# Patient Record
Sex: MALE | Race: WHITE | NOT HISPANIC OR LATINO | Employment: OTHER | URBAN - METROPOLITAN AREA
[De-identification: names, ages, dates, MRNs, and addresses within clinical notes are randomized per-mention and may not be internally consistent; named-entity substitution may affect disease eponyms.]

---

## 2020-03-15 ENCOUNTER — HOSPITAL ENCOUNTER (EMERGENCY)
Facility: HOSPITAL | Age: 70
Discharge: HOME | End: 2020-03-15
Attending: EMERGENCY MEDICINE
Payer: MEDICARE

## 2020-03-15 VITALS
TEMPERATURE: 98 F | RESPIRATION RATE: 14 BRPM | SYSTOLIC BLOOD PRESSURE: 161 MMHG | WEIGHT: 112 LBS | HEIGHT: 66 IN | HEART RATE: 88 BPM | OXYGEN SATURATION: 100 % | BODY MASS INDEX: 18 KG/M2 | DIASTOLIC BLOOD PRESSURE: 70 MMHG
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
## 2020-03-15 DIAGNOSIS — S00.83XA FACIAL CONTUSION, INITIAL ENCOUNTER: ICD-10-CM

## 2020-03-15 DIAGNOSIS — M79.642 LEFT HAND PAIN: Primary | ICD-10-CM

## 2020-03-15 DIAGNOSIS — M79.642 HAND PAIN, LEFT: ICD-10-CM

## 2020-03-15 PROCEDURE — 99285 PR EMERGENCY DEPT VISIT,LEVEL V: ICD-10-PCS | Mod: ,,, | Performed by: EMERGENCY MEDICINE

## 2020-03-15 PROCEDURE — 99285 EMERGENCY DEPT VISIT HI MDM: CPT | Mod: ,,, | Performed by: EMERGENCY MEDICINE

## 2020-03-15 PROCEDURE — 99285 EMERGENCY DEPT VISIT HI MDM: CPT | Mod: 25

## 2020-03-15 RX ORDER — PHENOBARBITAL 16.2 MG/1
32 TABLET ORAL DAILY
COMMUNITY

## 2020-03-15 RX ORDER — TIZANIDINE 4 MG/1
4 TABLET ORAL 2 TIMES DAILY
Status: ON HOLD | COMMUNITY
End: 2021-10-25 | Stop reason: HOSPADM

## 2020-03-15 RX ORDER — ZONISAMIDE 100 MG/1
CAPSULE ORAL
COMMUNITY

## 2020-03-15 RX ORDER — GABAPENTIN 100 MG/1
100 CAPSULE ORAL 2 TIMES DAILY
COMMUNITY

## 2020-03-15 RX ORDER — ACETAMINOPHEN 500 MG
500 TABLET ORAL EVERY 6 HOURS PRN
Qty: 30 TABLET | Refills: 0 | Status: SHIPPED | OUTPATIENT
Start: 2020-03-15

## 2020-03-15 NOTE — ED PROVIDER NOTES
Encounter Date: 3/15/2020    SCRIBE #1 NOTE: I, Ran Hernandez, am scribing for, and in the presence of,  Dr. Parson. I have scribed the entire note.       History     Chief Complaint   Patient presents with    Hand Injury     Pt states that he had a trip and fall on Friday, landing on his left hand.  Pt with pain and decreased ROM to hand.     Time patient was seen by the provider: 2:55 PM      The patient is a 69 y.o. male with co-morbidities including: Epilepsy, stroke, and Arthritis, who presents to the ED with a complaint of Hand injury. The patient states that he fell and caught himself with his left hand at about 3:30 am on Friday. He reports hitting a desk while trying to break his fall before landing on the wooden floor. He reports the pain is centralized in his whole left hand. Patient is right hand dominant. He states the pain has been severe and sharp in nature, prompting him to take his aspirin which did not alleviate his symptoms. He confirms that he caught the left side of his jaw on the desk on the way down but endorses minor pain. NKDA. He has no other acute symptoms to report at this time.      The history is provided by the patient.     Review of patient's allergies indicates:  No Known Allergies  Past Medical History:   Diagnosis Date    Arthritis     Epilepsy     Hyperglycemia     Stroke      Past Surgical History:   Procedure Laterality Date    CHOLECYSTECTOMY      JOINT REPLACEMENT      SKIN CANCER EXCISION       Family History   Problem Relation Age of Onset    Diabetes Mother      Social History     Tobacco Use    Smoking status: Current Every Day Smoker     Packs/day: 1.00     Years: 45.00     Pack years: 45.00     Types: Cigarettes    Smokeless tobacco: Never Used   Substance Use Topics    Alcohol use: No    Drug use: No     Review of Systems   CONST: No fever, chills, weight change, or fatigue.  HEENT: No headache, blurry vision/change in vision, sore throat, ear pain, eye pain,  otorrhea, rhinorrhea, tooth pain, swelling, or voice changes.  NECK: No pain, masses, trauma, or redness.  HEART: No pain, palpitations, or diaphoresis.  LUNG: No SOB, cough, orthopnea, GOMEZ or other complaints.  ABDOMEN: No pain, nausea, vomiting, diarrhea, constipation, or flank pain.  : No discharge, dysuria, lesions, rashes, masses, sores.  EXTREMITIES: FROM with No swelling, redness, injuries/trauma, lesions, sores, weakness, numbness, or tingling. Left hand pain secondary to fall.  NEURO: No dizziness, weakness, fatigue, tremors, headache, change in vision or disturbances of balance or coordination.  SKIN: No lesions, rashes, trauma or other complaints.      Physical Exam     Initial Vitals [03/15/20 1415]   BP Pulse Resp Temp SpO2   (!) 189/74 (!) 112 16 98.8 °F (37.1 °C) 98 %      MAP       --         Physical Exam    Nursing note and vitals reviewed.  Constitutional: He appears well-developed and well-nourished. He is not diaphoretic. No distress.   HENT:   Head: Normocephalic and atraumatic.   Eyes: EOM are normal. Pupils are equal, round, and reactive to light. No scleral icterus.   Neck: Normal range of motion. Neck supple.   Cardiovascular: Normal rate, regular rhythm, normal heart sounds and intact distal pulses. Exam reveals no gallop and no friction rub.    No murmur heard.  Pulmonary/Chest: Breath sounds normal.   Musculoskeletal:   Decreased ROM. TTP of the MCP region of the ring and little finger. Area of ecchymosis over base metacarpal and MCP of the 4th finger.    Neurological: He is alert and oriented to person, place, and time.   Skin: Skin is warm and dry. Capillary refill takes less than 2 seconds. No erythema. No pallor.   Psychiatric: He has a normal mood and affect.         ED Course   Procedures  Labs Reviewed - No data to display       Imaging Results          X-Ray Wrist Navicular Views Left (Final result)  Result time 03/15/20 17:19:37    Final result by Prabhu Garza MD (03/15/20  17:19:37)                 Impression:      Mild degenerative changes with no acute radiographic abnormality.      Electronically signed by: Prabhu Boyer  Date:    03/15/2020  Time:    17:19             Narrative:    EXAMINATION:  XR WRIST NAVICULAR VIEWS LEFT    CLINICAL HISTORY:  wrist pain;    TECHNIQUE:  Four views of the left wrist/navicular.  AP and oblique views.    COMPARISON:  03/15/2020 approximately 1-1/2 hours prior    FINDINGS:  No significant change in the short interval.    Mild degenerative changes of the radiocarpal joint and 1st carpometacarpal joint.    No acute fracture, subluxation or dislocation.  No lateral views.    No osseous destruction.  Scaphoid appears intact.                               CT Head Without Contrast (Final result)  Result time 03/15/20 16:35:36    Final result by Alva Pina MD (03/15/20 16:35:36)                 Impression:      Significant white matter loss involving the right parietal lobe and occipital lobe consistent with a prior area of infarction with compensatory enlargement of the right lateral ventricle.    No definite acute process seen.      Electronically signed by: Alva Pina MD  Date:    03/15/2020  Time:    16:35             Narrative:    EXAMINATION:  CT HEAD WITHOUT CONTRAST    CLINICAL HISTORY:  Maxface trauma blunt;    TECHNIQUE:  Low dose axial images were obtained through the head.  Coronal and sagittal reformations were also performed. Contrast was not administered.    COMPARISON:  08/25/2006    FINDINGS:  Significant white matter loss of the right occipital and parietal lobe with compensatory enlargement of the posterior horn and temporal horn of the right lateral ventricle likely the results of a prior infarction.  No definite area of hypoattenuation seen to strongly suggest an acute infarction.  There is no intracranial mass or hemorrhage seen.  No subdural fluid collection.  The skull appears intact.  The visualized paranasal  sinuses are clear.                               CT Maxillofacial Without Contrast (Final result)  Result time 03/15/20 17:01:42    Final result by Alva Pina MD (03/15/20 17:01:42)                 Impression:      No evidence of an acute facial fracture.    Mild displacement of the infraorbital wall that appears similar in configuration when compared to CT 08/25/2006 and consistent with a remote inferior orbital wall fracture.    Calcification of the bilateral carotid bulbs and ICAs.    Additional stable incidental findings, as above.    Electronically signed by resident: Pete Villanueva  Date:    03/15/2020  Time:    16:34    Electronically signed by: Alva Pina MD  Date:    03/15/2020  Time:    17:01             Narrative:    EXAMINATION:  CT MAXILLOFACIAL WITHOUT CONTRAST    CLINICAL HISTORY:  Maxface trauma blunt;TMJ pain or limited movement;    TECHNIQUE:  Low dose axial images, sagittal and coronal reformations were obtained through the face.  Contrast was not administered.    COMPARISON:  CT head 08/25/2006.    FINDINGS:  Facial bones: There is mild superior displacement of a portion of the right inferior orbital wall that appears relatively similar in configuration when compared to CT 08/25/2006, likely representing a remote orbital wall fracture.  Otherwise, there is no fracture involving the bones of the face.  Specifically the mandible is without fracture.  The bilateral temporomandibular joints are in appropriate position.    Subcutaneous soft tissues: No significant soft tissue thickening or edema.    Orbits: Within normal limits.    Paranasal sinuses: Minimal mucosal thickening in the posterior ethmoidal air cells.  Remaining paranasal sinuses are essentially clear.  Mild leftward septal deviation.  Mastoid air cells are clear.    Aerodigestive tract: Patient is edentulous.    Lymph nodes: No pathologic adenopathy.    Salivary glands: Normal.    Vascular structures: Calcification  involving the bilateral carotid bulbs, more prominent on the left.  Additional calcification involving the supraclinoid portions of the ICAs.    Skull base: Within normal limits.    Cervical spine (imaged portions): Moderate multilevel degenerative changes of the cervical spine.  No acute fracture or osseous destructive process.    Other findings: N/A                               CT Cervical Spine Without Contrast (Final result)  Result time 03/15/20 16:59:22    Final result by Alva Pina MD (03/15/20 16:59:22)                 Impression:      No acute fracture identified in the cervical spine.    Degenerative changes as above, most severe at the left C3-C4 region with severe left-sided neural foraminal narrowing.    Biapical centrilobular and paraseptal emphysema.    For further characterization of the head and maxillofacial region, please see dedicated studies of same date.    Electronically signed by resident: Dirk Pradhan  Date:    03/15/2020  Time:    16:31    Electronically signed by: Alva Pina MD  Date:    03/15/2020  Time:    16:59             Narrative:    EXAMINATION:  CT CERVICAL SPINE WITHOUT CONTRAST    CLINICAL HISTORY:  Neck pain, first study;    TECHNIQUE:  Low dose axial images, sagittal and coronal reformations were performed through the cervical spine.  Contrast was not administered.    COMPARISON:  CT head and maxillofacial of same date 03/15/2020    CT head 08/25/2006, 08/23/2006    MRI brain 08/22/2006    FINDINGS:  Skull base and craniocervical junction (partially imaged): No significant abnormality.    Spinal alignment: Normal cervical lordosis.  No spondylolisthesis.    Vertebrae: Anterior and posterior arches of C1 are normal. Ondontoid process is intact. Vertebral body heights are well maintained.  No evidence of fracture or dislocation.    Discs: Disc space height is well maintained.    Degenerative changes:    C2-C3:Left-sided facet arthropathy with mild neural  foraminal narrowing.  No significant spinal canal stenosis.    C3-C4:Severe left-sided facet arthropathy and uncovertebral spurring with resultant severe left-sided neural foraminal narrowing.  No significant spinal canal stenosis.    C4-C5:Mild right-sided facet arthropathy.  No significant neural foraminal narrowing or spinal canal stenosis.    C5-C6:Mild sided uncovertebral spurring with mild left-sided neural foraminal narrowing.  No significant spinal canal stenosis.    C6-C7:No significant neural foraminal narrowing or spinal canal stenosis.    C7-T1:No significant spinal stenosis or neural foraminal narrowing.    The soft tissue structures visualized in the neck are unremarkable.    The airway is patent and the lung apices demonstrate centrilobular and paraseptal emphysema, no focal opacities identified..  The visualized portions of the brain demonstrate no significant abnormality.                               X-Ray Hand 2 View Left (Final result)  Result time 03/15/20 15:37:17    Final result by Alva Pina MD (03/15/20 15:37:17)                 Impression:      As above      Electronically signed by: Alva Pina MD  Date:    03/15/2020  Time:    15:37             Narrative:    EXAMINATION:  XR HAND 2 VIEW LEFT    CLINICAL HISTORY:  Pain in left hand    TECHNIQUE:  AP, and lateral    COMPARISON:  None    FINDINGS:  The alignment is normal.  No definite fracture line identified.  Very subtle irregularity radial margin of the scaphoid not uncommonly seen without a definite fracture line identified, if symptoms referred to the scaphoid consider a scaphoid view.  No advanced degenerative change.  The soft tissues appear normal.                               X-Ray Wrist Complete Left (Final result)  Result time 03/15/20 15:37:08    Final result by Umu Oconnell MD (03/15/20 15:37:08)                 Impression:      As above.      Electronically signed by: Umu Oconnell  MD  Date:    03/15/2020  Time:    15:37             Narrative:    EXAMINATION:  XR WRIST COMPLETE 3 VIEWS LEFT    CLINICAL HISTORY:  Pain in left hand    TECHNIQUE:  PA, lateral, and oblique views of the left wrist were performed.    COMPARISON:  None    FINDINGS:  No acute fracture or bony destructive process is seen.  There are mild degenerative changes at the radiocarpal and 1st carpometacarpal articulations.  There is diffuse osseous demineralization.  Alignment is preserved.                               X-Ray Forearm Left (Final result)  Result time 03/15/20 15:31:30    Final result by Umu Oconnell MD (03/15/20 15:31:30)                 Impression:      As above.      Electronically signed by: Umu Oconnell MD  Date:    03/15/2020  Time:    15:31             Narrative:    EXAMINATION:  XR FOREARM LEFT    CLINICAL HISTORY:  Unspecified fall, initial encounter    TECHNIQUE:  AP and lateral views of the left forearm were performed.    COMPARISON:  None    FINDINGS:  No acute fracture or bony destructive process is seen.  Alignment of the forearm is preserved.  There are mild degenerative changes at the radiocarpal articulation and 1st carpometacarpal joint with joint space narrowing and some sclerosis of the opposing articular surfaces.  There is diffuse osseous demineralization.                               X-Ray Chest PA And Lateral (Final result)  Result time 03/15/20 15:28:39    Final result by Susan Root MD (03/15/20 15:28:39)                 Impression:      No acute process.      Electronically signed by: Susan Root MD  Date:    03/15/2020  Time:    15:28             Narrative:    EXAMINATION:  XR CHEST PA AND LATERAL    CLINICAL HISTORY:  Unspecified fall, initial encounter    TECHNIQUE:  PA and lateral views of the chest were performed.    COMPARISON:  11/03/2010    FINDINGS:  The lungs are hyperexpanded, but clear.  No consolidation or pleural effusions.  Heart size is normal.  The  bones are osteopenic and show age-appropriate degenerative change.  Calcified atheromatous disease affects the aorta.                                 Medical Decision Making:   History:   Old Medical Records: I decided to obtain old medical records.  Initial Assessment:   Afebrile, afebrile and neurovascularly intact right hand dominant male presents status post mechanical trip and fall with left hand pain and left jaw pain. No palpable deformities to left jaw or appreciated airway or respiratory instability.  Tenderness to palpation of left hand over 4th and 5th MCP joint.  No snuffbox tenderness to palpation initial evaluation.  ____________________  Konrad Parson MD, [facility]  Emergency Medicine Staff  3:02 PM 3/15/2020    ED STAFF ATTENDING PHYSICIAN HAND-OFF NOTE:  Zelalem Gamez is a 69 y.o. male who presented during my shift C/O left hand pain with no numbness.  My ED shift ends 4:43 PM 3/15/2020.  During my care, Zelalem Gamez has received:  Dr. Whiting will continue the management of Zelalem Gamez, reassess symptoms, follow up pending LEFT hand navicular views, CTs of heaD, mAX AND c-SPINE, and final disposition.  I anticipate patient will be discharged home +/- thumb spica to left upper extremity.  ______________________  Konrad Parson MD, [facility]  Emergency Medicine Staff  4:43 PM 3/15/2020    Independently Interpreted Test(s):   I have ordered and independently interpreted X-rays - see prior notes.  Clinical Tests:   Radiological Study: Ordered and Reviewed  ED Management:  Right hand dominant male s/p mechanical fall with signs and symptoms of contusion vs fracture to the left hand metacarpal vs 4th/5th phalanx. No neurovascular deficits. No snuffbox TTP. He additionally has left jaw pain and reports contusion to maxillofacial region 2 days ago with fall. Will obtain x-rays. Minimal suspicion for mandibular fracture. Minimal suspicion of intracranial hemmorhage given patient's lack of neurological  deficits. No loss of conciousness.            Scribe Attestation:   Scribe #1: I performed the above scribed service and the documentation accurately describes the services I performed. I attest to the accuracy of the note.                          Clinical Impression:       ICD-10-CM ICD-9-CM   1. Left hand pain M79.642 729.5   2. Fall W19.XXXA E888.9   3. Hand pain, left M79.642 729.5   4. Facial contusion, initial encounter S00.83XA 920         Disposition:   Disposition: Discharged  Condition: Stable     ED Disposition Condition    Discharge Stable        ED Prescriptions     Medication Sig Dispense Start Date End Date Auth. Provider    acetaminophen (TYLENOL) 500 MG tablet Take 1 tablet (500 mg total) by mouth every 6 (six) hours as needed for Pain. 30 tablet 3/15/2020  Austin Whiting Jr., MD        Follow-up Information     Follow up With Specialties Details Why Contact Info    Deysi Pink MD Family Medicine In 1 week  [address]  [address]  [place] [address]  [phone]      [facility] Emergency Medicine  If symptoms worsen [address] [facility] [address] [facility] [address] [phone]                           John Parson MD  03/16/20 7825

## 2020-03-15 NOTE — PROVIDER PROGRESS NOTES - EMERGENCY DEPT.
Encounter Date: 3/15/2020    ED Physician Progress Notes        Physician Note:   69 year old gentleman here after a recent fall and hand injury. Patient was pending imaging after I obtained a sign out from Dr. Parson. Imaging negative for acute findings. Will discharge home with follow up instructions and return precautions.

## 2020-03-15 NOTE — ED TRIAGE NOTES
Pt presents s/p fall Friday. He landed on his left hand. States that it has increasingly became more painful and he is unable to move it now. Pt states that he had twisted his ankle but it is feeling better now. Pt not taking thinners

## 2020-03-15 NOTE — ED NOTES
Patient identifiers checked and correct.  LOC: The patient is awake, alert and aware of environment with an appropriate affect, the patient is oriented x 4 and speaking appropriately.  APPEARANCE: Patient resting comfortably and in no acute distress, patient is clean and well groomed, patient's clothing is properly fastened.  SKIN: The skin is warm and dry, color consistent with ethnicity, patient has normal skin turgor and moist mucus membranes, skin intact, no breakdown or bruising noted.  MUSCULOSKELETAL: Swelling noted to left hand and wrist. Bruising noted on hand. Pt has limited movement of the left hand and wrist  RESPIRATORY: Airway is open and patent, respirations are spontaneous and even, patient has a normal effort and rate.  CARDIAC: Patient has a normal rate and rhythm, no periphreal edema noted, capillary refill < 3 seconds. Normal +2 pedal pulses present.  ABDOMEN: Soft and non tender to palpation, no distention noted.  NEUROLOGIC: Eyes open spontaneously, PERRL, behavior appropriate to situation, follows commands, facial expression symmetrical, bilateral hand grasp equal and even, purposeful motor response noted, normal sensation in all extremities.

## 2020-03-15 NOTE — ED NOTES
Pt is pacing around with an unsteady gait around CCR after multiple attempts educating on fall risks but pt is not compliant.MD notified

## 2020-03-16 ENCOUNTER — PES CALL (OUTPATIENT)
Dept: ADMINISTRATIVE | Facility: CLINIC | Age: 70
End: 2020-03-16

## 2021-03-22 ENCOUNTER — TELEPHONE (OUTPATIENT)
Dept: PODIATRY | Facility: CLINIC | Age: 71
End: 2021-03-22

## 2021-10-08 ENCOUNTER — HOSPITAL ENCOUNTER (INPATIENT)
Facility: HOSPITAL | Age: 71
LOS: 4 days | Discharge: NURSING, REHAB OR LONG-TERM CARE | DRG: 536 | End: 2021-10-12
Attending: EMERGENCY MEDICINE | Admitting: INTERNAL MEDICINE
Payer: MEDICARE

## 2021-10-08 DIAGNOSIS — M97.02XD PERIPROSTHETIC FRACTURE AROUND INTERNAL PROSTHETIC LEFT HIP JOINT, SUBSEQUENT ENCOUNTER: ICD-10-CM

## 2021-10-08 DIAGNOSIS — Z96.649 PERIPROSTHETIC FRACTURE AROUND INTERNAL PROSTHETIC HIP JOINT: ICD-10-CM

## 2021-10-08 DIAGNOSIS — G40.909 SEIZURE DISORDER: ICD-10-CM

## 2021-10-08 DIAGNOSIS — M97.8XXA PERIPROSTHETIC FRACTURE AROUND INTERNAL PROSTHETIC HIP JOINT: ICD-10-CM

## 2021-10-08 DIAGNOSIS — R63.6 UNDERWEIGHT: ICD-10-CM

## 2021-10-08 DIAGNOSIS — M25.559 HIP PAIN: ICD-10-CM

## 2021-10-08 DIAGNOSIS — M97.02XA PERIPROSTHETIC FRACTURE AROUND INTERNAL PROSTHETIC LEFT HIP JOINT, INITIAL ENCOUNTER: Primary | ICD-10-CM

## 2021-10-08 DIAGNOSIS — N18.32 STAGE 3B CHRONIC KIDNEY DISEASE: ICD-10-CM

## 2021-10-08 LAB
ALBUMIN SERPL BCP-MCNC: 3.7 G/DL (ref 3.5–5.2)
ALP SERPL-CCNC: 41 U/L (ref 55–135)
ALT SERPL W/O P-5'-P-CCNC: 7 U/L (ref 10–44)
ANION GAP SERPL CALC-SCNC: 13 MMOL/L (ref 8–16)
AST SERPL-CCNC: 20 U/L (ref 10–40)
BASOPHILS # BLD AUTO: 0.05 K/UL (ref 0–0.2)
BASOPHILS NFR BLD: 0.6 % (ref 0–1.9)
BILIRUB SERPL-MCNC: 0.4 MG/DL (ref 0.1–1)
BUN SERPL-MCNC: 12 MG/DL (ref 8–23)
CALCIUM SERPL-MCNC: 9.1 MG/DL (ref 8.7–10.5)
CHLORIDE SERPL-SCNC: 103 MMOL/L (ref 95–110)
CO2 SERPL-SCNC: 20 MMOL/L (ref 23–29)
CREAT SERPL-MCNC: 1.9 MG/DL (ref 0.5–1.4)
CTP QC/QA: YES
DIFFERENTIAL METHOD: ABNORMAL
EOSINOPHIL # BLD AUTO: 0.1 K/UL (ref 0–0.5)
EOSINOPHIL NFR BLD: 1.5 % (ref 0–8)
ERYTHROCYTE [DISTWIDTH] IN BLOOD BY AUTOMATED COUNT: 13.4 % (ref 11.5–14.5)
EST. GFR  (AFRICAN AMERICAN): 39.8 ML/MIN/1.73 M^2
EST. GFR  (NON AFRICAN AMERICAN): 34.5 ML/MIN/1.73 M^2
GLUCOSE SERPL-MCNC: 91 MG/DL (ref 70–110)
HCT VFR BLD AUTO: 29.2 % (ref 40–54)
HGB BLD-MCNC: 9.9 G/DL (ref 14–18)
IMM GRANULOCYTES # BLD AUTO: 0.03 K/UL (ref 0–0.04)
IMM GRANULOCYTES NFR BLD AUTO: 0.3 % (ref 0–0.5)
LYMPHOCYTES # BLD AUTO: 1.2 K/UL (ref 1–4.8)
LYMPHOCYTES NFR BLD: 13.6 % (ref 18–48)
MCH RBC QN AUTO: 33.7 PG (ref 27–31)
MCHC RBC AUTO-ENTMCNC: 33.9 G/DL (ref 32–36)
MCV RBC AUTO: 99 FL (ref 82–98)
MONOCYTES # BLD AUTO: 0.7 K/UL (ref 0.3–1)
MONOCYTES NFR BLD: 7.6 % (ref 4–15)
NEUTROPHILS # BLD AUTO: 6.9 K/UL (ref 1.8–7.7)
NEUTROPHILS NFR BLD: 76.4 % (ref 38–73)
NRBC BLD-RTO: 0 /100 WBC
PLATELET # BLD AUTO: 165 K/UL (ref 150–450)
PMV BLD AUTO: 12.7 FL (ref 9.2–12.9)
POTASSIUM SERPL-SCNC: 4.4 MMOL/L (ref 3.5–5.1)
PROT SERPL-MCNC: 7.4 G/DL (ref 6–8.4)
RBC # BLD AUTO: 2.94 M/UL (ref 4.6–6.2)
SARS-COV-2 RDRP RESP QL NAA+PROBE: NEGATIVE
SODIUM SERPL-SCNC: 136 MMOL/L (ref 136–145)
WBC # BLD AUTO: 8.96 K/UL (ref 3.9–12.7)

## 2021-10-08 PROCEDURE — 99285 PR EMERGENCY DEPT VISIT,LEVEL V: ICD-10-PCS | Mod: CS,,, | Performed by: EMERGENCY MEDICINE

## 2021-10-08 PROCEDURE — 12000002 HC ACUTE/MED SURGE SEMI-PRIVATE ROOM

## 2021-10-08 PROCEDURE — 80053 COMPREHEN METABOLIC PANEL: CPT | Performed by: EMERGENCY MEDICINE

## 2021-10-08 PROCEDURE — 96374 THER/PROPH/DIAG INJ IV PUSH: CPT

## 2021-10-08 PROCEDURE — U0002 COVID-19 LAB TEST NON-CDC: HCPCS | Performed by: EMERGENCY MEDICINE

## 2021-10-08 PROCEDURE — 99285 EMERGENCY DEPT VISIT HI MDM: CPT | Mod: 25

## 2021-10-08 PROCEDURE — 93005 ELECTROCARDIOGRAM TRACING: CPT

## 2021-10-08 PROCEDURE — 85025 COMPLETE CBC W/AUTO DIFF WBC: CPT | Performed by: EMERGENCY MEDICINE

## 2021-10-08 PROCEDURE — 51702 INSERT TEMP BLADDER CATH: CPT

## 2021-10-08 PROCEDURE — 93010 ELECTROCARDIOGRAM REPORT: CPT | Mod: ,,, | Performed by: INTERNAL MEDICINE

## 2021-10-08 PROCEDURE — 99285 EMERGENCY DEPT VISIT HI MDM: CPT | Mod: CS,,, | Performed by: EMERGENCY MEDICINE

## 2021-10-08 PROCEDURE — 63600175 PHARM REV CODE 636 W HCPCS: Performed by: EMERGENCY MEDICINE

## 2021-10-08 PROCEDURE — 93010 EKG 12-LEAD: ICD-10-PCS | Mod: ,,, | Performed by: INTERNAL MEDICINE

## 2021-10-08 PROCEDURE — 86803 HEPATITIS C AB TEST: CPT | Performed by: EMERGENCY MEDICINE

## 2021-10-08 RX ORDER — MORPHINE SULFATE 2 MG/ML
0.1 INJECTION, SOLUTION INTRAMUSCULAR; INTRAVENOUS
Status: COMPLETED | OUTPATIENT
Start: 2021-10-08 | End: 2021-10-08

## 2021-10-08 RX ADMIN — MORPHINE SULFATE 5.44 MG: 2 INJECTION, SOLUTION INTRAMUSCULAR; INTRAVENOUS at 07:10

## 2021-10-09 PROBLEM — G40.909 SEIZURE DISORDER: Status: ACTIVE | Noted: 2021-10-09

## 2021-10-09 PROBLEM — N18.32 STAGE 3B CHRONIC KIDNEY DISEASE: Status: ACTIVE | Noted: 2021-10-09

## 2021-10-09 PROBLEM — Z01.810 PREOPERATIVE CARDIOVASCULAR EXAMINATION: Status: ACTIVE | Noted: 2021-10-09

## 2021-10-09 PROBLEM — Z01.810 PREOPERATIVE CARDIOVASCULAR EXAMINATION: Status: RESOLVED | Noted: 2021-10-09 | Resolved: 2021-10-09

## 2021-10-09 LAB
INR PPP: 1.1 (ref 0.8–1.2)
PROTHROMBIN TIME: 11.6 SEC (ref 9–12.5)

## 2021-10-09 PROCEDURE — 63600175 PHARM REV CODE 636 W HCPCS: Performed by: HOSPITALIST

## 2021-10-09 PROCEDURE — 99223 PR INITIAL HOSPITAL CARE,LEVL III: ICD-10-PCS | Mod: ,,, | Performed by: HOSPITALIST

## 2021-10-09 PROCEDURE — 25000003 PHARM REV CODE 250: Performed by: STUDENT IN AN ORGANIZED HEALTH CARE EDUCATION/TRAINING PROGRAM

## 2021-10-09 PROCEDURE — 25000003 PHARM REV CODE 250: Performed by: HOSPITALIST

## 2021-10-09 PROCEDURE — 25000003 PHARM REV CODE 250: Performed by: INTERNAL MEDICINE

## 2021-10-09 PROCEDURE — 63600175 PHARM REV CODE 636 W HCPCS: Performed by: STUDENT IN AN ORGANIZED HEALTH CARE EDUCATION/TRAINING PROGRAM

## 2021-10-09 PROCEDURE — 85610 PROTHROMBIN TIME: CPT | Performed by: STUDENT IN AN ORGANIZED HEALTH CARE EDUCATION/TRAINING PROGRAM

## 2021-10-09 PROCEDURE — 63600175 PHARM REV CODE 636 W HCPCS: Performed by: PHYSICIAN ASSISTANT

## 2021-10-09 PROCEDURE — 11000001 HC ACUTE MED/SURG PRIVATE ROOM

## 2021-10-09 PROCEDURE — 99223 1ST HOSP IP/OBS HIGH 75: CPT | Mod: ,,, | Performed by: HOSPITALIST

## 2021-10-09 PROCEDURE — 36415 COLL VENOUS BLD VENIPUNCTURE: CPT | Performed by: STUDENT IN AN ORGANIZED HEALTH CARE EDUCATION/TRAINING PROGRAM

## 2021-10-09 RX ORDER — PHENOBARBITAL 32.4 MG/1
32 TABLET ORAL DAILY
Status: DISCONTINUED | OUTPATIENT
Start: 2021-10-09 | End: 2021-10-12 | Stop reason: HOSPADM

## 2021-10-09 RX ORDER — FENTANYL CITRATE 50 UG/ML
25 INJECTION, SOLUTION INTRAMUSCULAR; INTRAVENOUS EVERY 5 MIN PRN
Status: CANCELLED | OUTPATIENT
Start: 2021-10-09

## 2021-10-09 RX ORDER — HYDRALAZINE HYDROCHLORIDE 50 MG/1
50 TABLET, FILM COATED ORAL EVERY 6 HOURS PRN
Status: DISCONTINUED | OUTPATIENT
Start: 2021-10-09 | End: 2021-10-12 | Stop reason: HOSPADM

## 2021-10-09 RX ORDER — CHOLECALCIFEROL (VITAMIN D3) 25 MCG
2000 TABLET ORAL DAILY
Status: DISCONTINUED | OUTPATIENT
Start: 2021-10-09 | End: 2021-10-12 | Stop reason: HOSPADM

## 2021-10-09 RX ORDER — MUPIROCIN 20 MG/G
1 OINTMENT TOPICAL
Status: CANCELLED | OUTPATIENT
Start: 2021-10-09

## 2021-10-09 RX ORDER — CEFAZOLIN SODIUM 1 G/3ML
2 INJECTION, POWDER, FOR SOLUTION INTRAMUSCULAR; INTRAVENOUS
Status: CANCELLED | OUTPATIENT
Start: 2021-10-09 | End: 2021-10-09

## 2021-10-09 RX ORDER — ACETAMINOPHEN 500 MG
1000 TABLET ORAL EVERY 8 HOURS
Status: DISPENSED | OUTPATIENT
Start: 2021-10-09 | End: 2021-10-10

## 2021-10-09 RX ORDER — OXYCODONE HYDROCHLORIDE 10 MG/1
10 TABLET ORAL
Status: DISCONTINUED | OUTPATIENT
Start: 2021-10-09 | End: 2021-10-12 | Stop reason: HOSPADM

## 2021-10-09 RX ORDER — MUPIROCIN 20 MG/G
OINTMENT TOPICAL 2 TIMES DAILY
Status: DISCONTINUED | OUTPATIENT
Start: 2021-10-09 | End: 2021-10-12 | Stop reason: HOSPADM

## 2021-10-09 RX ORDER — LIDOCAINE HYDROCHLORIDE 10 MG/ML
1 INJECTION, SOLUTION EPIDURAL; INFILTRATION; INTRACAUDAL; PERINEURAL
Status: CANCELLED | OUTPATIENT
Start: 2021-10-09

## 2021-10-09 RX ORDER — OXYCODONE HYDROCHLORIDE 5 MG/1
5 TABLET ORAL
Status: DISCONTINUED | OUTPATIENT
Start: 2021-10-09 | End: 2021-10-12 | Stop reason: HOSPADM

## 2021-10-09 RX ORDER — MIDAZOLAM HYDROCHLORIDE 1 MG/ML
0.5 INJECTION INTRAMUSCULAR; INTRAVENOUS
Status: CANCELLED | OUTPATIENT
Start: 2021-10-09

## 2021-10-09 RX ORDER — SODIUM CHLORIDE 0.9 % (FLUSH) 0.9 %
10 SYRINGE (ML) INJECTION
Status: DISCONTINUED | OUTPATIENT
Start: 2021-10-09 | End: 2021-10-12 | Stop reason: HOSPADM

## 2021-10-09 RX ORDER — ONDANSETRON 2 MG/ML
4 INJECTION INTRAMUSCULAR; INTRAVENOUS EVERY 12 HOURS PRN
Status: DISCONTINUED | OUTPATIENT
Start: 2021-10-09 | End: 2021-10-12 | Stop reason: HOSPADM

## 2021-10-09 RX ORDER — MUPIROCIN 20 MG/G
1 OINTMENT TOPICAL 2 TIMES DAILY
Status: CANCELLED | OUTPATIENT
Start: 2021-10-09 | End: 2021-10-14

## 2021-10-09 RX ORDER — CEFAZOLIN SODIUM 1 G/3ML
2 INJECTION, POWDER, FOR SOLUTION INTRAMUSCULAR; INTRAVENOUS
Status: CANCELLED | OUTPATIENT
Start: 2021-10-09

## 2021-10-09 RX ORDER — HYDRALAZINE HYDROCHLORIDE 20 MG/ML
10 INJECTION INTRAMUSCULAR; INTRAVENOUS ONCE
Status: COMPLETED | OUTPATIENT
Start: 2021-10-09 | End: 2021-10-09

## 2021-10-09 RX ORDER — POLYETHYLENE GLYCOL 3350 17 G/17G
17 POWDER, FOR SOLUTION ORAL DAILY
Status: DISCONTINUED | OUTPATIENT
Start: 2021-10-09 | End: 2021-10-12 | Stop reason: HOSPADM

## 2021-10-09 RX ORDER — GABAPENTIN 100 MG/1
100 CAPSULE ORAL 2 TIMES DAILY
Status: DISCONTINUED | OUTPATIENT
Start: 2021-10-09 | End: 2021-10-12 | Stop reason: HOSPADM

## 2021-10-09 RX ORDER — AMOXICILLIN 250 MG
1 CAPSULE ORAL 2 TIMES DAILY
Status: CANCELLED | OUTPATIENT
Start: 2021-10-09

## 2021-10-09 RX ORDER — ENOXAPARIN SODIUM 100 MG/ML
40 INJECTION SUBCUTANEOUS EVERY 24 HOURS
Status: DISCONTINUED | OUTPATIENT
Start: 2021-10-09 | End: 2021-10-12 | Stop reason: HOSPADM

## 2021-10-09 RX ORDER — MORPHINE SULFATE 2 MG/ML
2 INJECTION, SOLUTION INTRAMUSCULAR; INTRAVENOUS
Status: DISCONTINUED | OUTPATIENT
Start: 2021-10-09 | End: 2021-10-10

## 2021-10-09 RX ORDER — SODIUM CHLORIDE 9 MG/ML
INJECTION, SOLUTION INTRAVENOUS CONTINUOUS
Status: DISCONTINUED | OUTPATIENT
Start: 2021-10-09 | End: 2021-10-09

## 2021-10-09 RX ORDER — ROPIVACAINE HYDROCHLORIDE 2 MG/ML
10 INJECTION, SOLUTION EPIDURAL; INFILTRATION; PERINEURAL CONTINUOUS
Status: CANCELLED | OUTPATIENT
Start: 2021-10-09

## 2021-10-09 RX ORDER — BISACODYL 10 MG
10 SUPPOSITORY, RECTAL RECTAL DAILY PRN
Status: DISCONTINUED | OUTPATIENT
Start: 2021-10-09 | End: 2021-10-12 | Stop reason: HOSPADM

## 2021-10-09 RX ORDER — ZONISAMIDE 100 MG/1
100 CAPSULE ORAL 2 TIMES DAILY
Status: DISCONTINUED | OUTPATIENT
Start: 2021-10-09 | End: 2021-10-12 | Stop reason: HOSPADM

## 2021-10-09 RX ORDER — MORPHINE SULFATE 2 MG/ML
2 INJECTION, SOLUTION INTRAMUSCULAR; INTRAVENOUS
Status: DISCONTINUED | OUTPATIENT
Start: 2021-10-09 | End: 2021-10-12 | Stop reason: HOSPADM

## 2021-10-09 RX ORDER — TALC
6 POWDER (GRAM) TOPICAL NIGHTLY PRN
Status: DISCONTINUED | OUTPATIENT
Start: 2021-10-09 | End: 2021-10-12 | Stop reason: HOSPADM

## 2021-10-09 RX ORDER — METHOCARBAMOL 500 MG/1
500 TABLET, FILM COATED ORAL EVERY 6 HOURS PRN
Status: DISCONTINUED | OUTPATIENT
Start: 2021-10-09 | End: 2021-10-10

## 2021-10-09 RX ADMIN — MUPIROCIN: 20 OINTMENT TOPICAL at 09:10

## 2021-10-09 RX ADMIN — MORPHINE SULFATE 2 MG: 2 INJECTION, SOLUTION INTRAMUSCULAR; INTRAVENOUS at 01:10

## 2021-10-09 RX ADMIN — HYDRALAZINE HYDROCHLORIDE 10 MG: 20 INJECTION, SOLUTION INTRAMUSCULAR; INTRAVENOUS at 05:10

## 2021-10-09 RX ADMIN — GABAPENTIN 100 MG: 100 CAPSULE ORAL at 09:10

## 2021-10-09 RX ADMIN — ENOXAPARIN SODIUM 40 MG: 40 INJECTION, SOLUTION INTRAVENOUS; SUBCUTANEOUS at 05:10

## 2021-10-09 RX ADMIN — METHOCARBAMOL 500 MG: 500 TABLET ORAL at 09:10

## 2021-10-09 RX ADMIN — SODIUM CHLORIDE 1000 ML: 0.9 INJECTION, SOLUTION INTRAVENOUS at 12:10

## 2021-10-09 RX ADMIN — POLYETHYLENE GLYCOL 3350 17 G: 17 POWDER, FOR SOLUTION ORAL at 09:10

## 2021-10-09 RX ADMIN — MORPHINE SULFATE 2 MG: 2 INJECTION, SOLUTION INTRAMUSCULAR; INTRAVENOUS at 08:10

## 2021-10-09 RX ADMIN — ACETAMINOPHEN 1000 MG: 500 TABLET ORAL at 03:10

## 2021-10-09 RX ADMIN — GABAPENTIN 100 MG: 100 CAPSULE ORAL at 08:10

## 2021-10-09 RX ADMIN — PHENOBARBITAL 32.4 MG: 32.4 TABLET ORAL at 11:10

## 2021-10-09 RX ADMIN — SODIUM CHLORIDE: 0.9 INJECTION, SOLUTION INTRAVENOUS at 01:10

## 2021-10-09 RX ADMIN — Medication 2000 UNITS: at 09:10

## 2021-10-09 RX ADMIN — METHOCARBAMOL 500 MG: 500 TABLET ORAL at 08:10

## 2021-10-09 RX ADMIN — MUPIROCIN: 20 OINTMENT TOPICAL at 08:10

## 2021-10-09 RX ADMIN — ZONISAMIDE 100 MG: 100 CAPSULE ORAL at 08:10

## 2021-10-09 RX ADMIN — MORPHINE SULFATE 2 MG: 2 INJECTION, SOLUTION INTRAMUSCULAR; INTRAVENOUS at 06:10

## 2021-10-09 RX ADMIN — Medication 6 MG: at 08:10

## 2021-10-09 RX ADMIN — ZONISAMIDE 100 MG: 100 CAPSULE ORAL at 03:10

## 2021-10-10 PROBLEM — R63.6 UNDERWEIGHT: Status: ACTIVE | Noted: 2021-10-10

## 2021-10-10 LAB
ANION GAP SERPL CALC-SCNC: 8 MMOL/L (ref 8–16)
BASOPHILS # BLD AUTO: 0.03 K/UL (ref 0–0.2)
BASOPHILS NFR BLD: 0.5 % (ref 0–1.9)
BUN SERPL-MCNC: 16 MG/DL (ref 8–23)
CALCIUM SERPL-MCNC: 8.4 MG/DL (ref 8.7–10.5)
CHLORIDE SERPL-SCNC: 106 MMOL/L (ref 95–110)
CO2 SERPL-SCNC: 21 MMOL/L (ref 23–29)
CREAT SERPL-MCNC: 1.6 MG/DL (ref 0.5–1.4)
DIFFERENTIAL METHOD: ABNORMAL
EOSINOPHIL # BLD AUTO: 0.3 K/UL (ref 0–0.5)
EOSINOPHIL NFR BLD: 4.5 % (ref 0–8)
ERYTHROCYTE [DISTWIDTH] IN BLOOD BY AUTOMATED COUNT: 13.5 % (ref 11.5–14.5)
EST. GFR  (AFRICAN AMERICAN): 49 ML/MIN/1.73 M^2
EST. GFR  (NON AFRICAN AMERICAN): 42.4 ML/MIN/1.73 M^2
GLUCOSE SERPL-MCNC: 87 MG/DL (ref 70–110)
HCT VFR BLD AUTO: 27 % (ref 40–54)
HGB BLD-MCNC: 8.8 G/DL (ref 14–18)
IMM GRANULOCYTES # BLD AUTO: 0.01 K/UL (ref 0–0.04)
IMM GRANULOCYTES NFR BLD AUTO: 0.2 % (ref 0–0.5)
LYMPHOCYTES # BLD AUTO: 1 K/UL (ref 1–4.8)
LYMPHOCYTES NFR BLD: 16.2 % (ref 18–48)
MAGNESIUM SERPL-MCNC: 1.8 MG/DL (ref 1.6–2.6)
MCH RBC QN AUTO: 33.1 PG (ref 27–31)
MCHC RBC AUTO-ENTMCNC: 32.6 G/DL (ref 32–36)
MCV RBC AUTO: 102 FL (ref 82–98)
MONOCYTES # BLD AUTO: 0.6 K/UL (ref 0.3–1)
MONOCYTES NFR BLD: 8.7 % (ref 4–15)
NEUTROPHILS # BLD AUTO: 4.5 K/UL (ref 1.8–7.7)
NEUTROPHILS NFR BLD: 69.9 % (ref 38–73)
NRBC BLD-RTO: 0 /100 WBC
PHOSPHATE SERPL-MCNC: 3.5 MG/DL (ref 2.7–4.5)
PLATELET # BLD AUTO: 134 K/UL (ref 150–450)
PMV BLD AUTO: 11.9 FL (ref 9.2–12.9)
POTASSIUM SERPL-SCNC: 4.4 MMOL/L (ref 3.5–5.1)
RBC # BLD AUTO: 2.66 M/UL (ref 4.6–6.2)
SODIUM SERPL-SCNC: 135 MMOL/L (ref 136–145)
WBC # BLD AUTO: 6.41 K/UL (ref 3.9–12.7)

## 2021-10-10 PROCEDURE — 25000003 PHARM REV CODE 250: Performed by: HOSPITALIST

## 2021-10-10 PROCEDURE — 97530 THERAPEUTIC ACTIVITIES: CPT

## 2021-10-10 PROCEDURE — 84100 ASSAY OF PHOSPHORUS: CPT | Performed by: HOSPITALIST

## 2021-10-10 PROCEDURE — 36415 COLL VENOUS BLD VENIPUNCTURE: CPT | Performed by: HOSPITALIST

## 2021-10-10 PROCEDURE — 11000001 HC ACUTE MED/SURG PRIVATE ROOM

## 2021-10-10 PROCEDURE — 97161 PT EVAL LOW COMPLEX 20 MIN: CPT

## 2021-10-10 PROCEDURE — 97116 GAIT TRAINING THERAPY: CPT

## 2021-10-10 PROCEDURE — 25000003 PHARM REV CODE 250: Performed by: INTERNAL MEDICINE

## 2021-10-10 PROCEDURE — 80048 BASIC METABOLIC PNL TOTAL CA: CPT | Performed by: HOSPITALIST

## 2021-10-10 PROCEDURE — 85025 COMPLETE CBC W/AUTO DIFF WBC: CPT | Performed by: HOSPITALIST

## 2021-10-10 PROCEDURE — 99232 SBSQ HOSP IP/OBS MODERATE 35: CPT | Mod: ,,, | Performed by: INTERNAL MEDICINE

## 2021-10-10 PROCEDURE — 63600175 PHARM REV CODE 636 W HCPCS: Performed by: HOSPITALIST

## 2021-10-10 PROCEDURE — 97535 SELF CARE MNGMENT TRAINING: CPT

## 2021-10-10 PROCEDURE — 83735 ASSAY OF MAGNESIUM: CPT | Performed by: HOSPITALIST

## 2021-10-10 PROCEDURE — 97165 OT EVAL LOW COMPLEX 30 MIN: CPT

## 2021-10-10 PROCEDURE — 99232 PR SUBSEQUENT HOSPITAL CARE,LEVL II: ICD-10-PCS | Mod: ,,, | Performed by: INTERNAL MEDICINE

## 2021-10-10 RX ORDER — METHOCARBAMOL 500 MG/1
500 TABLET, FILM COATED ORAL 4 TIMES DAILY
Status: DISCONTINUED | OUTPATIENT
Start: 2021-10-10 | End: 2021-10-11

## 2021-10-10 RX ORDER — ACETAMINOPHEN 500 MG
1000 TABLET ORAL EVERY 8 HOURS
Status: DISCONTINUED | OUTPATIENT
Start: 2021-10-10 | End: 2021-10-12 | Stop reason: HOSPADM

## 2021-10-10 RX ADMIN — Medication 2000 UNITS: at 09:10

## 2021-10-10 RX ADMIN — PHENOBARBITAL 32.4 MG: 32.4 TABLET ORAL at 09:10

## 2021-10-10 RX ADMIN — MORPHINE SULFATE 2 MG: 2 INJECTION, SOLUTION INTRAMUSCULAR; INTRAVENOUS at 07:10

## 2021-10-10 RX ADMIN — METHOCARBAMOL 500 MG: 500 TABLET ORAL at 08:10

## 2021-10-10 RX ADMIN — OXYCODONE 10 MG: 10 TABLET ORAL at 04:10

## 2021-10-10 RX ADMIN — ACETAMINOPHEN 1000 MG: 500 TABLET ORAL at 11:10

## 2021-10-10 RX ADMIN — OXYCODONE 10 MG: 10 TABLET ORAL at 11:10

## 2021-10-10 RX ADMIN — METHOCARBAMOL 500 MG: 500 TABLET ORAL at 05:10

## 2021-10-10 RX ADMIN — MUPIROCIN: 20 OINTMENT TOPICAL at 08:10

## 2021-10-10 RX ADMIN — OXYCODONE 10 MG: 10 TABLET ORAL at 05:10

## 2021-10-10 RX ADMIN — GABAPENTIN 100 MG: 100 CAPSULE ORAL at 09:10

## 2021-10-10 RX ADMIN — MUPIROCIN: 20 OINTMENT TOPICAL at 09:10

## 2021-10-10 RX ADMIN — METHOCARBAMOL 500 MG: 500 TABLET ORAL at 02:10

## 2021-10-10 RX ADMIN — GABAPENTIN 100 MG: 100 CAPSULE ORAL at 08:10

## 2021-10-10 RX ADMIN — ZONISAMIDE 100 MG: 100 CAPSULE ORAL at 08:10

## 2021-10-10 RX ADMIN — ZONISAMIDE 100 MG: 100 CAPSULE ORAL at 09:10

## 2021-10-11 LAB
ANION GAP SERPL CALC-SCNC: 11 MMOL/L (ref 8–16)
BASOPHILS # BLD AUTO: 0.03 K/UL (ref 0–0.2)
BASOPHILS NFR BLD: 0.5 % (ref 0–1.9)
BUN SERPL-MCNC: 18 MG/DL (ref 8–23)
CALCIUM SERPL-MCNC: 8.5 MG/DL (ref 8.7–10.5)
CHLORIDE SERPL-SCNC: 104 MMOL/L (ref 95–110)
CO2 SERPL-SCNC: 19 MMOL/L (ref 23–29)
CREAT SERPL-MCNC: 1.6 MG/DL (ref 0.5–1.4)
DIFFERENTIAL METHOD: ABNORMAL
EOSINOPHIL # BLD AUTO: 0.3 K/UL (ref 0–0.5)
EOSINOPHIL NFR BLD: 4.1 % (ref 0–8)
ERYTHROCYTE [DISTWIDTH] IN BLOOD BY AUTOMATED COUNT: 13.2 % (ref 11.5–14.5)
EST. GFR  (AFRICAN AMERICAN): 49 ML/MIN/1.73 M^2
EST. GFR  (NON AFRICAN AMERICAN): 42.4 ML/MIN/1.73 M^2
GLUCOSE SERPL-MCNC: 101 MG/DL (ref 70–110)
HCT VFR BLD AUTO: 25.8 % (ref 40–54)
HCV AB SERPL QL IA: NEGATIVE
HGB BLD-MCNC: 8.7 G/DL (ref 14–18)
IMM GRANULOCYTES # BLD AUTO: 0.01 K/UL (ref 0–0.04)
IMM GRANULOCYTES NFR BLD AUTO: 0.2 % (ref 0–0.5)
LYMPHOCYTES # BLD AUTO: 1 K/UL (ref 1–4.8)
LYMPHOCYTES NFR BLD: 15.1 % (ref 18–48)
MAGNESIUM SERPL-MCNC: 1.7 MG/DL (ref 1.6–2.6)
MCH RBC QN AUTO: 33.6 PG (ref 27–31)
MCHC RBC AUTO-ENTMCNC: 33.7 G/DL (ref 32–36)
MCV RBC AUTO: 100 FL (ref 82–98)
MONOCYTES # BLD AUTO: 0.6 K/UL (ref 0.3–1)
MONOCYTES NFR BLD: 8.8 % (ref 4–15)
NEUTROPHILS # BLD AUTO: 4.5 K/UL (ref 1.8–7.7)
NEUTROPHILS NFR BLD: 71.3 % (ref 38–73)
NRBC BLD-RTO: 0 /100 WBC
PHOSPHATE SERPL-MCNC: 3.3 MG/DL (ref 2.7–4.5)
PLATELET # BLD AUTO: 142 K/UL (ref 150–450)
PMV BLD AUTO: 12.2 FL (ref 9.2–12.9)
POTASSIUM SERPL-SCNC: 4 MMOL/L (ref 3.5–5.1)
RBC # BLD AUTO: 2.59 M/UL (ref 4.6–6.2)
SODIUM SERPL-SCNC: 134 MMOL/L (ref 136–145)
WBC # BLD AUTO: 6.28 K/UL (ref 3.9–12.7)

## 2021-10-11 PROCEDURE — 83735 ASSAY OF MAGNESIUM: CPT | Performed by: HOSPITALIST

## 2021-10-11 PROCEDURE — 97530 THERAPEUTIC ACTIVITIES: CPT

## 2021-10-11 PROCEDURE — 63600175 PHARM REV CODE 636 W HCPCS: Performed by: STUDENT IN AN ORGANIZED HEALTH CARE EDUCATION/TRAINING PROGRAM

## 2021-10-11 PROCEDURE — 25000003 PHARM REV CODE 250: Performed by: HOSPITALIST

## 2021-10-11 PROCEDURE — 84100 ASSAY OF PHOSPHORUS: CPT | Performed by: HOSPITALIST

## 2021-10-11 PROCEDURE — 85025 COMPLETE CBC W/AUTO DIFF WBC: CPT | Performed by: HOSPITALIST

## 2021-10-11 PROCEDURE — 36415 COLL VENOUS BLD VENIPUNCTURE: CPT | Performed by: HOSPITALIST

## 2021-10-11 PROCEDURE — 11000001 HC ACUTE MED/SURG PRIVATE ROOM

## 2021-10-11 PROCEDURE — 25000003 PHARM REV CODE 250: Performed by: INTERNAL MEDICINE

## 2021-10-11 PROCEDURE — 97535 SELF CARE MNGMENT TRAINING: CPT

## 2021-10-11 PROCEDURE — 97116 GAIT TRAINING THERAPY: CPT

## 2021-10-11 PROCEDURE — 99232 PR SUBSEQUENT HOSPITAL CARE,LEVL II: ICD-10-PCS | Mod: ,,, | Performed by: INTERNAL MEDICINE

## 2021-10-11 PROCEDURE — 99232 SBSQ HOSP IP/OBS MODERATE 35: CPT | Mod: ,,, | Performed by: INTERNAL MEDICINE

## 2021-10-11 PROCEDURE — 80048 BASIC METABOLIC PNL TOTAL CA: CPT | Performed by: HOSPITALIST

## 2021-10-11 RX ORDER — METHOCARBAMOL 750 MG/1
750 TABLET, FILM COATED ORAL 4 TIMES DAILY
Status: DISCONTINUED | OUTPATIENT
Start: 2021-10-11 | End: 2021-10-12 | Stop reason: HOSPADM

## 2021-10-11 RX ADMIN — GABAPENTIN 100 MG: 100 CAPSULE ORAL at 08:10

## 2021-10-11 RX ADMIN — MUPIROCIN: 20 OINTMENT TOPICAL at 08:10

## 2021-10-11 RX ADMIN — ZONISAMIDE 100 MG: 100 CAPSULE ORAL at 08:10

## 2021-10-11 RX ADMIN — ENOXAPARIN SODIUM 40 MG: 40 INJECTION, SOLUTION INTRAVENOUS; SUBCUTANEOUS at 05:10

## 2021-10-11 RX ADMIN — PHENOBARBITAL 32.4 MG: 32.4 TABLET ORAL at 09:10

## 2021-10-11 RX ADMIN — METHOCARBAMOL 500 MG: 500 TABLET ORAL at 08:10

## 2021-10-11 RX ADMIN — METHOCARBAMOL 500 MG: 500 TABLET ORAL at 01:10

## 2021-10-11 RX ADMIN — METHOCARBAMOL 750 MG: 750 TABLET ORAL at 05:10

## 2021-10-11 RX ADMIN — POLYETHYLENE GLYCOL 3350 17 G: 17 POWDER, FOR SOLUTION ORAL at 08:10

## 2021-10-11 RX ADMIN — METHOCARBAMOL 750 MG: 750 TABLET ORAL at 08:10

## 2021-10-11 RX ADMIN — ACETAMINOPHEN 1000 MG: 500 TABLET ORAL at 01:10

## 2021-10-11 RX ADMIN — OXYCODONE 10 MG: 10 TABLET ORAL at 07:10

## 2021-10-11 RX ADMIN — Medication 2000 UNITS: at 08:10

## 2021-10-11 RX ADMIN — ACETAMINOPHEN 1000 MG: 500 TABLET ORAL at 05:10

## 2021-10-11 RX ADMIN — OXYCODONE 10 MG: 10 TABLET ORAL at 01:10

## 2021-10-12 ENCOUNTER — HOSPITAL ENCOUNTER (INPATIENT)
Facility: HOSPITAL | Age: 71
LOS: 14 days | Discharge: HOME HEALTH | DRG: 559 | End: 2021-10-26
Attending: HOSPITALIST | Admitting: HOSPITALIST
Payer: MEDICARE

## 2021-10-12 VITALS
OXYGEN SATURATION: 96 % | BODY MASS INDEX: 19.29 KG/M2 | HEART RATE: 68 BPM | DIASTOLIC BLOOD PRESSURE: 57 MMHG | TEMPERATURE: 98 F | WEIGHT: 120 LBS | HEIGHT: 66 IN | RESPIRATION RATE: 16 BRPM | SYSTOLIC BLOOD PRESSURE: 123 MMHG

## 2021-10-12 DIAGNOSIS — L60.2 ONYCHAUXIS: Primary | ICD-10-CM

## 2021-10-12 DIAGNOSIS — Z96.649 PERIPROSTHETIC FRACTURE AROUND INTERNAL PROSTHETIC HIP JOINT: ICD-10-CM

## 2021-10-12 DIAGNOSIS — M97.8XXA PERIPROSTHETIC FRACTURE AROUND INTERNAL PROSTHETIC HIP JOINT: ICD-10-CM

## 2021-10-12 LAB
ANION GAP SERPL CALC-SCNC: 10 MMOL/L (ref 8–16)
BASOPHILS # BLD AUTO: 0.03 K/UL (ref 0–0.2)
BASOPHILS NFR BLD: 0.5 % (ref 0–1.9)
BUN SERPL-MCNC: 20 MG/DL (ref 8–23)
CALCIUM SERPL-MCNC: 9.1 MG/DL (ref 8.7–10.5)
CHLORIDE SERPL-SCNC: 107 MMOL/L (ref 95–110)
CO2 SERPL-SCNC: 19 MMOL/L (ref 23–29)
CREAT SERPL-MCNC: 1.6 MG/DL (ref 0.5–1.4)
DIFFERENTIAL METHOD: ABNORMAL
EOSINOPHIL # BLD AUTO: 0.3 K/UL (ref 0–0.5)
EOSINOPHIL NFR BLD: 5.9 % (ref 0–8)
ERYTHROCYTE [DISTWIDTH] IN BLOOD BY AUTOMATED COUNT: 13.2 % (ref 11.5–14.5)
EST. GFR  (AFRICAN AMERICAN): 49 ML/MIN/1.73 M^2
EST. GFR  (NON AFRICAN AMERICAN): 42.4 ML/MIN/1.73 M^2
GLUCOSE SERPL-MCNC: 83 MG/DL (ref 70–110)
HCT VFR BLD AUTO: 25.7 % (ref 40–54)
HGB BLD-MCNC: 8.1 G/DL (ref 14–18)
IMM GRANULOCYTES # BLD AUTO: 0.02 K/UL (ref 0–0.04)
IMM GRANULOCYTES NFR BLD AUTO: 0.3 % (ref 0–0.5)
LYMPHOCYTES # BLD AUTO: 1.1 K/UL (ref 1–4.8)
LYMPHOCYTES NFR BLD: 18.8 % (ref 18–48)
MAGNESIUM SERPL-MCNC: 1.8 MG/DL (ref 1.6–2.6)
MCH RBC QN AUTO: 32.7 PG (ref 27–31)
MCHC RBC AUTO-ENTMCNC: 31.5 G/DL (ref 32–36)
MCV RBC AUTO: 104 FL (ref 82–98)
MONOCYTES # BLD AUTO: 0.6 K/UL (ref 0.3–1)
MONOCYTES NFR BLD: 10.5 % (ref 4–15)
NEUTROPHILS # BLD AUTO: 3.7 K/UL (ref 1.8–7.7)
NEUTROPHILS NFR BLD: 64 % (ref 38–73)
NRBC BLD-RTO: 0 /100 WBC
PHOSPHATE SERPL-MCNC: 4.7 MG/DL (ref 2.7–4.5)
PLATELET # BLD AUTO: 132 K/UL (ref 150–450)
PMV BLD AUTO: 12.6 FL (ref 9.2–12.9)
POTASSIUM SERPL-SCNC: 4.1 MMOL/L (ref 3.5–5.1)
RBC # BLD AUTO: 2.48 M/UL (ref 4.6–6.2)
SARS-COV-2 RNA RESP QL NAA+PROBE: NOT DETECTED
SODIUM SERPL-SCNC: 136 MMOL/L (ref 136–145)
WBC # BLD AUTO: 5.8 K/UL (ref 3.9–12.7)

## 2021-10-12 PROCEDURE — 25000003 PHARM REV CODE 250: Performed by: PHYSICIAN ASSISTANT

## 2021-10-12 PROCEDURE — 25000003 PHARM REV CODE 250: Performed by: INTERNAL MEDICINE

## 2021-10-12 PROCEDURE — 80048 BASIC METABOLIC PNL TOTAL CA: CPT | Performed by: HOSPITALIST

## 2021-10-12 PROCEDURE — U0005 INFEC AGEN DETEC AMPLI PROBE: HCPCS | Performed by: INTERNAL MEDICINE

## 2021-10-12 PROCEDURE — 83735 ASSAY OF MAGNESIUM: CPT | Performed by: HOSPITALIST

## 2021-10-12 PROCEDURE — 11000004 HC SNF PRIVATE

## 2021-10-12 PROCEDURE — 63600175 PHARM REV CODE 636 W HCPCS: Performed by: STUDENT IN AN ORGANIZED HEALTH CARE EDUCATION/TRAINING PROGRAM

## 2021-10-12 PROCEDURE — 84100 ASSAY OF PHOSPHORUS: CPT | Performed by: HOSPITALIST

## 2021-10-12 PROCEDURE — 97535 SELF CARE MNGMENT TRAINING: CPT

## 2021-10-12 PROCEDURE — 99238 PR HOSPITAL DISCHARGE DAY,<30 MIN: ICD-10-PCS | Mod: ,,, | Performed by: INTERNAL MEDICINE

## 2021-10-12 PROCEDURE — 36415 COLL VENOUS BLD VENIPUNCTURE: CPT | Performed by: HOSPITALIST

## 2021-10-12 PROCEDURE — 97112 NEUROMUSCULAR REEDUCATION: CPT

## 2021-10-12 PROCEDURE — 25000003 PHARM REV CODE 250: Performed by: HOSPITALIST

## 2021-10-12 PROCEDURE — 99238 HOSP IP/OBS DSCHRG MGMT 30/<: CPT | Mod: ,,, | Performed by: INTERNAL MEDICINE

## 2021-10-12 PROCEDURE — U0003 INFECTIOUS AGENT DETECTION BY NUCLEIC ACID (DNA OR RNA); SEVERE ACUTE RESPIRATORY SYNDROME CORONAVIRUS 2 (SARS-COV-2) (CORONAVIRUS DISEASE [COVID-19]), AMPLIFIED PROBE TECHNIQUE, MAKING USE OF HIGH THROUGHPUT TECHNOLOGIES AS DESCRIBED BY CMS-2020-01-R: HCPCS | Performed by: INTERNAL MEDICINE

## 2021-10-12 PROCEDURE — 85025 COMPLETE CBC W/AUTO DIFF WBC: CPT | Performed by: HOSPITALIST

## 2021-10-12 PROCEDURE — 97530 THERAPEUTIC ACTIVITIES: CPT

## 2021-10-12 RX ORDER — ACETAMINOPHEN 500 MG
1000 TABLET ORAL EVERY 8 HOURS
Status: DISCONTINUED | OUTPATIENT
Start: 2021-10-12 | End: 2021-10-25

## 2021-10-12 RX ORDER — BISACODYL 10 MG
10 SUPPOSITORY, RECTAL RECTAL DAILY PRN
Status: CANCELLED | OUTPATIENT
Start: 2021-10-12

## 2021-10-12 RX ORDER — ZONISAMIDE 100 MG/1
100 CAPSULE ORAL 2 TIMES DAILY
Status: CANCELLED | OUTPATIENT
Start: 2021-10-12

## 2021-10-12 RX ORDER — ACETAMINOPHEN 500 MG
1000 TABLET ORAL EVERY 8 HOURS
Status: CANCELLED | OUTPATIENT
Start: 2021-10-12

## 2021-10-12 RX ORDER — PHENOBARBITAL 32.4 MG/1
32 TABLET ORAL DAILY
Status: CANCELLED | OUTPATIENT
Start: 2021-10-13

## 2021-10-12 RX ORDER — ENOXAPARIN SODIUM 100 MG/ML
40 INJECTION SUBCUTANEOUS EVERY 24 HOURS
Status: DISCONTINUED | OUTPATIENT
Start: 2021-10-13 | End: 2021-10-16

## 2021-10-12 RX ORDER — CHOLECALCIFEROL (VITAMIN D3) 25 MCG
2000 TABLET ORAL DAILY
Status: CANCELLED | OUTPATIENT
Start: 2021-10-13

## 2021-10-12 RX ORDER — OXYCODONE HYDROCHLORIDE 10 MG/1
10 TABLET ORAL
Status: CANCELLED | OUTPATIENT
Start: 2021-10-12

## 2021-10-12 RX ORDER — OXYCODONE HYDROCHLORIDE 5 MG/1
5 TABLET ORAL
Status: DISCONTINUED | OUTPATIENT
Start: 2021-10-12 | End: 2021-10-14

## 2021-10-12 RX ORDER — ONDANSETRON 4 MG/1
4 TABLET, ORALLY DISINTEGRATING ORAL EVERY 8 HOURS PRN
Status: CANCELLED | OUTPATIENT
Start: 2021-10-12

## 2021-10-12 RX ORDER — TALC
6 POWDER (GRAM) TOPICAL NIGHTLY PRN
Status: CANCELLED | OUTPATIENT
Start: 2021-10-12

## 2021-10-12 RX ORDER — PHENOBARBITAL 32.4 MG/1
32 TABLET ORAL DAILY
Status: DISCONTINUED | OUTPATIENT
Start: 2021-10-13 | End: 2021-10-26 | Stop reason: HOSPADM

## 2021-10-12 RX ORDER — CHOLECALCIFEROL (VITAMIN D3) 25 MCG
2000 TABLET ORAL DAILY
Status: DISCONTINUED | OUTPATIENT
Start: 2021-10-13 | End: 2021-10-26 | Stop reason: HOSPADM

## 2021-10-12 RX ORDER — TALC
6 POWDER (GRAM) TOPICAL NIGHTLY PRN
Status: DISCONTINUED | OUTPATIENT
Start: 2021-10-12 | End: 2021-10-26 | Stop reason: HOSPADM

## 2021-10-12 RX ORDER — ONDANSETRON 4 MG/1
4 TABLET, ORALLY DISINTEGRATING ORAL EVERY 8 HOURS PRN
Status: DISCONTINUED | OUTPATIENT
Start: 2021-10-12 | End: 2021-10-26 | Stop reason: HOSPADM

## 2021-10-12 RX ORDER — BISACODYL 10 MG
10 SUPPOSITORY, RECTAL RECTAL DAILY PRN
Status: DISCONTINUED | OUTPATIENT
Start: 2021-10-12 | End: 2021-10-26 | Stop reason: HOSPADM

## 2021-10-12 RX ORDER — METHOCARBAMOL 750 MG/1
750 TABLET, FILM COATED ORAL 4 TIMES DAILY
Status: DISCONTINUED | OUTPATIENT
Start: 2021-10-12 | End: 2021-10-25

## 2021-10-12 RX ORDER — METHOCARBAMOL 750 MG/1
750 TABLET, FILM COATED ORAL 4 TIMES DAILY
Status: CANCELLED | OUTPATIENT
Start: 2021-10-12

## 2021-10-12 RX ORDER — OXYCODONE HYDROCHLORIDE 5 MG/1
5 TABLET ORAL
Status: CANCELLED | OUTPATIENT
Start: 2021-10-12

## 2021-10-12 RX ORDER — ENOXAPARIN SODIUM 100 MG/ML
40 INJECTION SUBCUTANEOUS EVERY 24 HOURS
Status: CANCELLED | OUTPATIENT
Start: 2021-10-12

## 2021-10-12 RX ORDER — GABAPENTIN 100 MG/1
100 CAPSULE ORAL 2 TIMES DAILY
Status: CANCELLED | OUTPATIENT
Start: 2021-10-12

## 2021-10-12 RX ORDER — POLYETHYLENE GLYCOL 3350 17 G/17G
17 POWDER, FOR SOLUTION ORAL DAILY
Status: DISCONTINUED | OUTPATIENT
Start: 2021-10-13 | End: 2021-10-25

## 2021-10-12 RX ORDER — GABAPENTIN 100 MG/1
100 CAPSULE ORAL 2 TIMES DAILY
Status: DISCONTINUED | OUTPATIENT
Start: 2021-10-12 | End: 2021-10-26 | Stop reason: HOSPADM

## 2021-10-12 RX ORDER — POLYETHYLENE GLYCOL 3350 17 G/17G
17 POWDER, FOR SOLUTION ORAL DAILY
Status: CANCELLED | OUTPATIENT
Start: 2021-10-13

## 2021-10-12 RX ORDER — ZONISAMIDE 100 MG/1
100 CAPSULE ORAL 2 TIMES DAILY
Status: DISCONTINUED | OUTPATIENT
Start: 2021-10-12 | End: 2021-10-26 | Stop reason: HOSPADM

## 2021-10-12 RX ORDER — OXYCODONE HYDROCHLORIDE 10 MG/1
10 TABLET ORAL
Status: DISCONTINUED | OUTPATIENT
Start: 2021-10-12 | End: 2021-10-14

## 2021-10-12 RX ADMIN — ZONISAMIDE 100 MG: 100 CAPSULE ORAL at 10:10

## 2021-10-12 RX ADMIN — METHOCARBAMOL 750 MG: 750 TABLET ORAL at 10:10

## 2021-10-12 RX ADMIN — METHOCARBAMOL 750 MG: 750 TABLET ORAL at 12:10

## 2021-10-12 RX ADMIN — Medication 2000 UNITS: at 09:10

## 2021-10-12 RX ADMIN — ACETAMINOPHEN 1000 MG: 500 TABLET ORAL at 05:10

## 2021-10-12 RX ADMIN — OXYCODONE 10 MG: 10 TABLET ORAL at 04:10

## 2021-10-12 RX ADMIN — GABAPENTIN 100 MG: 100 CAPSULE ORAL at 10:10

## 2021-10-12 RX ADMIN — ENOXAPARIN SODIUM 40 MG: 40 INJECTION, SOLUTION INTRAVENOUS; SUBCUTANEOUS at 04:10

## 2021-10-12 RX ADMIN — ACETAMINOPHEN 1000 MG: 500 TABLET ORAL at 03:10

## 2021-10-12 RX ADMIN — POLYETHYLENE GLYCOL 3350 17 G: 17 POWDER, FOR SOLUTION ORAL at 09:10

## 2021-10-12 RX ADMIN — ZONISAMIDE 100 MG: 100 CAPSULE ORAL at 09:10

## 2021-10-12 RX ADMIN — GABAPENTIN 100 MG: 100 CAPSULE ORAL at 09:10

## 2021-10-12 RX ADMIN — HYDRALAZINE HYDROCHLORIDE 50 MG: 50 TABLET ORAL at 04:10

## 2021-10-12 RX ADMIN — MUPIROCIN: 20 OINTMENT TOPICAL at 09:10

## 2021-10-12 RX ADMIN — METHOCARBAMOL 750 MG: 750 TABLET ORAL at 04:10

## 2021-10-12 RX ADMIN — METHOCARBAMOL 750 MG: 750 TABLET ORAL at 09:10

## 2021-10-12 RX ADMIN — ACETAMINOPHEN 1000 MG: 500 TABLET ORAL at 10:10

## 2021-10-12 RX ADMIN — HYDRALAZINE HYDROCHLORIDE 50 MG: 50 TABLET ORAL at 03:10

## 2021-10-12 RX ADMIN — PHENOBARBITAL 32.4 MG: 32.4 TABLET ORAL at 09:10

## 2021-10-13 PROCEDURE — 25000003 PHARM REV CODE 250: Performed by: INTERNAL MEDICINE

## 2021-10-13 PROCEDURE — 97530 THERAPEUTIC ACTIVITIES: CPT

## 2021-10-13 PROCEDURE — 97165 OT EVAL LOW COMPLEX 30 MIN: CPT

## 2021-10-13 PROCEDURE — 97535 SELF CARE MNGMENT TRAINING: CPT

## 2021-10-13 PROCEDURE — 97162 PT EVAL MOD COMPLEX 30 MIN: CPT

## 2021-10-13 PROCEDURE — 11000004 HC SNF PRIVATE

## 2021-10-13 PROCEDURE — 63600175 PHARM REV CODE 636 W HCPCS: Performed by: INTERNAL MEDICINE

## 2021-10-13 RX ADMIN — METHOCARBAMOL 750 MG: 750 TABLET ORAL at 04:10

## 2021-10-13 RX ADMIN — POLYETHYLENE GLYCOL 3350 17 G: 17 POWDER, FOR SOLUTION ORAL at 09:10

## 2021-10-13 RX ADMIN — Medication 2000 UNITS: at 09:10

## 2021-10-13 RX ADMIN — METHOCARBAMOL 750 MG: 750 TABLET ORAL at 09:10

## 2021-10-13 RX ADMIN — PHENOBARBITAL 32.4 MG: 32.4 TABLET ORAL at 02:10

## 2021-10-13 RX ADMIN — ACETAMINOPHEN 1000 MG: 500 TABLET ORAL at 06:10

## 2021-10-13 RX ADMIN — GABAPENTIN 100 MG: 100 CAPSULE ORAL at 09:10

## 2021-10-13 RX ADMIN — METHOCARBAMOL 750 MG: 750 TABLET ORAL at 12:10

## 2021-10-13 RX ADMIN — ACETAMINOPHEN 1000 MG: 500 TABLET ORAL at 02:10

## 2021-10-13 RX ADMIN — ZONISAMIDE 100 MG: 100 CAPSULE ORAL at 09:10

## 2021-10-13 RX ADMIN — OXYCODONE 5 MG: 5 TABLET ORAL at 09:10

## 2021-10-13 RX ADMIN — ENOXAPARIN SODIUM 40 MG: 100 INJECTION SUBCUTANEOUS at 04:10

## 2021-10-13 RX ADMIN — ACETAMINOPHEN 1000 MG: 500 TABLET ORAL at 09:10

## 2021-10-14 PROBLEM — E43 SEVERE MALNUTRITION: Status: ACTIVE | Noted: 2021-10-14

## 2021-10-14 LAB
ANION GAP SERPL CALC-SCNC: 14 MMOL/L (ref 8–16)
BASOPHILS # BLD AUTO: 0.03 K/UL (ref 0–0.2)
BASOPHILS NFR BLD: 0.6 % (ref 0–1.9)
BUN SERPL-MCNC: 35 MG/DL (ref 8–23)
CALCIUM SERPL-MCNC: 9.6 MG/DL (ref 8.7–10.5)
CHLORIDE SERPL-SCNC: 103 MMOL/L (ref 95–110)
CO2 SERPL-SCNC: 15 MMOL/L (ref 23–29)
CREAT SERPL-MCNC: 1.9 MG/DL (ref 0.5–1.4)
DIFFERENTIAL METHOD: ABNORMAL
EOSINOPHIL # BLD AUTO: 0.3 K/UL (ref 0–0.5)
EOSINOPHIL NFR BLD: 6.7 % (ref 0–8)
ERYTHROCYTE [DISTWIDTH] IN BLOOD BY AUTOMATED COUNT: 13.4 % (ref 11.5–14.5)
EST. GFR  (AFRICAN AMERICAN): 39.8 ML/MIN/1.73 M^2
EST. GFR  (NON AFRICAN AMERICAN): 34.5 ML/MIN/1.73 M^2
GLUCOSE SERPL-MCNC: 78 MG/DL (ref 70–110)
HCT VFR BLD AUTO: 28.7 % (ref 40–54)
HGB BLD-MCNC: 8.7 G/DL (ref 14–18)
IMM GRANULOCYTES # BLD AUTO: 0.01 K/UL (ref 0–0.04)
IMM GRANULOCYTES NFR BLD AUTO: 0.2 % (ref 0–0.5)
LYMPHOCYTES # BLD AUTO: 1 K/UL (ref 1–4.8)
LYMPHOCYTES NFR BLD: 20.6 % (ref 18–48)
MAGNESIUM SERPL-MCNC: 2.1 MG/DL (ref 1.6–2.6)
MCH RBC QN AUTO: 33.1 PG (ref 27–31)
MCHC RBC AUTO-ENTMCNC: 30.3 G/DL (ref 32–36)
MCV RBC AUTO: 109 FL (ref 82–98)
MONOCYTES # BLD AUTO: 0.5 K/UL (ref 0.3–1)
MONOCYTES NFR BLD: 9.5 % (ref 4–15)
NEUTROPHILS # BLD AUTO: 3 K/UL (ref 1.8–7.7)
NEUTROPHILS NFR BLD: 62.4 % (ref 38–73)
NRBC BLD-RTO: 0 /100 WBC
PHOSPHATE SERPL-MCNC: 5.5 MG/DL (ref 2.7–4.5)
PLATELET # BLD AUTO: 181 K/UL (ref 150–450)
PMV BLD AUTO: 12.6 FL (ref 9.2–12.9)
POTASSIUM SERPL-SCNC: 4.7 MMOL/L (ref 3.5–5.1)
RBC # BLD AUTO: 2.63 M/UL (ref 4.6–6.2)
SODIUM SERPL-SCNC: 132 MMOL/L (ref 136–145)
WBC # BLD AUTO: 4.75 K/UL (ref 3.9–12.7)

## 2021-10-14 PROCEDURE — 25000003 PHARM REV CODE 250: Performed by: NURSE PRACTITIONER

## 2021-10-14 PROCEDURE — 63600175 PHARM REV CODE 636 W HCPCS: Performed by: INTERNAL MEDICINE

## 2021-10-14 PROCEDURE — 36415 COLL VENOUS BLD VENIPUNCTURE: CPT | Performed by: INTERNAL MEDICINE

## 2021-10-14 PROCEDURE — 97535 SELF CARE MNGMENT TRAINING: CPT | Mod: CO

## 2021-10-14 PROCEDURE — 25000003 PHARM REV CODE 250: Performed by: INTERNAL MEDICINE

## 2021-10-14 PROCEDURE — 97110 THERAPEUTIC EXERCISES: CPT | Mod: CO

## 2021-10-14 PROCEDURE — 97530 THERAPEUTIC ACTIVITIES: CPT | Mod: CQ

## 2021-10-14 PROCEDURE — 84100 ASSAY OF PHOSPHORUS: CPT | Performed by: NURSE PRACTITIONER

## 2021-10-14 PROCEDURE — 11000004 HC SNF PRIVATE

## 2021-10-14 PROCEDURE — 83735 ASSAY OF MAGNESIUM: CPT | Performed by: NURSE PRACTITIONER

## 2021-10-14 PROCEDURE — 85025 COMPLETE CBC W/AUTO DIFF WBC: CPT | Performed by: NURSE PRACTITIONER

## 2021-10-14 PROCEDURE — 80048 BASIC METABOLIC PNL TOTAL CA: CPT | Performed by: INTERNAL MEDICINE

## 2021-10-14 RX ORDER — SODIUM BICARBONATE 650 MG/1
650 TABLET ORAL 2 TIMES DAILY
Status: COMPLETED | OUTPATIENT
Start: 2021-10-14 | End: 2021-10-16

## 2021-10-14 RX ADMIN — SODIUM BICARBONATE 650 MG TABLET 650 MG: at 08:10

## 2021-10-14 RX ADMIN — METHOCARBAMOL 750 MG: 750 TABLET ORAL at 09:10

## 2021-10-14 RX ADMIN — PHENOBARBITAL 32.4 MG: 32.4 TABLET ORAL at 09:10

## 2021-10-14 RX ADMIN — METHOCARBAMOL 750 MG: 750 TABLET ORAL at 01:10

## 2021-10-14 RX ADMIN — GABAPENTIN 100 MG: 100 CAPSULE ORAL at 09:10

## 2021-10-14 RX ADMIN — ZONISAMIDE 100 MG: 100 CAPSULE ORAL at 09:10

## 2021-10-14 RX ADMIN — OXYCODONE HYDROCHLORIDE 10 MG: 10 TABLET ORAL at 06:10

## 2021-10-14 RX ADMIN — ACETAMINOPHEN 1000 MG: 500 TABLET ORAL at 08:10

## 2021-10-14 RX ADMIN — METHOCARBAMOL 750 MG: 750 TABLET ORAL at 05:10

## 2021-10-14 RX ADMIN — ACETAMINOPHEN 1000 MG: 500 TABLET ORAL at 06:10

## 2021-10-14 RX ADMIN — ACETAMINOPHEN 1000 MG: 500 TABLET ORAL at 01:10

## 2021-10-14 RX ADMIN — POLYETHYLENE GLYCOL 3350 17 G: 17 POWDER, FOR SOLUTION ORAL at 09:10

## 2021-10-14 RX ADMIN — OXYCODONE HYDROCHLORIDE 10 MG: 10 TABLET ORAL at 09:10

## 2021-10-14 RX ADMIN — Medication 2000 UNITS: at 09:10

## 2021-10-14 RX ADMIN — ENOXAPARIN SODIUM 40 MG: 100 INJECTION SUBCUTANEOUS at 05:10

## 2021-10-15 PROCEDURE — 99305 PR NURSING FACILITY CARE, INIT, MOD SEVERITY: ICD-10-PCS | Mod: AI,,, | Performed by: HOSPITALIST

## 2021-10-15 PROCEDURE — 25000003 PHARM REV CODE 250: Performed by: NURSE PRACTITIONER

## 2021-10-15 PROCEDURE — 99305 1ST NF CARE MODERATE MDM 35: CPT | Mod: AI,,, | Performed by: HOSPITALIST

## 2021-10-15 PROCEDURE — 97535 SELF CARE MNGMENT TRAINING: CPT | Mod: CO

## 2021-10-15 PROCEDURE — 97116 GAIT TRAINING THERAPY: CPT | Mod: CQ

## 2021-10-15 PROCEDURE — 11000004 HC SNF PRIVATE

## 2021-10-15 PROCEDURE — 63600175 PHARM REV CODE 636 W HCPCS: Performed by: INTERNAL MEDICINE

## 2021-10-15 PROCEDURE — 25000003 PHARM REV CODE 250: Performed by: INTERNAL MEDICINE

## 2021-10-15 PROCEDURE — 97530 THERAPEUTIC ACTIVITIES: CPT | Mod: CQ

## 2021-10-15 RX ADMIN — METHOCARBAMOL 750 MG: 750 TABLET ORAL at 09:10

## 2021-10-15 RX ADMIN — Medication 2000 UNITS: at 11:10

## 2021-10-15 RX ADMIN — ACETAMINOPHEN 1000 MG: 500 TABLET ORAL at 09:10

## 2021-10-15 RX ADMIN — METHOCARBAMOL 750 MG: 750 TABLET ORAL at 05:10

## 2021-10-15 RX ADMIN — ACETAMINOPHEN 1000 MG: 500 TABLET ORAL at 01:10

## 2021-10-15 RX ADMIN — METHOCARBAMOL 750 MG: 750 TABLET ORAL at 11:10

## 2021-10-15 RX ADMIN — GABAPENTIN 100 MG: 100 CAPSULE ORAL at 11:10

## 2021-10-15 RX ADMIN — SODIUM BICARBONATE 650 MG TABLET 650 MG: at 09:10

## 2021-10-15 RX ADMIN — SODIUM BICARBONATE 650 MG TABLET 650 MG: at 11:10

## 2021-10-15 RX ADMIN — ZONISAMIDE 100 MG: 100 CAPSULE ORAL at 09:10

## 2021-10-15 RX ADMIN — PHENOBARBITAL 32.4 MG: 32.4 TABLET ORAL at 11:10

## 2021-10-15 RX ADMIN — GABAPENTIN 100 MG: 100 CAPSULE ORAL at 09:10

## 2021-10-15 RX ADMIN — ZONISAMIDE 100 MG: 100 CAPSULE ORAL at 11:10

## 2021-10-15 RX ADMIN — METHOCARBAMOL 750 MG: 750 TABLET ORAL at 01:10

## 2021-10-15 RX ADMIN — ACETAMINOPHEN 1000 MG: 500 TABLET ORAL at 06:10

## 2021-10-15 RX ADMIN — ENOXAPARIN SODIUM 40 MG: 100 INJECTION SUBCUTANEOUS at 05:10

## 2021-10-16 PROCEDURE — 63600175 PHARM REV CODE 636 W HCPCS: Performed by: HOSPITALIST

## 2021-10-16 PROCEDURE — 97116 GAIT TRAINING THERAPY: CPT

## 2021-10-16 PROCEDURE — 97530 THERAPEUTIC ACTIVITIES: CPT

## 2021-10-16 PROCEDURE — 25000003 PHARM REV CODE 250: Performed by: INTERNAL MEDICINE

## 2021-10-16 PROCEDURE — 11000004 HC SNF PRIVATE

## 2021-10-16 PROCEDURE — 25000003 PHARM REV CODE 250: Performed by: NURSE PRACTITIONER

## 2021-10-16 PROCEDURE — 97542 WHEELCHAIR MNGMENT TRAINING: CPT

## 2021-10-16 RX ORDER — ENOXAPARIN SODIUM 100 MG/ML
30 INJECTION SUBCUTANEOUS EVERY 24 HOURS
Status: DISCONTINUED | OUTPATIENT
Start: 2021-10-16 | End: 2021-10-26 | Stop reason: HOSPADM

## 2021-10-16 RX ADMIN — METHOCARBAMOL 750 MG: 750 TABLET ORAL at 09:10

## 2021-10-16 RX ADMIN — Medication 2000 UNITS: at 10:10

## 2021-10-16 RX ADMIN — ACETAMINOPHEN 1000 MG: 500 TABLET ORAL at 01:10

## 2021-10-16 RX ADMIN — METHOCARBAMOL 750 MG: 750 TABLET ORAL at 10:10

## 2021-10-16 RX ADMIN — METHOCARBAMOL 750 MG: 750 TABLET ORAL at 01:10

## 2021-10-16 RX ADMIN — SODIUM BICARBONATE 650 MG TABLET 650 MG: at 10:10

## 2021-10-16 RX ADMIN — ACETAMINOPHEN 1000 MG: 500 TABLET ORAL at 09:10

## 2021-10-16 RX ADMIN — GABAPENTIN 100 MG: 100 CAPSULE ORAL at 09:10

## 2021-10-16 RX ADMIN — METHOCARBAMOL 750 MG: 750 TABLET ORAL at 04:10

## 2021-10-16 RX ADMIN — TRAMADOL HYDROCHLORIDE 25 MG: 50 TABLET, FILM COATED ORAL at 09:10

## 2021-10-16 RX ADMIN — ZONISAMIDE 100 MG: 100 CAPSULE ORAL at 09:10

## 2021-10-16 RX ADMIN — ZONISAMIDE 100 MG: 100 CAPSULE ORAL at 10:10

## 2021-10-16 RX ADMIN — PHENOBARBITAL 32.4 MG: 32.4 TABLET ORAL at 10:10

## 2021-10-16 RX ADMIN — GABAPENTIN 100 MG: 100 CAPSULE ORAL at 10:10

## 2021-10-16 RX ADMIN — ENOXAPARIN SODIUM 30 MG: 30 INJECTION SUBCUTANEOUS at 04:10

## 2021-10-16 RX ADMIN — ACETAMINOPHEN 1000 MG: 500 TABLET ORAL at 05:10

## 2021-10-17 PROCEDURE — 97110 THERAPEUTIC EXERCISES: CPT | Mod: CO

## 2021-10-17 PROCEDURE — 11000004 HC SNF PRIVATE

## 2021-10-17 PROCEDURE — 25000003 PHARM REV CODE 250: Performed by: INTERNAL MEDICINE

## 2021-10-17 PROCEDURE — 63600175 PHARM REV CODE 636 W HCPCS: Performed by: HOSPITALIST

## 2021-10-17 RX ADMIN — ZONISAMIDE 100 MG: 100 CAPSULE ORAL at 10:10

## 2021-10-17 RX ADMIN — ENOXAPARIN SODIUM 30 MG: 30 INJECTION SUBCUTANEOUS at 05:10

## 2021-10-17 RX ADMIN — METHOCARBAMOL 750 MG: 750 TABLET ORAL at 12:10

## 2021-10-17 RX ADMIN — ZONISAMIDE 100 MG: 100 CAPSULE ORAL at 09:10

## 2021-10-17 RX ADMIN — ACETAMINOPHEN 1000 MG: 500 TABLET ORAL at 02:10

## 2021-10-17 RX ADMIN — GABAPENTIN 100 MG: 100 CAPSULE ORAL at 09:10

## 2021-10-17 RX ADMIN — ACETAMINOPHEN 1000 MG: 500 TABLET ORAL at 09:10

## 2021-10-17 RX ADMIN — Medication 2000 UNITS: at 09:10

## 2021-10-17 RX ADMIN — METHOCARBAMOL 750 MG: 750 TABLET ORAL at 09:10

## 2021-10-17 RX ADMIN — ACETAMINOPHEN 1000 MG: 500 TABLET ORAL at 05:10

## 2021-10-17 RX ADMIN — PHENOBARBITAL 32.4 MG: 32.4 TABLET ORAL at 10:10

## 2021-10-17 RX ADMIN — METHOCARBAMOL 750 MG: 750 TABLET ORAL at 05:10

## 2021-10-18 LAB
ANION GAP SERPL CALC-SCNC: 11 MMOL/L (ref 8–16)
BASOPHILS # BLD AUTO: 0.03 K/UL (ref 0–0.2)
BASOPHILS # BLD AUTO: 0.03 K/UL (ref 0–0.2)
BASOPHILS NFR BLD: 0.6 % (ref 0–1.9)
BASOPHILS NFR BLD: 0.6 % (ref 0–1.9)
BUN SERPL-MCNC: 49 MG/DL (ref 8–23)
CALCIUM SERPL-MCNC: 9.5 MG/DL (ref 8.7–10.5)
CHLORIDE SERPL-SCNC: 102 MMOL/L (ref 95–110)
CO2 SERPL-SCNC: 22 MMOL/L (ref 23–29)
CREAT SERPL-MCNC: 1.7 MG/DL (ref 0.5–1.4)
DIFFERENTIAL METHOD: ABNORMAL
DIFFERENTIAL METHOD: ABNORMAL
EOSINOPHIL # BLD AUTO: 0.5 K/UL (ref 0–0.5)
EOSINOPHIL # BLD AUTO: 0.5 K/UL (ref 0–0.5)
EOSINOPHIL NFR BLD: 9.1 % (ref 0–8)
EOSINOPHIL NFR BLD: 9.1 % (ref 0–8)
ERYTHROCYTE [DISTWIDTH] IN BLOOD BY AUTOMATED COUNT: 13.2 % (ref 11.5–14.5)
ERYTHROCYTE [DISTWIDTH] IN BLOOD BY AUTOMATED COUNT: 13.2 % (ref 11.5–14.5)
EST. GFR  (AFRICAN AMERICAN): 45.6 ML/MIN/1.73 M^2
EST. GFR  (NON AFRICAN AMERICAN): 39.4 ML/MIN/1.73 M^2
GLUCOSE SERPL-MCNC: 86 MG/DL (ref 70–110)
HCT VFR BLD AUTO: 23.8 % (ref 40–54)
HCT VFR BLD AUTO: 23.8 % (ref 40–54)
HGB BLD-MCNC: 7.7 G/DL (ref 14–18)
HGB BLD-MCNC: 7.7 G/DL (ref 14–18)
IMM GRANULOCYTES # BLD AUTO: 0.02 K/UL (ref 0–0.04)
IMM GRANULOCYTES # BLD AUTO: 0.02 K/UL (ref 0–0.04)
IMM GRANULOCYTES NFR BLD AUTO: 0.4 % (ref 0–0.5)
IMM GRANULOCYTES NFR BLD AUTO: 0.4 % (ref 0–0.5)
LYMPHOCYTES # BLD AUTO: 1.1 K/UL (ref 1–4.8)
LYMPHOCYTES # BLD AUTO: 1.1 K/UL (ref 1–4.8)
LYMPHOCYTES NFR BLD: 21.7 % (ref 18–48)
LYMPHOCYTES NFR BLD: 21.7 % (ref 18–48)
MAGNESIUM SERPL-MCNC: 2.4 MG/DL (ref 1.6–2.6)
MAGNESIUM SERPL-MCNC: 2.4 MG/DL (ref 1.6–2.6)
MCH RBC QN AUTO: 33.2 PG (ref 27–31)
MCH RBC QN AUTO: 33.2 PG (ref 27–31)
MCHC RBC AUTO-ENTMCNC: 32.4 G/DL (ref 32–36)
MCHC RBC AUTO-ENTMCNC: 32.4 G/DL (ref 32–36)
MCV RBC AUTO: 103 FL (ref 82–98)
MCV RBC AUTO: 103 FL (ref 82–98)
MONOCYTES # BLD AUTO: 0.5 K/UL (ref 0.3–1)
MONOCYTES # BLD AUTO: 0.5 K/UL (ref 0.3–1)
MONOCYTES NFR BLD: 10.5 % (ref 4–15)
MONOCYTES NFR BLD: 10.5 % (ref 4–15)
NEUTROPHILS # BLD AUTO: 2.9 K/UL (ref 1.8–7.7)
NEUTROPHILS # BLD AUTO: 2.9 K/UL (ref 1.8–7.7)
NEUTROPHILS NFR BLD: 57.7 % (ref 38–73)
NEUTROPHILS NFR BLD: 57.7 % (ref 38–73)
NRBC BLD-RTO: 0 /100 WBC
NRBC BLD-RTO: 0 /100 WBC
PHOSPHATE SERPL-MCNC: 5.7 MG/DL (ref 2.7–4.5)
PHOSPHATE SERPL-MCNC: 5.7 MG/DL (ref 2.7–4.5)
PLATELET # BLD AUTO: 271 K/UL (ref 150–450)
PLATELET # BLD AUTO: 271 K/UL (ref 150–450)
PMV BLD AUTO: 12.7 FL (ref 9.2–12.9)
PMV BLD AUTO: 12.7 FL (ref 9.2–12.9)
POTASSIUM SERPL-SCNC: 4.1 MMOL/L (ref 3.5–5.1)
RBC # BLD AUTO: 2.32 M/UL (ref 4.6–6.2)
RBC # BLD AUTO: 2.32 M/UL (ref 4.6–6.2)
SODIUM SERPL-SCNC: 135 MMOL/L (ref 136–145)
WBC # BLD AUTO: 5.03 K/UL (ref 3.9–12.7)
WBC # BLD AUTO: 5.03 K/UL (ref 3.9–12.7)

## 2021-10-18 PROCEDURE — 11730 PR REMOVAL OF NAIL PLATE: ICD-10-PCS | Mod: T7,,, | Performed by: PODIATRIST

## 2021-10-18 PROCEDURE — 97530 THERAPEUTIC ACTIVITIES: CPT | Mod: CO

## 2021-10-18 PROCEDURE — 36415 COLL VENOUS BLD VENIPUNCTURE: CPT | Performed by: INTERNAL MEDICINE

## 2021-10-18 PROCEDURE — 11721 PR DEBRIDEMENT OF NAILS, 6 OR MORE: ICD-10-PCS | Mod: 59,Q8,, | Performed by: PODIATRIST

## 2021-10-18 PROCEDURE — 11056 PR TRIM BENIGN HYPERKERATOTIC SKIN LESION,2-4: ICD-10-PCS | Mod: Q8,,, | Performed by: PODIATRIST

## 2021-10-18 PROCEDURE — 11721 DEBRIDE NAIL 6 OR MORE: CPT | Mod: 59,Q8,, | Performed by: PODIATRIST

## 2021-10-18 PROCEDURE — 84100 ASSAY OF PHOSPHORUS: CPT | Performed by: NURSE PRACTITIONER

## 2021-10-18 PROCEDURE — 25000003 PHARM REV CODE 250: Performed by: NURSE PRACTITIONER

## 2021-10-18 PROCEDURE — 11056 PARNG/CUTG B9 HYPRKR LES 2-4: CPT | Mod: Q8,,, | Performed by: PODIATRIST

## 2021-10-18 PROCEDURE — 85025 COMPLETE CBC W/AUTO DIFF WBC: CPT | Performed by: NURSE PRACTITIONER

## 2021-10-18 PROCEDURE — 99309 SBSQ NF CARE MODERATE MDM 30: CPT | Mod: 25,,, | Performed by: PODIATRIST

## 2021-10-18 PROCEDURE — 25000003 PHARM REV CODE 250: Performed by: INTERNAL MEDICINE

## 2021-10-18 PROCEDURE — 83735 ASSAY OF MAGNESIUM: CPT | Performed by: NURSE PRACTITIONER

## 2021-10-18 PROCEDURE — 99309 PR NURSING FAC CARE, SUBSEQ, SIGNIF COMPLIC: ICD-10-PCS | Mod: 25,,, | Performed by: PODIATRIST

## 2021-10-18 PROCEDURE — 11730 AVULSION NAIL PLATE SIMPLE 1: CPT | Mod: T7,,, | Performed by: PODIATRIST

## 2021-10-18 PROCEDURE — 80048 BASIC METABOLIC PNL TOTAL CA: CPT | Performed by: INTERNAL MEDICINE

## 2021-10-18 PROCEDURE — 11000004 HC SNF PRIVATE

## 2021-10-18 RX ORDER — SODIUM BICARBONATE 650 MG/1
650 TABLET ORAL ONCE
Status: COMPLETED | OUTPATIENT
Start: 2021-10-18 | End: 2021-10-18

## 2021-10-18 RX ORDER — SEVELAMER CARBONATE 800 MG/1
800 TABLET, FILM COATED ORAL
Status: DISCONTINUED | OUTPATIENT
Start: 2021-10-18 | End: 2021-10-26 | Stop reason: HOSPADM

## 2021-10-18 RX ORDER — AMOXICILLIN 250 MG
1 CAPSULE ORAL 2 TIMES DAILY
Status: DISCONTINUED | OUTPATIENT
Start: 2021-10-18 | End: 2021-10-25

## 2021-10-18 RX ADMIN — METHOCARBAMOL 750 MG: 750 TABLET ORAL at 08:10

## 2021-10-18 RX ADMIN — SODIUM BICARBONATE 650 MG TABLET 650 MG: at 09:10

## 2021-10-18 RX ADMIN — ACETAMINOPHEN 1000 MG: 500 TABLET ORAL at 09:10

## 2021-10-18 RX ADMIN — METHOCARBAMOL 750 MG: 750 TABLET ORAL at 12:10

## 2021-10-18 RX ADMIN — ACETAMINOPHEN 1000 MG: 500 TABLET ORAL at 05:10

## 2021-10-18 RX ADMIN — PHENOBARBITAL 32.4 MG: 32.4 TABLET ORAL at 09:10

## 2021-10-18 RX ADMIN — METHOCARBAMOL 750 MG: 750 TABLET ORAL at 05:10

## 2021-10-18 RX ADMIN — ZONISAMIDE 100 MG: 100 CAPSULE ORAL at 08:10

## 2021-10-18 RX ADMIN — SEVELAMER CARBONATE 800 MG: 800 TABLET, FILM COATED ORAL at 12:10

## 2021-10-18 RX ADMIN — GABAPENTIN 100 MG: 100 CAPSULE ORAL at 09:10

## 2021-10-18 RX ADMIN — TRAMADOL HYDROCHLORIDE 25 MG: 50 TABLET, FILM COATED ORAL at 08:10

## 2021-10-18 RX ADMIN — METHOCARBAMOL 750 MG: 750 TABLET ORAL at 09:10

## 2021-10-18 RX ADMIN — Medication 2000 UNITS: at 09:10

## 2021-10-18 RX ADMIN — POLYETHYLENE GLYCOL 3350 17 G: 17 POWDER, FOR SOLUTION ORAL at 09:10

## 2021-10-18 RX ADMIN — ZONISAMIDE 100 MG: 100 CAPSULE ORAL at 09:10

## 2021-10-18 RX ADMIN — TRAMADOL HYDROCHLORIDE 25 MG: 50 TABLET, FILM COATED ORAL at 09:10

## 2021-10-18 RX ADMIN — GABAPENTIN 100 MG: 100 CAPSULE ORAL at 08:10

## 2021-10-19 PROCEDURE — 11000004 HC SNF PRIVATE

## 2021-10-19 PROCEDURE — 97116 GAIT TRAINING THERAPY: CPT

## 2021-10-19 PROCEDURE — 63600175 PHARM REV CODE 636 W HCPCS: Performed by: HOSPITALIST

## 2021-10-19 PROCEDURE — 25000003 PHARM REV CODE 250: Performed by: INTERNAL MEDICINE

## 2021-10-19 PROCEDURE — 97110 THERAPEUTIC EXERCISES: CPT

## 2021-10-19 PROCEDURE — 97530 THERAPEUTIC ACTIVITIES: CPT | Mod: CO

## 2021-10-19 PROCEDURE — 25000003 PHARM REV CODE 250: Performed by: NURSE PRACTITIONER

## 2021-10-19 RX ADMIN — METHOCARBAMOL 750 MG: 750 TABLET ORAL at 09:10

## 2021-10-19 RX ADMIN — GABAPENTIN 100 MG: 100 CAPSULE ORAL at 09:10

## 2021-10-19 RX ADMIN — ENOXAPARIN SODIUM 30 MG: 30 INJECTION SUBCUTANEOUS at 05:10

## 2021-10-19 RX ADMIN — SEVELAMER CARBONATE 800 MG: 800 TABLET, FILM COATED ORAL at 09:10

## 2021-10-19 RX ADMIN — SEVELAMER CARBONATE 800 MG: 800 TABLET, FILM COATED ORAL at 12:10

## 2021-10-19 RX ADMIN — METHOCARBAMOL 750 MG: 750 TABLET ORAL at 12:10

## 2021-10-19 RX ADMIN — ZONISAMIDE 100 MG: 100 CAPSULE ORAL at 09:10

## 2021-10-19 RX ADMIN — ACETAMINOPHEN 1000 MG: 500 TABLET ORAL at 09:10

## 2021-10-19 RX ADMIN — METHOCARBAMOL 750 MG: 750 TABLET ORAL at 05:10

## 2021-10-19 RX ADMIN — PHENOBARBITAL 32.4 MG: 32.4 TABLET ORAL at 09:10

## 2021-10-19 RX ADMIN — Medication 2000 UNITS: at 09:10

## 2021-10-20 PROCEDURE — 97535 SELF CARE MNGMENT TRAINING: CPT | Mod: CO

## 2021-10-20 PROCEDURE — 63600175 PHARM REV CODE 636 W HCPCS: Performed by: HOSPITALIST

## 2021-10-20 PROCEDURE — 25000003 PHARM REV CODE 250: Performed by: NURSE PRACTITIONER

## 2021-10-20 PROCEDURE — 97116 GAIT TRAINING THERAPY: CPT | Mod: CQ

## 2021-10-20 PROCEDURE — 25000003 PHARM REV CODE 250: Performed by: INTERNAL MEDICINE

## 2021-10-20 PROCEDURE — 11000004 HC SNF PRIVATE

## 2021-10-20 PROCEDURE — 97110 THERAPEUTIC EXERCISES: CPT | Mod: CQ

## 2021-10-20 PROCEDURE — 97530 THERAPEUTIC ACTIVITIES: CPT | Mod: CQ

## 2021-10-20 RX ADMIN — SEVELAMER CARBONATE 800 MG: 800 TABLET, FILM COATED ORAL at 05:10

## 2021-10-20 RX ADMIN — METHOCARBAMOL 750 MG: 750 TABLET ORAL at 05:10

## 2021-10-20 RX ADMIN — SEVELAMER CARBONATE 800 MG: 800 TABLET, FILM COATED ORAL at 09:10

## 2021-10-20 RX ADMIN — SENNOSIDES AND DOCUSATE SODIUM 1 TABLET: 8.6; 5 TABLET ORAL at 09:10

## 2021-10-20 RX ADMIN — ENOXAPARIN SODIUM 30 MG: 30 INJECTION SUBCUTANEOUS at 05:10

## 2021-10-20 RX ADMIN — METHOCARBAMOL 750 MG: 750 TABLET ORAL at 09:10

## 2021-10-20 RX ADMIN — ACETAMINOPHEN 1000 MG: 500 TABLET ORAL at 06:10

## 2021-10-20 RX ADMIN — GABAPENTIN 100 MG: 100 CAPSULE ORAL at 09:10

## 2021-10-20 RX ADMIN — PHENOBARBITAL 32.4 MG: 32.4 TABLET ORAL at 09:10

## 2021-10-20 RX ADMIN — ZONISAMIDE 100 MG: 100 CAPSULE ORAL at 09:10

## 2021-10-20 RX ADMIN — METHOCARBAMOL 750 MG: 750 TABLET ORAL at 01:10

## 2021-10-20 RX ADMIN — ACETAMINOPHEN 1000 MG: 500 TABLET ORAL at 09:10

## 2021-10-20 RX ADMIN — Medication 2000 UNITS: at 09:10

## 2021-10-20 RX ADMIN — SEVELAMER CARBONATE 800 MG: 800 TABLET, FILM COATED ORAL at 01:10

## 2021-10-21 ENCOUNTER — OFFICE VISIT (OUTPATIENT)
Dept: PODIATRY | Facility: CLINIC | Age: 71
End: 2021-10-21
Payer: MEDICARE

## 2021-10-21 VITALS
DIASTOLIC BLOOD PRESSURE: 51 MMHG | WEIGHT: 96 LBS | HEART RATE: 69 BPM | HEIGHT: 66 IN | BODY MASS INDEX: 15.43 KG/M2 | SYSTOLIC BLOOD PRESSURE: 164 MMHG | RESPIRATION RATE: 18 BRPM

## 2021-10-21 DIAGNOSIS — R53.81 DEBILITY: ICD-10-CM

## 2021-10-21 DIAGNOSIS — L84 CORN OR CALLUS: ICD-10-CM

## 2021-10-21 DIAGNOSIS — B35.1 ONYCHOMYCOSIS DUE TO DERMATOPHYTE: ICD-10-CM

## 2021-10-21 DIAGNOSIS — N18.30 STAGE 3 CHRONIC KIDNEY DISEASE, UNSPECIFIED WHETHER STAGE 3A OR 3B CKD: Primary | ICD-10-CM

## 2021-10-21 PROCEDURE — 97535 SELF CARE MNGMENT TRAINING: CPT

## 2021-10-21 PROCEDURE — 99213 OFFICE O/P EST LOW 20 MIN: CPT | Mod: 25,S$PBB,, | Performed by: PODIATRIST

## 2021-10-21 PROCEDURE — 25000003 PHARM REV CODE 250: Performed by: NURSE PRACTITIONER

## 2021-10-21 PROCEDURE — 11056 PARNG/CUTG B9 HYPRKR LES 2-4: CPT | Mod: Q8,PBBFAC | Performed by: PODIATRIST

## 2021-10-21 PROCEDURE — 99213 PR OFFICE/OUTPT VISIT, EST, LEVL III, 20-29 MIN: ICD-10-PCS | Mod: 25,S$PBB,, | Performed by: PODIATRIST

## 2021-10-21 PROCEDURE — 11000004 HC SNF PRIVATE

## 2021-10-21 PROCEDURE — 11721 PR DEBRIDEMENT OF NAILS, 6 OR MORE: ICD-10-PCS | Mod: 59,Q8,S$PBB, | Performed by: PODIATRIST

## 2021-10-21 PROCEDURE — 11056 PARNG/CUTG B9 HYPRKR LES 2-4: CPT | Mod: Q8,S$PBB,, | Performed by: PODIATRIST

## 2021-10-21 PROCEDURE — 97116 GAIT TRAINING THERAPY: CPT | Mod: CQ

## 2021-10-21 PROCEDURE — 11721 DEBRIDE NAIL 6 OR MORE: CPT | Mod: Q8,PBBFAC | Performed by: PODIATRIST

## 2021-10-21 PROCEDURE — 63600175 PHARM REV CODE 636 W HCPCS: Performed by: HOSPITALIST

## 2021-10-21 PROCEDURE — 11721 DEBRIDE NAIL 6 OR MORE: CPT | Mod: 59,Q8,S$PBB, | Performed by: PODIATRIST

## 2021-10-21 PROCEDURE — 25000003 PHARM REV CODE 250: Performed by: INTERNAL MEDICINE

## 2021-10-21 PROCEDURE — 99999 PR PBB SHADOW E&M-EST. PATIENT-LVL III: CPT | Mod: PBBFAC,,, | Performed by: PODIATRIST

## 2021-10-21 PROCEDURE — 99213 OFFICE O/P EST LOW 20 MIN: CPT | Mod: PBBFAC | Performed by: PODIATRIST

## 2021-10-21 PROCEDURE — 97542 WHEELCHAIR MNGMENT TRAINING: CPT | Mod: CQ

## 2021-10-21 PROCEDURE — 97530 THERAPEUTIC ACTIVITIES: CPT

## 2021-10-21 PROCEDURE — 11056 PR TRIM BENIGN HYPERKERATOTIC SKIN LESION,2-4: ICD-10-PCS | Mod: Q8,S$PBB,, | Performed by: PODIATRIST

## 2021-10-21 PROCEDURE — 99999 PR PBB SHADOW E&M-EST. PATIENT-LVL III: ICD-10-PCS | Mod: PBBFAC,,, | Performed by: PODIATRIST

## 2021-10-21 RX ORDER — PHENOBARBITAL 32.4 MG/1
81 TABLET ORAL DAILY
Status: ON HOLD | COMMUNITY
Start: 2021-06-29 | End: 2021-10-25 | Stop reason: HOSPADM

## 2021-10-21 RX ADMIN — SENNOSIDES AND DOCUSATE SODIUM 1 TABLET: 8.6; 5 TABLET ORAL at 08:10

## 2021-10-21 RX ADMIN — METHOCARBAMOL 750 MG: 750 TABLET ORAL at 09:10

## 2021-10-21 RX ADMIN — POLYETHYLENE GLYCOL 3350 17 G: 17 POWDER, FOR SOLUTION ORAL at 09:10

## 2021-10-21 RX ADMIN — ZONISAMIDE 100 MG: 100 CAPSULE ORAL at 08:10

## 2021-10-21 RX ADMIN — PHENOBARBITAL 32.4 MG: 32.4 TABLET ORAL at 09:10

## 2021-10-21 RX ADMIN — SEVELAMER CARBONATE 800 MG: 800 TABLET, FILM COATED ORAL at 05:10

## 2021-10-21 RX ADMIN — GABAPENTIN 100 MG: 100 CAPSULE ORAL at 09:10

## 2021-10-21 RX ADMIN — ACETAMINOPHEN 1000 MG: 500 TABLET ORAL at 09:10

## 2021-10-21 RX ADMIN — SEVELAMER CARBONATE 800 MG: 800 TABLET, FILM COATED ORAL at 08:10

## 2021-10-21 RX ADMIN — ACETAMINOPHEN 1000 MG: 500 TABLET ORAL at 06:10

## 2021-10-21 RX ADMIN — METHOCARBAMOL 750 MG: 750 TABLET ORAL at 05:10

## 2021-10-21 RX ADMIN — ACETAMINOPHEN 1000 MG: 500 TABLET ORAL at 02:10

## 2021-10-21 RX ADMIN — ZONISAMIDE 100 MG: 100 CAPSULE ORAL at 09:10

## 2021-10-21 RX ADMIN — Medication 2000 UNITS: at 09:10

## 2021-10-21 RX ADMIN — ENOXAPARIN SODIUM 30 MG: 30 INJECTION SUBCUTANEOUS at 05:10

## 2021-10-22 LAB
ANION GAP SERPL CALC-SCNC: 9 MMOL/L (ref 8–16)
BASOPHILS # BLD AUTO: 0.04 K/UL (ref 0–0.2)
BASOPHILS NFR BLD: 0.7 % (ref 0–1.9)
BUN SERPL-MCNC: 39 MG/DL (ref 8–23)
CALCIUM SERPL-MCNC: 9.3 MG/DL (ref 8.7–10.5)
CHLORIDE SERPL-SCNC: 109 MMOL/L (ref 95–110)
CO2 SERPL-SCNC: 22 MMOL/L (ref 23–29)
CREAT SERPL-MCNC: 1.8 MG/DL (ref 0.5–1.4)
DIFFERENTIAL METHOD: ABNORMAL
EOSINOPHIL # BLD AUTO: 0.7 K/UL (ref 0–0.5)
EOSINOPHIL NFR BLD: 11.4 % (ref 0–8)
ERYTHROCYTE [DISTWIDTH] IN BLOOD BY AUTOMATED COUNT: 13.2 % (ref 11.5–14.5)
EST. GFR  (AFRICAN AMERICAN): 42.5 ML/MIN/1.73 M^2
EST. GFR  (NON AFRICAN AMERICAN): 36.8 ML/MIN/1.73 M^2
GLUCOSE SERPL-MCNC: 88 MG/DL (ref 70–110)
HCT VFR BLD AUTO: 22.8 % (ref 40–54)
HGB BLD-MCNC: 7.5 G/DL (ref 14–18)
IMM GRANULOCYTES # BLD AUTO: 0.02 K/UL (ref 0–0.04)
IMM GRANULOCYTES NFR BLD AUTO: 0.3 % (ref 0–0.5)
LYMPHOCYTES # BLD AUTO: 1.1 K/UL (ref 1–4.8)
LYMPHOCYTES NFR BLD: 17.9 % (ref 18–48)
MAGNESIUM SERPL-MCNC: 2.2 MG/DL (ref 1.6–2.6)
MCH RBC QN AUTO: 32.9 PG (ref 27–31)
MCHC RBC AUTO-ENTMCNC: 32.9 G/DL (ref 32–36)
MCV RBC AUTO: 100 FL (ref 82–98)
MONOCYTES # BLD AUTO: 0.6 K/UL (ref 0.3–1)
MONOCYTES NFR BLD: 10.6 % (ref 4–15)
NEUTROPHILS # BLD AUTO: 3.6 K/UL (ref 1.8–7.7)
NEUTROPHILS NFR BLD: 59.1 % (ref 38–73)
NRBC BLD-RTO: 0 /100 WBC
PHOSPHATE SERPL-MCNC: 4.7 MG/DL (ref 2.7–4.5)
PLATELET # BLD AUTO: 320 K/UL (ref 150–450)
PMV BLD AUTO: 12.1 FL (ref 9.2–12.9)
POTASSIUM SERPL-SCNC: 4.3 MMOL/L (ref 3.5–5.1)
RBC # BLD AUTO: 2.28 M/UL (ref 4.6–6.2)
SODIUM SERPL-SCNC: 140 MMOL/L (ref 136–145)
WBC # BLD AUTO: 6.03 K/UL (ref 3.9–12.7)

## 2021-10-22 PROCEDURE — 11000004 HC SNF PRIVATE

## 2021-10-22 PROCEDURE — 25000003 PHARM REV CODE 250: Performed by: NURSE PRACTITIONER

## 2021-10-22 PROCEDURE — 80048 BASIC METABOLIC PNL TOTAL CA: CPT | Performed by: NURSE PRACTITIONER

## 2021-10-22 PROCEDURE — 36415 COLL VENOUS BLD VENIPUNCTURE: CPT | Performed by: NURSE PRACTITIONER

## 2021-10-22 PROCEDURE — 85025 COMPLETE CBC W/AUTO DIFF WBC: CPT | Performed by: NURSE PRACTITIONER

## 2021-10-22 PROCEDURE — 83735 ASSAY OF MAGNESIUM: CPT | Performed by: NURSE PRACTITIONER

## 2021-10-22 PROCEDURE — 84100 ASSAY OF PHOSPHORUS: CPT | Performed by: NURSE PRACTITIONER

## 2021-10-22 PROCEDURE — 97116 GAIT TRAINING THERAPY: CPT

## 2021-10-22 PROCEDURE — 25000003 PHARM REV CODE 250: Performed by: INTERNAL MEDICINE

## 2021-10-22 PROCEDURE — 97110 THERAPEUTIC EXERCISES: CPT

## 2021-10-22 PROCEDURE — 63600175 PHARM REV CODE 636 W HCPCS: Performed by: HOSPITALIST

## 2021-10-22 RX ORDER — ASCORBIC ACID 250 MG
250 TABLET ORAL DAILY
Status: DISCONTINUED | OUTPATIENT
Start: 2021-10-22 | End: 2021-10-26 | Stop reason: HOSPADM

## 2021-10-22 RX ORDER — AMMONIUM LACTATE 12 G/100G
LOTION TOPICAL 2 TIMES DAILY
Status: DISCONTINUED | OUTPATIENT
Start: 2021-10-22 | End: 2021-10-26 | Stop reason: HOSPADM

## 2021-10-22 RX ORDER — LANOLIN ALCOHOL/MO/W.PET/CERES
1 CREAM (GRAM) TOPICAL DAILY
Status: DISCONTINUED | OUTPATIENT
Start: 2021-10-22 | End: 2021-10-26 | Stop reason: HOSPADM

## 2021-10-22 RX ORDER — SODIUM BICARBONATE 650 MG/1
650 TABLET ORAL ONCE
Status: COMPLETED | OUTPATIENT
Start: 2021-10-22 | End: 2021-10-22

## 2021-10-22 RX ADMIN — Medication: at 10:10

## 2021-10-22 RX ADMIN — ZONISAMIDE 100 MG: 100 CAPSULE ORAL at 08:10

## 2021-10-22 RX ADMIN — FERROUS SULFATE TAB EC 325 MG (65 MG FE EQUIVALENT) 1 EACH: 325 (65 FE) TABLET DELAYED RESPONSE at 01:10

## 2021-10-22 RX ADMIN — ENOXAPARIN SODIUM 30 MG: 30 INJECTION SUBCUTANEOUS at 05:10

## 2021-10-22 RX ADMIN — GABAPENTIN 100 MG: 100 CAPSULE ORAL at 09:10

## 2021-10-22 RX ADMIN — SEVELAMER CARBONATE 800 MG: 800 TABLET, FILM COATED ORAL at 09:10

## 2021-10-22 RX ADMIN — SODIUM BICARBONATE 650 MG TABLET 650 MG: at 01:10

## 2021-10-22 RX ADMIN — METHOCARBAMOL 750 MG: 750 TABLET ORAL at 05:10

## 2021-10-22 RX ADMIN — PHENOBARBITAL 32.4 MG: 32.4 TABLET ORAL at 09:10

## 2021-10-22 RX ADMIN — ZONISAMIDE 100 MG: 100 CAPSULE ORAL at 09:10

## 2021-10-22 RX ADMIN — Medication 2000 UNITS: at 09:10

## 2021-10-22 RX ADMIN — SEVELAMER CARBONATE 800 MG: 800 TABLET, FILM COATED ORAL at 05:10

## 2021-10-22 RX ADMIN — ACETAMINOPHEN 1000 MG: 500 TABLET ORAL at 01:10

## 2021-10-22 RX ADMIN — METHOCARBAMOL 750 MG: 750 TABLET ORAL at 08:10

## 2021-10-22 RX ADMIN — Medication 250 MG: at 01:10

## 2021-10-22 RX ADMIN — SEVELAMER CARBONATE 800 MG: 800 TABLET, FILM COATED ORAL at 01:10

## 2021-10-22 RX ADMIN — GABAPENTIN 100 MG: 100 CAPSULE ORAL at 08:10

## 2021-10-22 RX ADMIN — METHOCARBAMOL 750 MG: 750 TABLET ORAL at 01:10

## 2021-10-22 RX ADMIN — METHOCARBAMOL 750 MG: 750 TABLET ORAL at 09:10

## 2021-10-23 PROCEDURE — 25000003 PHARM REV CODE 250: Performed by: INTERNAL MEDICINE

## 2021-10-23 PROCEDURE — 63600175 PHARM REV CODE 636 W HCPCS: Performed by: HOSPITALIST

## 2021-10-23 PROCEDURE — 11000004 HC SNF PRIVATE

## 2021-10-23 PROCEDURE — 25000003 PHARM REV CODE 250: Performed by: NURSE PRACTITIONER

## 2021-10-23 RX ADMIN — GABAPENTIN 100 MG: 100 CAPSULE ORAL at 09:10

## 2021-10-23 RX ADMIN — GABAPENTIN 100 MG: 100 CAPSULE ORAL at 10:10

## 2021-10-23 RX ADMIN — Medication: at 10:10

## 2021-10-23 RX ADMIN — ZONISAMIDE 100 MG: 100 CAPSULE ORAL at 10:10

## 2021-10-23 RX ADMIN — METHOCARBAMOL 750 MG: 750 TABLET ORAL at 05:10

## 2021-10-23 RX ADMIN — ENOXAPARIN SODIUM 30 MG: 30 INJECTION SUBCUTANEOUS at 05:10

## 2021-10-23 RX ADMIN — ACETAMINOPHEN 1000 MG: 500 TABLET ORAL at 05:10

## 2021-10-23 RX ADMIN — METHOCARBAMOL 750 MG: 750 TABLET ORAL at 02:10

## 2021-10-23 RX ADMIN — FERROUS SULFATE TAB EC 325 MG (65 MG FE EQUIVALENT) 1 EACH: 325 (65 FE) TABLET DELAYED RESPONSE at 10:10

## 2021-10-23 RX ADMIN — SEVELAMER CARBONATE 800 MG: 800 TABLET, FILM COATED ORAL at 05:10

## 2021-10-23 RX ADMIN — PHENOBARBITAL 32.4 MG: 32.4 TABLET ORAL at 10:10

## 2021-10-23 RX ADMIN — METHOCARBAMOL 750 MG: 750 TABLET ORAL at 09:10

## 2021-10-23 RX ADMIN — Medication 250 MG: at 10:10

## 2021-10-23 RX ADMIN — ZONISAMIDE 100 MG: 100 CAPSULE ORAL at 09:10

## 2021-10-23 RX ADMIN — SEVELAMER CARBONATE 800 MG: 800 TABLET, FILM COATED ORAL at 06:10

## 2021-10-23 RX ADMIN — METHOCARBAMOL 750 MG: 750 TABLET ORAL at 10:10

## 2021-10-23 RX ADMIN — Medication: at 09:10

## 2021-10-23 RX ADMIN — ACETAMINOPHEN 1000 MG: 500 TABLET ORAL at 02:10

## 2021-10-23 RX ADMIN — Medication 2000 UNITS: at 10:10

## 2021-10-24 PROCEDURE — 11000004 HC SNF PRIVATE

## 2021-10-24 PROCEDURE — 97116 GAIT TRAINING THERAPY: CPT | Mod: CQ

## 2021-10-24 PROCEDURE — 97530 THERAPEUTIC ACTIVITIES: CPT | Mod: CQ

## 2021-10-24 PROCEDURE — 97110 THERAPEUTIC EXERCISES: CPT | Mod: CQ

## 2021-10-24 PROCEDURE — 25000003 PHARM REV CODE 250: Performed by: INTERNAL MEDICINE

## 2021-10-24 PROCEDURE — 63600175 PHARM REV CODE 636 W HCPCS: Performed by: HOSPITALIST

## 2021-10-24 PROCEDURE — 25000003 PHARM REV CODE 250: Performed by: NURSE PRACTITIONER

## 2021-10-24 PROCEDURE — 97535 SELF CARE MNGMENT TRAINING: CPT | Mod: CO

## 2021-10-24 RX ADMIN — ENOXAPARIN SODIUM 30 MG: 30 INJECTION SUBCUTANEOUS at 05:10

## 2021-10-24 RX ADMIN — ACETAMINOPHEN 1000 MG: 500 TABLET ORAL at 05:10

## 2021-10-24 RX ADMIN — SEVELAMER CARBONATE 800 MG: 800 TABLET, FILM COATED ORAL at 05:10

## 2021-10-24 RX ADMIN — Medication: at 08:10

## 2021-10-24 RX ADMIN — PHENOBARBITAL 32.4 MG: 32.4 TABLET ORAL at 08:10

## 2021-10-24 RX ADMIN — SEVELAMER CARBONATE 800 MG: 800 TABLET, FILM COATED ORAL at 08:10

## 2021-10-24 RX ADMIN — METHOCARBAMOL 750 MG: 750 TABLET ORAL at 08:10

## 2021-10-24 RX ADMIN — Medication 2000 UNITS: at 08:10

## 2021-10-24 RX ADMIN — Medication: at 09:10

## 2021-10-24 RX ADMIN — METHOCARBAMOL 750 MG: 750 TABLET ORAL at 05:10

## 2021-10-24 RX ADMIN — FERROUS SULFATE TAB EC 325 MG (65 MG FE EQUIVALENT) 1 EACH: 325 (65 FE) TABLET DELAYED RESPONSE at 08:10

## 2021-10-24 RX ADMIN — ACETAMINOPHEN 500 MG: 500 TABLET ORAL at 02:10

## 2021-10-24 RX ADMIN — ZONISAMIDE 100 MG: 100 CAPSULE ORAL at 09:10

## 2021-10-24 RX ADMIN — Medication 250 MG: at 08:10

## 2021-10-24 RX ADMIN — GABAPENTIN 100 MG: 100 CAPSULE ORAL at 08:10

## 2021-10-24 RX ADMIN — ZONISAMIDE 100 MG: 100 CAPSULE ORAL at 08:10

## 2021-10-24 RX ADMIN — SEVELAMER CARBONATE 800 MG: 800 TABLET, FILM COATED ORAL at 12:10

## 2021-10-25 PROCEDURE — 11000004 HC SNF PRIVATE

## 2021-10-25 PROCEDURE — 25000003 PHARM REV CODE 250: Performed by: NURSE PRACTITIONER

## 2021-10-25 PROCEDURE — 63600175 PHARM REV CODE 636 W HCPCS: Performed by: HOSPITALIST

## 2021-10-25 PROCEDURE — 97530 THERAPEUTIC ACTIVITIES: CPT | Mod: CO

## 2021-10-25 PROCEDURE — 97116 GAIT TRAINING THERAPY: CPT

## 2021-10-25 PROCEDURE — 25000003 PHARM REV CODE 250: Performed by: INTERNAL MEDICINE

## 2021-10-25 PROCEDURE — 97110 THERAPEUTIC EXERCISES: CPT

## 2021-10-25 RX ORDER — ASCORBIC ACID 250 MG
250 TABLET ORAL DAILY
COMMUNITY
Start: 2021-10-26

## 2021-10-25 RX ORDER — AMOXICILLIN 250 MG
1 CAPSULE ORAL DAILY PRN
Status: DISCONTINUED | OUTPATIENT
Start: 2021-10-25 | End: 2021-10-26 | Stop reason: HOSPADM

## 2021-10-25 RX ORDER — ACETAMINOPHEN 325 MG/1
650 TABLET ORAL EVERY 6 HOURS PRN
Status: DISCONTINUED | OUTPATIENT
Start: 2021-10-25 | End: 2021-10-26 | Stop reason: HOSPADM

## 2021-10-25 RX ORDER — POLYETHYLENE GLYCOL 3350 17 G/17G
17 POWDER, FOR SOLUTION ORAL 2 TIMES DAILY PRN
Status: DISCONTINUED | OUTPATIENT
Start: 2021-10-25 | End: 2021-10-26 | Stop reason: HOSPADM

## 2021-10-25 RX ORDER — CHOLECALCIFEROL (VITAMIN D3) 25 MCG
2000 TABLET ORAL DAILY
COMMUNITY
Start: 2021-10-26

## 2021-10-25 RX ORDER — LANOLIN ALCOHOL/MO/W.PET/CERES
1 CREAM (GRAM) TOPICAL DAILY
Qty: 30 TABLET | Refills: 3 | Status: SHIPPED | OUTPATIENT
Start: 2021-10-26

## 2021-10-25 RX ORDER — SEVELAMER CARBONATE 800 MG/1
800 TABLET, FILM COATED ORAL
Qty: 90 TABLET | Refills: 3 | Status: SHIPPED | OUTPATIENT
Start: 2021-10-26 | End: 2022-10-26

## 2021-10-25 RX ADMIN — SEVELAMER CARBONATE 800 MG: 800 TABLET, FILM COATED ORAL at 08:10

## 2021-10-25 RX ADMIN — FERROUS SULFATE TAB EC 325 MG (65 MG FE EQUIVALENT) 1 EACH: 325 (65 FE) TABLET DELAYED RESPONSE at 08:10

## 2021-10-25 RX ADMIN — Medication 2000 UNITS: at 08:10

## 2021-10-25 RX ADMIN — SEVELAMER CARBONATE 800 MG: 800 TABLET, FILM COATED ORAL at 04:10

## 2021-10-25 RX ADMIN — Medication 250 MG: at 08:10

## 2021-10-25 RX ADMIN — ZONISAMIDE 100 MG: 100 CAPSULE ORAL at 08:10

## 2021-10-25 RX ADMIN — SEVELAMER CARBONATE 800 MG: 800 TABLET, FILM COATED ORAL at 12:10

## 2021-10-25 RX ADMIN — GABAPENTIN 100 MG: 100 CAPSULE ORAL at 08:10

## 2021-10-25 RX ADMIN — ENOXAPARIN SODIUM 30 MG: 30 INJECTION SUBCUTANEOUS at 04:10

## 2021-10-25 RX ADMIN — Medication: at 09:10

## 2021-10-25 RX ADMIN — ZONISAMIDE 100 MG: 100 CAPSULE ORAL at 09:10

## 2021-10-25 RX ADMIN — PHENOBARBITAL 32.4 MG: 32.4 TABLET ORAL at 08:10

## 2021-10-25 RX ADMIN — GABAPENTIN 100 MG: 100 CAPSULE ORAL at 09:10

## 2021-10-26 VITALS
DIASTOLIC BLOOD PRESSURE: 84 MMHG | WEIGHT: 96.31 LBS | SYSTOLIC BLOOD PRESSURE: 126 MMHG | HEIGHT: 66 IN | BODY MASS INDEX: 15.48 KG/M2 | HEART RATE: 81 BPM | RESPIRATION RATE: 17 BRPM | TEMPERATURE: 98 F | OXYGEN SATURATION: 97 %

## 2021-10-26 LAB
ANION GAP SERPL CALC-SCNC: 8 MMOL/L (ref 8–16)
BASOPHILS # BLD AUTO: 0.05 K/UL (ref 0–0.2)
BASOPHILS NFR BLD: 0.8 % (ref 0–1.9)
BUN SERPL-MCNC: 41 MG/DL (ref 8–23)
CALCIUM SERPL-MCNC: 9.4 MG/DL (ref 8.7–10.5)
CHLORIDE SERPL-SCNC: 110 MMOL/L (ref 95–110)
CO2 SERPL-SCNC: 23 MMOL/L (ref 23–29)
CREAT SERPL-MCNC: 1.9 MG/DL (ref 0.5–1.4)
DIFFERENTIAL METHOD: ABNORMAL
EOSINOPHIL # BLD AUTO: 0.5 K/UL (ref 0–0.5)
EOSINOPHIL NFR BLD: 7.9 % (ref 0–8)
ERYTHROCYTE [DISTWIDTH] IN BLOOD BY AUTOMATED COUNT: 13.3 % (ref 11.5–14.5)
EST. GFR  (AFRICAN AMERICAN): 39.8 ML/MIN/1.73 M^2
EST. GFR  (NON AFRICAN AMERICAN): 34.5 ML/MIN/1.73 M^2
GLUCOSE SERPL-MCNC: 89 MG/DL (ref 70–110)
HCT VFR BLD AUTO: 24.8 % (ref 40–54)
HGB BLD-MCNC: 8 G/DL (ref 14–18)
IMM GRANULOCYTES # BLD AUTO: 0.02 K/UL (ref 0–0.04)
IMM GRANULOCYTES NFR BLD AUTO: 0.3 % (ref 0–0.5)
LYMPHOCYTES # BLD AUTO: 1.4 K/UL (ref 1–4.8)
LYMPHOCYTES NFR BLD: 22.2 % (ref 18–48)
MAGNESIUM SERPL-MCNC: 2.1 MG/DL (ref 1.6–2.6)
MCH RBC QN AUTO: 33.2 PG (ref 27–31)
MCHC RBC AUTO-ENTMCNC: 32.3 G/DL (ref 32–36)
MCV RBC AUTO: 103 FL (ref 82–98)
MONOCYTES # BLD AUTO: 0.6 K/UL (ref 0.3–1)
MONOCYTES NFR BLD: 9.2 % (ref 4–15)
NEUTROPHILS # BLD AUTO: 3.7 K/UL (ref 1.8–7.7)
NEUTROPHILS NFR BLD: 59.6 % (ref 38–73)
NRBC BLD-RTO: 0 /100 WBC
PHOSPHATE SERPL-MCNC: 4.2 MG/DL (ref 2.7–4.5)
PLATELET # BLD AUTO: 325 K/UL (ref 150–450)
PMV BLD AUTO: 12.5 FL (ref 9.2–12.9)
POTASSIUM SERPL-SCNC: 4.1 MMOL/L (ref 3.5–5.1)
RBC # BLD AUTO: 2.41 M/UL (ref 4.6–6.2)
SODIUM SERPL-SCNC: 141 MMOL/L (ref 136–145)
WBC # BLD AUTO: 6.17 K/UL (ref 3.9–12.7)

## 2021-10-26 PROCEDURE — 97530 THERAPEUTIC ACTIVITIES: CPT | Mod: CO

## 2021-10-26 PROCEDURE — 97116 GAIT TRAINING THERAPY: CPT

## 2021-10-26 PROCEDURE — 63600175 PHARM REV CODE 636 W HCPCS: Performed by: HOSPITALIST

## 2021-10-26 PROCEDURE — 25000003 PHARM REV CODE 250: Performed by: NURSE PRACTITIONER

## 2021-10-26 PROCEDURE — 97535 SELF CARE MNGMENT TRAINING: CPT | Mod: CO

## 2021-10-26 PROCEDURE — 25000003 PHARM REV CODE 250: Performed by: INTERNAL MEDICINE

## 2021-10-26 PROCEDURE — 80048 BASIC METABOLIC PNL TOTAL CA: CPT | Performed by: NURSE PRACTITIONER

## 2021-10-26 PROCEDURE — 85025 COMPLETE CBC W/AUTO DIFF WBC: CPT | Performed by: NURSE PRACTITIONER

## 2021-10-26 PROCEDURE — 36415 COLL VENOUS BLD VENIPUNCTURE: CPT | Performed by: NURSE PRACTITIONER

## 2021-10-26 PROCEDURE — 83735 ASSAY OF MAGNESIUM: CPT | Performed by: NURSE PRACTITIONER

## 2021-10-26 PROCEDURE — 84100 ASSAY OF PHOSPHORUS: CPT | Performed by: NURSE PRACTITIONER

## 2021-10-26 RX ADMIN — ENOXAPARIN SODIUM 30 MG: 30 INJECTION SUBCUTANEOUS at 04:10

## 2021-10-26 RX ADMIN — Medication: at 09:10

## 2021-10-26 RX ADMIN — GABAPENTIN 100 MG: 100 CAPSULE ORAL at 09:10

## 2021-10-26 RX ADMIN — ZONISAMIDE 100 MG: 100 CAPSULE ORAL at 09:10

## 2021-10-26 RX ADMIN — SEVELAMER CARBONATE 800 MG: 800 TABLET, FILM COATED ORAL at 07:10

## 2021-10-26 RX ADMIN — PHENOBARBITAL 32.4 MG: 32.4 TABLET ORAL at 09:10

## 2021-10-26 RX ADMIN — SEVELAMER CARBONATE 800 MG: 800 TABLET, FILM COATED ORAL at 12:10

## 2025-03-27 ENCOUNTER — HOSPITAL ENCOUNTER (INPATIENT)
Facility: HOSPITAL | Age: 75
LOS: 7 days | Discharge: NURSING, REHAB OR LONG-TERM CARE | DRG: 811 | End: 2025-04-04
Attending: EMERGENCY MEDICINE | Admitting: STUDENT IN AN ORGANIZED HEALTH CARE EDUCATION/TRAINING PROGRAM
Payer: MEDICARE

## 2025-03-27 DIAGNOSIS — R93.89 ABNORMAL CT SCAN: ICD-10-CM

## 2025-03-27 DIAGNOSIS — Z99.2 ESRD (END STAGE RENAL DISEASE) ON DIALYSIS: ICD-10-CM

## 2025-03-27 DIAGNOSIS — J96.21 ACUTE ON CHRONIC RESPIRATORY FAILURE WITH HYPOXEMIA: ICD-10-CM

## 2025-03-27 DIAGNOSIS — E87.70 HYPERVOLEMIA: ICD-10-CM

## 2025-03-27 DIAGNOSIS — D64.9 ANEMIA, UNSPECIFIED TYPE: ICD-10-CM

## 2025-03-27 DIAGNOSIS — R07.9 CHEST PAIN: ICD-10-CM

## 2025-03-27 DIAGNOSIS — R53.1 GENERALIZED WEAKNESS: Primary | ICD-10-CM

## 2025-03-27 DIAGNOSIS — G40.909 SEIZURE DISORDER: ICD-10-CM

## 2025-03-27 DIAGNOSIS — N18.6 ESRD (END STAGE RENAL DISEASE) ON DIALYSIS: ICD-10-CM

## 2025-03-27 DIAGNOSIS — Z99.2 DIALYSIS PATIENT: ICD-10-CM

## 2025-03-27 DIAGNOSIS — R05.9 COUGH: ICD-10-CM

## 2025-03-27 DIAGNOSIS — R94.31 QT PROLONGATION: ICD-10-CM

## 2025-03-27 DIAGNOSIS — D64.9 SYMPTOMATIC ANEMIA: ICD-10-CM

## 2025-03-27 PROBLEM — I48.91 ATRIAL FIBRILLATION: Status: ACTIVE | Noted: 2025-03-27

## 2025-03-27 PROBLEM — J90 PLEURAL EFFUSION: Status: ACTIVE | Noted: 2025-03-27

## 2025-03-27 LAB
ABO + RH BLD: NORMAL
ABORH RETYPE: NORMAL
ALBUMIN SERPL BCP-MCNC: 2 G/DL (ref 3.5–5.2)
ALP SERPL-CCNC: 45 UNIT/L (ref 40–150)
ALT SERPL W/O P-5'-P-CCNC: 11 UNIT/L (ref 10–44)
ANION GAP (OHS): 7 MMOL/L (ref 8–16)
AST SERPL-CCNC: 21 UNIT/L (ref 11–45)
BACTERIA #/AREA URNS AUTO: ABNORMAL /HPF
BILIRUB SERPL-MCNC: 0.2 MG/DL (ref 0.1–1)
BILIRUB UR QL STRIP.AUTO: NEGATIVE
BLD PROD TYP BPU: NORMAL
BLOOD UNIT EXPIRATION DATE: NORMAL
BLOOD UNIT TYPE CODE: 5100
BNP SERPL-MCNC: 3584 PG/ML (ref 0–99)
BUN SERPL-MCNC: 25 MG/DL (ref 8–23)
CALCIUM SERPL-MCNC: 8.2 MG/DL (ref 8.7–10.5)
CHLORIDE SERPL-SCNC: 102 MMOL/L (ref 95–110)
CLARITY UR: ABNORMAL
CO2 SERPL-SCNC: 29 MMOL/L (ref 23–29)
COLOR UR AUTO: ABNORMAL
CREAT SERPL-MCNC: 3 MG/DL (ref 0.5–1.4)
CROSSMATCH INTERPRETATION: NORMAL
DISPENSE STATUS: NORMAL
ERYTHROCYTE [DISTWIDTH] IN BLOOD BY AUTOMATED COUNT: 17.4 % (ref 11.5–14.5)
GFR SERPLBLD CREATININE-BSD FMLA CKD-EPI: 21 ML/MIN/1.73/M2
GLUCOSE SERPL-MCNC: 88 MG/DL (ref 70–110)
GLUCOSE UR QL STRIP: NEGATIVE
HCT VFR BLD AUTO: 21.7 % (ref 40–54)
HCV AB SERPL QL IA: NORMAL
HGB BLD-MCNC: 6.8 GM/DL (ref 14–18)
HGB UR QL STRIP: ABNORMAL
HIV 1+2 AB+HIV1 P24 AG SERPL QL IA: NORMAL
HYALINE CASTS UR QL AUTO: 0 /LPF (ref 0–1)
INDIRECT COOMBS: NORMAL
KETONES UR QL STRIP: NEGATIVE
LEUKOCYTE ESTERASE UR QL STRIP: ABNORMAL
MCH RBC QN AUTO: 32.1 PG (ref 27–50)
MCHC RBC AUTO-ENTMCNC: 31.3 G/DL (ref 32–36)
MCV RBC AUTO: 102 FL (ref 82–98)
MICROSCOPIC COMMENT: ABNORMAL
NITRITE UR QL STRIP: NEGATIVE
OHS QRS DURATION: 82 MS
OHS QTC CALCULATION: 503 MS
PH UR STRIP: 8 [PH]
PLATELET # BLD AUTO: 309 K/UL (ref 150–450)
PMV BLD AUTO: 11 FL (ref 9.2–12.9)
POTASSIUM SERPL-SCNC: 3.4 MMOL/L (ref 3.5–5.1)
PROT SERPL-MCNC: 6 GM/DL (ref 6–8.4)
PROT UR QL STRIP: ABNORMAL
RBC # BLD AUTO: 2.12 M/UL (ref 4.6–6.2)
RBC #/AREA URNS AUTO: 20 /HPF (ref 0–4)
RH BLD: NORMAL
SODIUM SERPL-SCNC: 138 MMOL/L (ref 136–145)
SP GR UR STRIP: 1.01
SPECIMEN OUTDATE: NORMAL
TROPONIN I SERPL HS-MCNC: 19 NG/L
TROPONIN I SERPL HS-MCNC: 20 NG/L
UNIT NUMBER: NORMAL
UROBILINOGEN UR STRIP-ACNC: NEGATIVE EU/DL
WBC # BLD AUTO: 9.95 K/UL (ref 3.9–12.7)
WBC #/AREA URNS AUTO: >100 /HPF (ref 0–5)
WBC CLUMPS UR QL AUTO: ABNORMAL
YEAST UR QL AUTO: ABNORMAL /HPF

## 2025-03-27 PROCEDURE — 36430 TRANSFUSION BLD/BLD COMPNT: CPT

## 2025-03-27 PROCEDURE — 82310 ASSAY OF CALCIUM: CPT | Performed by: EMERGENCY MEDICINE

## 2025-03-27 PROCEDURE — 86920 COMPATIBILITY TEST SPIN: CPT

## 2025-03-27 PROCEDURE — P9016 RBC LEUKOCYTES REDUCED: HCPCS

## 2025-03-27 PROCEDURE — 25000003 PHARM REV CODE 250: Performed by: PHYSICIAN ASSISTANT

## 2025-03-27 PROCEDURE — 93010 ELECTROCARDIOGRAM REPORT: CPT | Mod: ,,, | Performed by: INTERNAL MEDICINE

## 2025-03-27 PROCEDURE — 83880 ASSAY OF NATRIURETIC PEPTIDE: CPT

## 2025-03-27 PROCEDURE — G0378 HOSPITAL OBSERVATION PER HR: HCPCS

## 2025-03-27 PROCEDURE — 81001 URINALYSIS AUTO W/SCOPE: CPT

## 2025-03-27 PROCEDURE — 86901 BLOOD TYPING SEROLOGIC RH(D): CPT | Performed by: EMERGENCY MEDICINE

## 2025-03-27 PROCEDURE — 63600175 PHARM REV CODE 636 W HCPCS: Performed by: PHYSICIAN ASSISTANT

## 2025-03-27 PROCEDURE — 84484 ASSAY OF TROPONIN QUANT: CPT | Performed by: EMERGENCY MEDICINE

## 2025-03-27 PROCEDURE — 86803 HEPATITIS C AB TEST: CPT | Performed by: PHYSICIAN ASSISTANT

## 2025-03-27 PROCEDURE — 85027 COMPLETE CBC AUTOMATED: CPT | Performed by: EMERGENCY MEDICINE

## 2025-03-27 PROCEDURE — 93005 ELECTROCARDIOGRAM TRACING: CPT

## 2025-03-27 PROCEDURE — 84484 ASSAY OF TROPONIN QUANT: CPT | Mod: 91

## 2025-03-27 PROCEDURE — 99285 EMERGENCY DEPT VISIT HI MDM: CPT | Mod: 25

## 2025-03-27 PROCEDURE — 87389 HIV-1 AG W/HIV-1&-2 AB AG IA: CPT | Performed by: PHYSICIAN ASSISTANT

## 2025-03-27 PROCEDURE — 30233N1 TRANSFUSION OF NONAUTOLOGOUS RED BLOOD CELLS INTO PERIPHERAL VEIN, PERCUTANEOUS APPROACH: ICD-10-PCS | Performed by: EMERGENCY MEDICINE

## 2025-03-27 PROCEDURE — 87086 URINE CULTURE/COLONY COUNT: CPT

## 2025-03-27 RX ORDER — LANOLIN ALCOHOL/MO/W.PET/CERES
1 CREAM (GRAM) TOPICAL DAILY
Status: DISCONTINUED | OUTPATIENT
Start: 2025-03-28 | End: 2025-04-04 | Stop reason: HOSPADM

## 2025-03-27 RX ORDER — POLYETHYLENE GLYCOL 3350 17 G/17G
17 POWDER, FOR SOLUTION ORAL DAILY
Status: DISCONTINUED | OUTPATIENT
Start: 2025-03-27 | End: 2025-04-04 | Stop reason: HOSPADM

## 2025-03-27 RX ORDER — HYDROCODONE BITARTRATE AND ACETAMINOPHEN 500; 5 MG/1; MG/1
TABLET ORAL
Status: DISCONTINUED | OUTPATIENT
Start: 2025-03-27 | End: 2025-04-04 | Stop reason: HOSPADM

## 2025-03-27 RX ORDER — ZINC SULFATE 50(220)MG
1 CAPSULE ORAL DAILY
Status: ON HOLD | COMMUNITY
Start: 2025-03-08 | End: 2025-04-04 | Stop reason: HOSPADM

## 2025-03-27 RX ORDER — IBUPROFEN 200 MG
16 TABLET ORAL
Status: DISCONTINUED | OUTPATIENT
Start: 2025-03-27 | End: 2025-04-04 | Stop reason: HOSPADM

## 2025-03-27 RX ORDER — AMIODARONE HYDROCHLORIDE 200 MG/1
200 TABLET ORAL DAILY
COMMUNITY

## 2025-03-27 RX ORDER — PANTOPRAZOLE SODIUM 40 MG/10ML
40 INJECTION, POWDER, LYOPHILIZED, FOR SOLUTION INTRAVENOUS 2 TIMES DAILY
Status: DISCONTINUED | OUTPATIENT
Start: 2025-03-27 | End: 2025-04-01

## 2025-03-27 RX ORDER — IBUPROFEN 200 MG
24 TABLET ORAL
Status: DISCONTINUED | OUTPATIENT
Start: 2025-03-27 | End: 2025-04-04 | Stop reason: HOSPADM

## 2025-03-27 RX ORDER — ASCORBIC ACID 250 MG
250 TABLET ORAL DAILY
Status: DISCONTINUED | OUTPATIENT
Start: 2025-03-28 | End: 2025-04-04 | Stop reason: HOSPADM

## 2025-03-27 RX ORDER — POLYETHYLENE GLYCOL 3350 17 G/17G
17 POWDER, FOR SOLUTION ORAL DAILY PRN
Status: DISCONTINUED | OUTPATIENT
Start: 2025-03-27 | End: 2025-03-27

## 2025-03-27 RX ORDER — FUROSEMIDE 10 MG/ML
40 INJECTION INTRAMUSCULAR; INTRAVENOUS ONCE
Status: COMPLETED | OUTPATIENT
Start: 2025-03-27 | End: 2025-03-27

## 2025-03-27 RX ORDER — CHOLECALCIFEROL (VITAMIN D3) 25 MCG
2000 TABLET ORAL DAILY
Status: DISCONTINUED | OUTPATIENT
Start: 2025-03-28 | End: 2025-04-04 | Stop reason: HOSPADM

## 2025-03-27 RX ORDER — ACETAMINOPHEN 325 MG/1
650 TABLET ORAL EVERY 8 HOURS PRN
Status: DISCONTINUED | OUTPATIENT
Start: 2025-03-27 | End: 2025-04-04 | Stop reason: HOSPADM

## 2025-03-27 RX ORDER — AMIODARONE HYDROCHLORIDE 200 MG/1
200 TABLET ORAL DAILY
Status: DISCONTINUED | OUTPATIENT
Start: 2025-03-28 | End: 2025-04-04 | Stop reason: HOSPADM

## 2025-03-27 RX ORDER — ONDANSETRON 8 MG/1
8 TABLET, ORALLY DISINTEGRATING ORAL EVERY 8 HOURS PRN
Status: DISCONTINUED | OUTPATIENT
Start: 2025-03-27 | End: 2025-04-04 | Stop reason: HOSPADM

## 2025-03-27 RX ORDER — METOPROLOL SUCCINATE 25 MG/1
25 TABLET, EXTENDED RELEASE ORAL DAILY
COMMUNITY

## 2025-03-27 RX ORDER — SENNOSIDES 8.6 MG/1
8.6 TABLET ORAL DAILY
Status: DISCONTINUED | OUTPATIENT
Start: 2025-03-27 | End: 2025-04-04 | Stop reason: HOSPADM

## 2025-03-27 RX ORDER — NALOXONE HCL 0.4 MG/ML
0.02 VIAL (ML) INJECTION
Status: DISCONTINUED | OUTPATIENT
Start: 2025-03-27 | End: 2025-04-04 | Stop reason: HOSPADM

## 2025-03-27 RX ORDER — GLUCAGON 1 MG
1 KIT INJECTION
Status: DISCONTINUED | OUTPATIENT
Start: 2025-03-27 | End: 2025-04-04 | Stop reason: HOSPADM

## 2025-03-27 RX ORDER — ARIPIPRAZOLE 2 MG/1
2 TABLET ORAL DAILY
COMMUNITY

## 2025-03-27 RX ORDER — ARIPIPRAZOLE 2 MG/1
2 TABLET ORAL DAILY
Status: DISCONTINUED | OUTPATIENT
Start: 2025-03-28 | End: 2025-04-04 | Stop reason: HOSPADM

## 2025-03-27 RX ORDER — TALC
6 POWDER (GRAM) TOPICAL NIGHTLY PRN
Status: DISCONTINUED | OUTPATIENT
Start: 2025-03-27 | End: 2025-04-04 | Stop reason: HOSPADM

## 2025-03-27 RX ORDER — ZONISAMIDE 100 MG/1
100 CAPSULE ORAL DAILY
Status: DISCONTINUED | OUTPATIENT
Start: 2025-03-28 | End: 2025-04-04 | Stop reason: HOSPADM

## 2025-03-27 RX ORDER — SEVELAMER CARBONATE 800 MG/1
800 TABLET, FILM COATED ORAL
Status: DISCONTINUED | OUTPATIENT
Start: 2025-03-27 | End: 2025-04-04 | Stop reason: HOSPADM

## 2025-03-27 RX ORDER — PROCHLORPERAZINE EDISYLATE 5 MG/ML
5 INJECTION INTRAMUSCULAR; INTRAVENOUS EVERY 6 HOURS PRN
Status: DISCONTINUED | OUTPATIENT
Start: 2025-03-27 | End: 2025-04-04 | Stop reason: HOSPADM

## 2025-03-27 RX ORDER — PHENOBARBITAL 32.4 MG/1
32 TABLET ORAL DAILY
Status: DISCONTINUED | OUTPATIENT
Start: 2025-03-28 | End: 2025-04-04 | Stop reason: HOSPADM

## 2025-03-27 RX ORDER — METOPROLOL SUCCINATE 25 MG/1
25 TABLET, EXTENDED RELEASE ORAL DAILY
Status: DISCONTINUED | OUTPATIENT
Start: 2025-03-28 | End: 2025-04-04 | Stop reason: HOSPADM

## 2025-03-27 RX ADMIN — SENNOSIDES 8.6 MG: 8.6 TABLET, FILM COATED ORAL at 05:03

## 2025-03-27 RX ADMIN — PANTOPRAZOLE SODIUM 40 MG: 40 INJECTION, POWDER, FOR SOLUTION INTRAVENOUS at 05:03

## 2025-03-27 RX ADMIN — FUROSEMIDE 40 MG: 10 INJECTION, SOLUTION INTRAVENOUS at 08:03

## 2025-03-27 RX ADMIN — SEVELAMER CARBONATE 800 MG: 800 TABLET, FILM COATED ORAL at 05:03

## 2025-03-27 NOTE — ED PROVIDER NOTES
Encounter Date: 3/27/2025       History     Chief Complaint   Patient presents with    Abnormal Lab     Arrives EMS from [facility] for low H&H (6.6) with routine labs yesterdays. Denies complaints.     74 year old male with a pMHx of HFrEF (EF 35%), hypertension, peripheral arterial disease, chronic kidney disease on hemodialysis (MWF), seizure disorder who presents to the emergency department after routine labs completed yesterday revealed a hemoglobin of 6.6. He is currently residing at a SNF after being discharged from the hospital on 03/07. Patient reports he has been having persistent constipation for several weeks. He has not noticed any blood in his stools, however, he reports that a nurse changes his briefs for him so he is unsure. He has been able to get up with the help of PT but is mostly confined to his bed due to generalized weakness that has been ongoing since he was discharged from the hospital. He also endorses nasal congestion and cough with sputum production for several weeks. He is on 1-2L of O2 at baseline. Patient was admitted from 2/21-03/07 after a syncopal event. He was found to have several issues going on, including acute blood loss anemia requiring 2 units PRBCs. At this time, he also had an ANGEL on CKD along with rhabdomyolysis, so a tunneled catheter was placed and he was started on dialysis. He has been going to dialysis regularly. He denies headache, fever, chills, hemoptysis, decreased appetite, chest pain, abdominal pain, nausea, vomiting, dysuria, hematuria, or lower extremity pain/swelling.     The history is provided by the patient.     Review of patient's allergies indicates:  No Known Allergies  Past Medical History:   Diagnosis Date    Arthritis     Epilepsy     Hyperglycemia     Stroke      Past Surgical History:   Procedure Laterality Date    CHOLECYSTECTOMY      JOINT REPLACEMENT      SKIN CANCER EXCISION       Family History   Problem Relation Name Age of Onset     Diabetes Mother       Social History[1]  Review of Systems    Physical Exam     Initial Vitals [03/27/25 1302]   BP Pulse Resp Temp SpO2   (!) 155/83 84 18 98.1 °F (36.7 °C) 98 %      MAP       --         Physical Exam    Nursing note and vitals reviewed.  Constitutional: He is not diaphoretic. No distress.   Catechetic appearing male lying in bed.   Eyes: Conjunctivae and EOM are normal.   Neck:   Normal range of motion.  Cardiovascular:  Normal rate and regular rhythm.           No murmur heard.  Pulmonary/Chest: Breath sounds normal. No respiratory distress. He has no wheezes.   Abdominal: Abdomen is soft. He exhibits no distension. There is no abdominal tenderness. There is no rebound and no guarding.   Genitourinary: Rectum:      Guaiac result positive.   Guaiac positive stool. : Acceptable.   Genitourinary Comments: Stool in rectal vault.     Musculoskeletal:      Cervical back: Normal range of motion.      Comments: Left toe wound present. No lower extremity swelling or tenderness to palpation of calves.     Neurological: He is alert and oriented to person, place, and time. GCS score is 15. GCS eye subscore is 4. GCS verbal subscore is 5. GCS motor subscore is 6.   Skin: Skin is warm and dry.   Psychiatric: He has a normal mood and affect.         ED Course   Procedures  Labs Reviewed   CBC WITHOUT DIFFERENTIAL - Abnormal       Result Value    WBC 9.95      RBC 2.12 (*)     HGB 6.8 (*)     HCT 21.7 (*)      (*)     MCHC 31.3 (*)     RDW 17.4 (*)     Platelet Count 309      MCH 32.1      MPV 11.0     COMPREHENSIVE METABOLIC PANEL - Abnormal    Sodium 138      Potassium 3.4 (*)     Chloride 102      CO2 29      Glucose 88      BUN 25 (*)     Creatinine 3.0 (*)     Calcium 8.2 (*)     Protein Total 6.0      Albumin 2.0 (*)     Bilirubin Total 0.2      ALP 45      AST 21      ALT 11      Anion Gap 7 (*)     eGFR 21 (*)    B-TYPE NATRIURETIC PEPTIDE - Abnormal    BNP 3,584 (*)    HEPATITIS  C ANTIBODY - Normal    Hep C Ab Interp Non-Reactive     HIV 1 / 2 ANTIBODY - Normal    HIV 1/2 Ag/Ab Non-Reactive     TROPONIN I HIGH SENSITIVITY - Normal    Troponin High Sensitive 19     TROPONIN I HIGH SENSITIVITY - Normal    Troponin High Sensitive 20     URINALYSIS, REFLEX TO URINE CULTURE   CBC W/ AUTO DIFFERENTIAL    Narrative:     The following orders were created for panel order CBC auto differential.  Procedure                               Abnormality         Status                     ---------                               -----------         ------                     CBC with Differential[[id number]]                                                        Please view results for these tests on the individual orders.   CBC WITH DIFFERENTIAL   TYPE & SCREEN    Specimen Outdate 03/30/2025 23:59      Group & Rh O POS      Indirect Adan NEG     ABORH RETYPE   PREPARE RBC SOFT        ECG Results              EKG 12-lead (Final result)        Collection Time Result Time QRS Duration OHS QTC Calculation    03/27/25 15:13:37 03/27/25 15:22:26 82 503                     Final result by Interface, Lab In [facility] (03/27/25 15:22:33)                   Narrative:    Test Reason : R53.1,    Vent. Rate :  62 BPM     Atrial Rate :  62 BPM     P-R Int : 170 ms          QRS Dur :  82 ms      QT Int : 496 ms       P-R-T Axes :  72 -89 -75 degrees    QTcB Int : 503 ms    Normal sinus rhythm  Left axis deviation  ST and T wave abnormality, consider inferior ischemia  ST and T wave abnormality, consider anterolateral ischemia  Abnormal ECG  When compared with ECG of 08-Oct-2021 19:41,  The axis Shifted left  T wave inversion now evident in Inferior leads  T wave inversion now evident in Anterior-lateral leads  QT has lengthened  Confirmed by García Faith ([id number]) on 3/27/2025 3:22:20 PM    Referred By: AAAREFERRAL SELF           Confirmed By: García Faith                                  Imaging Results               CT  Abdomen Pelvis  Without Contrast (Final result)  Result time 03/27/25 17:07:23      Final result by Az Villatoro MD (03/27/25 17:07:23)                   Impression:      This report was flagged in Epic as abnormal.    1. Large amount of stool within the rectum noting rectal wall thickening.  Stercoral proctitis or other infectious or inflammatory proctitis are considerations.  Correlation with any history of constipation or impaction.  2. Colonic diverticulosis without convincing findings to suggest diverticulitis.  3. Moderate bilateral pleural effusions with associated pericardial effusion suggest volume overload.  There is pulmonary edema.  4. Bilateral nonobstructive nephrolithiasis/renal vascular calcification.  5. Please see above for several additional findings.      Electronically signed by: Az Villatoro MD  Date:    03/27/2025  Time:    17:07               Narrative:    EXAMINATION:  CT ABDOMEN PELVIS WITHOUT CONTRAST    CLINICAL HISTORY:  GI bleed;Unable to get CTA due to ANGEL;    TECHNIQUE:  Low dose axial images, sagittal and coronal reformations were obtained from the lung bases to the pubic symphysis.  Oral contrast was not administered.    COMPARISON:  Radiograph 11/03/2010    FINDINGS:  Images of the lower thorax are remarkable for moderate bilateral pleural effusions with associated compressive atelectasis of the bilateral lower lobes.  Scattered interlobular septal thickening suggests superimposed edema.  The heart is not enlarged noting mild to moderate pericardial effusion.    Evaluation of the abdomen and pelvis is somewhat limited secondary to motion artifact and artifact from patient's arms within the gantry.  Allowing for the above, the liver, spleen, pancreas and adrenal glands have a grossly unremarkable noncontrast appearance.  The gallbladder is surgically absent.  The common duct is prominent status post cholecystectomy.  The stomach is decompressed without wall thickening.  No  significant abdominal lymphadenopathy.    There is bilateral nonobstructive nephrolithiasis/renal vascular calcification.  No hydronephrosis.  A cyst arises from the posterior aspect of the interpolar region of the left kidney measuring 1.3 cm.  An additional cyst arises from the lower pole of the right kidney measuring 2 cm.  The bilateral ureters are unable to be followed in their entirety to the urinary bladder, no definite calculi seen.  No secondary findings to suggest obstructive uropathy.  The urinary bladder is mildly distended, there may be residual wall thickening.  Evaluation of the lower pelvis is significantly limited secondary to extensive artifact from left hip arthroplasty.  The prostate does not appear to be significantly enlarged.    There is a large amount of stool in the rectum noting rectal wall thickening, findings may reflect stercoral proctitis.  Correlation with any history of constipation or impaction.  There are few scattered colonic diverticula without convincing inflammation to suggest diverticulitis.  The terminal ileum is grossly unremarkable.  The appendix is not confidently identified, no pericecal inflammation.  The small bowel is grossly unremarkable.  There are a few scattered shotty periaortic, pericaval, and mesenteric lymph nodes.  There is surgical change within the abdomen, may reflect prior small bowel resection.    There is osteopenia.  There are degenerative changes of the spine.  Left hip arthroplasty appears intact.  No significant inguinal lymphadenopathy.                                       X-Ray Chest AP Portable (Final result)  Result time 03/27/25 16:04:47      Final result by Abdirashid Trinidad MD (03/27/25 16:04:47)                   Impression:      Findings suggestive of pulmonary edema with bilateral pleural effusions, left greater than right      Electronically signed by: Abdirashid Trinidad MD  Date:    03/27/2025  Time:    16:04               Narrative:     EXAMINATION:  XR CHEST AP PORTABLE    CLINICAL HISTORY:  Cough, unspecified    TECHNIQUE:  Single views of the chest were performed.    COMPARISON:  Chest radiograph dated October 8, 2021    FINDINGS:  Right IJ approach tunneled hemodialysis catheter is noted, tip projecting over the distal SVC.  The trachea and cardiomediastinal silhouette remains stable.  There is increased perihilar, interstitial and bibasilar opacities suggestive of pulmonary edema with bilateral pleural effusions (left greater than right).  No evidence for large pneumothorax.  Osseous structures demonstrate no evidence for acute fractures or dislocations.                                       Medications   0.9%  NaCl infusion (for blood administration) (has no administration in time range)   ascorbic acid (vitamin C) tablet 250 mg (has no administration in time range)   ferrous sulfate tablet 1 each (has no administration in time range)   PHENobarbitaL tablet 32.4 mg (has no administration in time range)   sevelamer carbonate tablet 800 mg (800 mg Oral Given 3/27/25 1749)   vitamin D 1000 units tablet 2,000 Units (has no administration in time range)   zonisamide capsule 100 mg (has no administration in time range)   melatonin tablet 6 mg (has no administration in time range)   ondansetron disintegrating tablet 8 mg (has no administration in time range)   prochlorperazine injection Soln 5 mg (has no administration in time range)   acetaminophen tablet 650 mg (has no administration in time range)   naloxone 0.4 mg/mL injection 0.02 mg (has no administration in time range)   glucose chewable tablet 16 g (has no administration in time range)   glucose chewable tablet 24 g (has no administration in time range)   dextrose 50% injection 12.5 g (has no administration in time range)   dextrose 50% injection 25 g (has no administration in time range)   glucagon (human recombinant) injection 1 mg (has no administration in time range)   pantoprazole  injection 40 mg (40 mg Intravenous Given 3/27/25 1750)   ARIPiprazole tablet 2 mg (has no administration in time range)   amiodarone tablet 200 mg (has no administration in time range)   metoprolol succinate (TOPROL-XL) 24 hr tablet 25 mg (has no administration in time range)   furosemide injection 40 mg (has no administration in time range)   polyethylene glycol packet 17 g (17 g Oral Not Given 3/27/25 1800)   senna tablet 8.6 mg (8.6 mg Oral Given 3/27/25 1749)     Medical Decision Making  Patient is a 74 year-old male who presents to the emergency department with complaints of generalized weakness and low hemoglobin on routine labs. Patient is non-toxic appearing, afebrile, and hemodynamically stable. On 2L NC.    Differential diagnosis includes but is not limited to acute blood loss anemia, ANGEL, worsening CKD, dehydration, electrolyte derangement, GI bleed, pleural effusion, anemia of chronic kidney disease, pneumonia    Labs with H/H 6.8/21.7, given 1 unit PRBCs. No leukocytosis, worsening renal function (Cr 3.0, GFR 21).  CXR with bilateral pleural effusions and pulmonary edema. BNP elevated.   EKG with new T wave inversions, troponin trended, remains stable.  CT abdomen/pelvis without any acute GI bleed seen, colonic diverticulosis and proctitis noted    See ED course.    Disposition   Patient will be placed in observation for further management. Discussed patient presentation with hospital medicine who is agreeable to plan. Patient reassessed, discussed dispo, given opportunity to ask additional questions prior to disposition.    Amount and/or Complexity of Data Reviewed  External Data Reviewed: notes.     Details:      Labs: ordered. Decision-making details documented in ED Course.  Radiology: ordered. Decision-making details documented in ED Course.  ECG/medicine tests: ordered and independent interpretation performed.    Risk  Prescription drug management.  Decision regarding hospitalization.               Attending Attestation:     Physician Attestation Statement for NP/PA:   I personally made/approved the management plan and take responsibility for the patient management.    Other NP/PA Attestation Additions:    History of Present Illness: 75 yo male transferred for evaluation from [facility] for low hemoglobin.  Unsure if he has had any blood in his stools.  Denies abdominal pain. Recent admission for syncope, tx for CKD, GI bleed.    Physical Exam: Awake, alert  Nasal canula in place  RRR  Abdomen soft, non-tender  Left heel wound  Right chest wall cathter    Medical Decision Making: EKG on my independent interpretation:  Rate 62, no STEMI, ST-TWA  Compared to 10/8/21 EKG.     HS trop neg. Confirmed anemia 6.8.  Consented and ordered for blood transfusion.  Stool heme occult pos.    Agree with admission to Hospital Medicine to trend HGB, ordered for PPI, consider GI consultation.   Ct A/P reviewed - consider CRS consultation for proctitis.     Attending Critical Care:   Critical Care Times:   Direct Patient Care (initial evaluation, reassessments, and time considering the case)................................................................13 minutes.   Additional History from reviewing old medical records or taking additional history from the family, EMS, PCP, etc.......................6 minutes.   Ordering, Reviewing, and Interpreting Diagnostic Studies...............................................................................................................5 minutes.   Documentation..................................................................................................................................................................................4 minutes.   Consultation with other Physicians. .................................................................................................................................................3 minutes.    ==============================================================  Total Critical Care Time - exclusive of procedural time: 31 minutes.  ==============================================================  Critical care reasons: Anemia requiring blood transfusion.   Critical care was time spent personally by me on the following activities: examination of patient, obtaining history from patient or relative, ordering lab, x-rays, and/or EKG, evaluation of patient's response to treatment, discussion with consultants, ordering and performing treatments and interventions and re-evaluation of patient's conition.   Critical Care Condition: potentially life-threatening           ED Course as of 03/27/25 1824   Thu Mar 27, 2025   1557 Hemoglobin(!): 6.8  Anemia, 8.3 two weeks ago  [AB]   1557 Creatinine(!): 3.0  Prior 2.8 [AB]   1557 WBC: 9.95  No leukocytosis [AB]   1609 X-Ray Chest AP Portable  pulmonary edema with bilateral pleural effusions [NK]   1734 CT Abdomen Pelvis  Without Contrast(!)  1. Large amount of stool within the rectum noting rectal wall thickening.  Stercoral proctitis or other infectious or inflammatory proctitis are considerations.  Correlation with any history of constipation or impaction.  2. Colonic diverticulosis without convincing findings to suggest diverticulitis.  3. Moderate bilateral pleural effusions with associated pericardial effusion suggest volume overload.  There is pulmonary edema. [NK]   1813 SpO2(!): 72 %  Patient not wearing NC. Put back on 2L NC and SPO2 96%. [NK]   1814 BNP(!): 3,584 [NK]   1814 Troponin I High Sensitivity  No significant change seen on trend [NK]      ED Course User Index  [AB] Armani Ulloa MD  [NK] Nasir Tapia PA-C                           Clinical Impression:  Final diagnoses:  [R53.1] Generalized weakness (Primary)  [R05.9] Cough  [D64.9] Anemia, unspecified type          ED Disposition Condition    Observation                   Nasir Tapia PA-C  03/27/25  1824         [1]   Social History  Tobacco Use    Smoking status: Every Day     Current packs/day: 1.00     Average packs/day: 1 pack/day for 45.0 years (45.0 ttl pk-yrs)     Types: Cigarettes    Smokeless tobacco: Never   Substance Use Topics    Alcohol use: No    Drug use: No        Armani Ulloa MD  03/28/25 0904

## 2025-03-27 NOTE — H&P
[facility]  History & Physical    Patient Name: Zelalem Gamez  MRN: [id number]  Patient Class: OP- Observation  Admission Date: 3/27/2025  Attending Physician: Daisy Sandoval MD   Primary Care Provider: Deysi Pink MD         Patient information was obtained from patient, past medical records, and ER records.     Subjective:     Principal Problem:Symptomatic anemia    Chief Complaint:   Chief Complaint   Patient presents with    Abnormal Lab     Arrives EMS from [facility] for low H&H (6.6) with routine labs yesterdays. Denies complaints.        HPI: Mr. Zelalem Gamez arpit 74 year old male with a pMHx of FrEF (EF 35%), hypertension, peripheral arterial disease, chronic kidney disease on hemodialysis (MWF), seizure disorder who presentsafter routine labs revealed hemoglobin of 6.6. He is currently residing at a SNF after being discharged from the hospital on 03/07. Patient reports he has been having persistent constipation for several weeks. He has not noticed any blood in his stools, however, he reports that a nurse changes his briefs for him. He has been able to get out of bed with the help of PT but is mostly confined to his bed due to generalized weakness that has been ongoing since he was discharged from the hospital. He also endorses nasal congestion and cough with sputum production for several weeks. He is on 1-2L of O2 at baseline. Patient was admitted from 2/21-03/07 after a syncopal event. He was found to have several issues going on, including acute blood loss anemia requiring 2 units PRBCs. At this time, he also had an ANGEL on CKD along with rhabdomyolysis, so a tunneled catheter was placed and he was started on dialysis. He has been going to dialysis regularly. He denies headache, fever, chills, hemoptysis, decreased appetite, chest pain, abdominal pain, nausea, vomiting, dysuria, hematuria, or lower extremity pain/swelling.      In ED, Pt AFVSS  on 1L NC. Hgb 6.6>6.8. No leukocytosis. K 3.4. Cr 3, previously 2.8. Trop 19. CXR: Findings suggestive of pulmonary edema with bilateral pleural effusions, left greater than right. Admitted to  for c/f GIB.     Past Medical History:   Diagnosis Date    Arthritis     Epilepsy     Hyperglycemia     Stroke        Past Surgical History:   Procedure Laterality Date    CHOLECYSTECTOMY      JOINT REPLACEMENT      SKIN CANCER EXCISION         Review of patient's allergies indicates:  No Known Allergies    No current facility-administered medications on file prior to encounter.     Current Outpatient Medications on File Prior to Encounter   Medication Sig    zinc sulfate (ZINCATE) 50 mg zinc (220 mg) capsule Take 1 capsule by mouth once daily.    acetaminophen (TYLENOL) 500 MG tablet Take 1 tablet (500 mg total) by mouth every 6 (six) hours as needed for Pain.    amiodarone (PACERONE) 200 MG Tab Take 200 mg by mouth once daily.    ARIPiprazole (ABILIFY) 2 MG Tab Take 2 mg by mouth once daily.    ascorbic acid, vitamin C, (VITAMIN C) 250 MG tablet Take 1 tablet (250 mg total) by mouth once daily.    ferrous sulfate (FEOSOL) Tab tablet Take 1 tablet (1 each total) by mouth once daily.    gabapentin (NEURONTIN) 100 MG capsule Take 100 mg by mouth 2 (two) times daily.    metoprolol succinate (TOPROL-XL) 25 MG 24 hr tablet Take 25 mg by mouth once daily.    PHENobarbitaL (LUMINAL) 16.2 MG tablet Take 32 mg by mouth once daily.    sevelamer carbonate (RENVELA) 800 mg Tab Take 1 tablet (800 mg total) by mouth 3 (three) times daily with meals.    vitamin D (VITAMIN D3) 1000 units Tab Take 2 tablets (2,000 Units total) by mouth once daily.    zonisamide (ZONEGRAN) 100 MG Cap Take by mouth.     Family History       Problem Relation (Age of Onset)    Diabetes Mother          Tobacco Use    Smoking status: Every Day     Current packs/day: 1.00     Average packs/day: 1 pack/day for 45.0 years (45.0 ttl pk-yrs)     Types: Cigarettes     Smokeless tobacco: Never   Substance and Sexual Activity    Alcohol use: No    Drug use: No    Sexual activity: Not on file     Review of Systems   Constitutional:  Negative for activity change, chills, diaphoresis, fatigue and fever.   HENT:  Negative for congestion, rhinorrhea and sore throat.    Respiratory:  Positive for cough (chronic, productive). Negative for chest tightness, shortness of breath and wheezing.    Cardiovascular:  Negative for chest pain, palpitations and leg swelling.   Gastrointestinal:  Positive for constipation. Negative for abdominal distention, abdominal pain, blood in stool, diarrhea, nausea and vomiting.   Genitourinary:  Negative for difficulty urinating, dysuria, frequency, hematuria and urgency.   Musculoskeletal:  Positive for gait problem. Negative for arthralgias, back pain and neck stiffness.   Neurological:  Negative for dizziness, tremors, seizures, syncope, weakness, light-headedness, numbness and headaches.   Psychiatric/Behavioral:  Negative for agitation, confusion and hallucinations.      Objective:     Vital Signs (Most Recent):  Temp: 98.1 °F (36.7 °C) (03/27/25 1302)  Pulse: 84 (03/27/25 1302)  Resp: 18 (03/27/25 1302)  BP: (!) 155/83 (03/27/25 1302)  SpO2: 98 % (03/27/25 1302) Vital Signs (24h Range):  Temp:  [98.1 °F (36.7 °C)] 98.1 °F (36.7 °C)  Pulse:  [84] 84  Resp:  [18] 18  SpO2:  [98 %] 98 %  BP: (155)/(83) 155/83     Weight: 45.4 kg (100 lb)  Body mass index is 16.14 kg/m².     Physical Exam  Vitals and nursing note reviewed.   Constitutional:       General: He is not in acute distress.     Appearance: He is well-developed. He is not diaphoretic.   HENT:      Head: Normocephalic and atraumatic.      Right Ear: External ear normal.      Left Ear: External ear normal.      Nose: Nose normal. No congestion.      Mouth/Throat:      Pharynx: Oropharynx is clear.   Eyes:      General: No scleral icterus.     Extraocular Movements: Extraocular movements intact.    Cardiovascular:      Rate and Rhythm: Normal rate and regular rhythm.      Pulses: Normal pulses.      Heart sounds: Normal heart sounds. No murmur heard.  Pulmonary:      Effort: Pulmonary effort is normal. No respiratory distress.      Breath sounds: Normal breath sounds. No wheezing or rales.   Abdominal:      General: Bowel sounds are normal. There is no distension.      Palpations: Abdomen is soft.      Tenderness: There is no abdominal tenderness. There is no guarding or rebound.   Musculoskeletal:      Cervical back: Normal range of motion.      Right lower leg: No edema.      Left lower leg: No edema.   Skin:     General: Skin is warm and dry.      Capillary Refill: Capillary refill takes less than 2 seconds. Left toe wound present. No lower extremity swelling or tenderness to palpation of calves.   Neurological:      General: No focal deficit present.      Mental Status: He is alert and oriented to person, place, and time. Mental status is at baseline.   Psychiatric:         Mood and Affect: Mood normal.         Behavior: Behavior normal.         Thought Content: Thought content normal.                Significant Labs: All pertinent labs within the past 24 hours have been reviewed.  CBC:   Recent Labs   Lab 03/26/25  0000 03/27/25  1456   WBC  --  9.95   HGB 6.6* 6.8*   HCT  --  21.7*   PLT  --  309     CMP:   Recent Labs   Lab 03/27/25  1456      K 3.4*      CO2 29   BUN 25*   CREATININE 3.0*   CALCIUM 8.2*   ALBUMIN 2.0*   BILITOT 0.2   ALKPHOS 45   AST 21   ALT 11   ANIONGAP 7*         Significant Imaging: I have reviewed all pertinent imaging results/findings within the past 24 hours.  Imaging Results              CT Abdomen Pelvis  Without Contrast (In process)  Result time 03/27/25 17:07:25                     X-Ray Chest AP Portable (Final result)  Result time 03/27/25 16:04:47      Final result by Abdirashid Trinidad MD (03/27/25 16:04:47)                   Impression:      Findings  suggestive of pulmonary edema with bilateral pleural effusions, left greater than right      Electronically signed by: Abdirashid Trinidad MD  Date:    03/27/2025  Time:    16:04               Narrative:    EXAMINATION:  XR CHEST AP PORTABLE    CLINICAL HISTORY:  Cough, unspecified    TECHNIQUE:  Single views of the chest were performed.    COMPARISON:  Chest radiograph dated October 8, 2021    FINDINGS:  Right IJ approach tunneled hemodialysis catheter is noted, tip projecting over the distal SVC.  The trachea and cardiomediastinal silhouette remains stable.  There is increased perihilar, interstitial and bibasilar opacities suggestive of pulmonary edema with bilateral pleural effusions (left greater than right).  No evidence for large pneumothorax.  Osseous structures demonstrate no evidence for acute fractures or dislocations.                                     Assessment/Plan:     Assessment & Plan  Symptomatic anemia  Anemia is likely due to  unknown suspect GI . Most recent hemoglobin and hematocrit are listed below.  Recent Labs     03/26/25  0000 03/27/25  1456   HGB 6.6* 6.8*   HCT  --  21.7*     Plan  - Monitor serial CBC: Every 8 hours  - Transfuse PRBC if patient becomes hemodynamically unstable, symptomatic or H/H drops below 7/21.  - Patient has received 1 units of PRBCs on 3/27  - Patient's anemia is currently stable  - CLD, NPO at mn  - IV Protonix 40mg BID  - IVF hydration   - 2 large bore IV access  - anti-emetics as needed  - GI consulted, appreciate recs  Seizure disorder  - continue phenobarbital and zonisamide  Stage 3b chronic kidney disease  Creatine stable for now. BMP reviewed- noted Estimated Creatinine Clearance: 13.7 mL/min (A) (based on SCr of 3 mg/dL (H)). according to latest data. Based on current GFR, CKD stage is stage 3 - GFR 30-59.  Monitor UOP and serial BMP and adjust therapy as needed. Renally dose meds. Avoid nephrotoxic medications and procedures.  Generalized weakness  -  PTOT    Atrial fibrillation  Patient has paroxysmal (<7 days) atrial fibrillation. Patient is currently in sinus rhythm. [id number] Score: 2. The patients heart rate in the last 24 hours is as follows:  Pulse  Min: 84  Max: 84     Antiarrhythmics  , Daily, Oral  , Daily, Oral  amiodarone tablet 200 mg, Daily, Oral  metoprolol succinate (TOPROL-XL) 24 hr tablet 25 mg, Daily, Oral    Anticoagulants       Plan  - Replete lytes with a goal of K>4, Mg >2  - Patient is not anticoagulated due to risk of bleeding  - Patient's afib is currently controlled    Pleural effusion  Patient found to have small pleural effusion on imaging. I have personally reviewed and interpreted the following imaging: Xray. A thoracentesis was deferred. Most likely etiology includes Congestive Heart Failure. Management to include Diuresis    - lasix 40 mg x1  - echo pending  VTE Risk Mitigation (From admission, onward)           Ordered     Place sequential compression device  Until discontinued         03/27/25 1704     IP VTE HIGH RISK PATIENT  Once         03/27/25 1704     Place sequential compression device  Until discontinued         03/27/25 1704     Reason for no Mechanical VTE Prophylaxis  Once        Question:  Reasons:  Answer:  Risk of Bleeding    03/27/25 1704                         On 03/27/2025, patient should be placed in hospital observation services under my care in collaboration with Dr. Sandoval.           Nestor Snow PA-C  Department of Hospital Medicine  [facility] - Emergency Dept

## 2025-03-27 NOTE — ASSESSMENT & PLAN NOTE
Creatine stable for now. BMP reviewed- noted Estimated Creatinine Clearance: 13.7 mL/min (A) (based on SCr of 3 mg/dL (H)). according to latest data. Based on current GFR, CKD stage is stage 3 - GFR 30-59.  Monitor UOP and serial BMP and adjust therapy as needed. Renally dose meds. Avoid nephrotoxic medications and procedures.

## 2025-03-27 NOTE — ASSESSMENT & PLAN NOTE
Patient has paroxysmal (<7 days) atrial fibrillation. Patient is currently in sinus rhythm. [id number] Score: 2. The patients heart rate in the last 24 hours is as follows:  Pulse  Min: 84  Max: 84     Antiarrhythmics  , Daily, Oral  , Daily, Oral  amiodarone tablet 200 mg, Daily, Oral  metoprolol succinate (TOPROL-XL) 24 hr tablet 25 mg, Daily, Oral    Anticoagulants       Plan  - Replete lytes with a goal of K>4, Mg >2  - Patient is not anticoagulated due to risk of bleeding  - Patient's afib is currently controlled

## 2025-03-27 NOTE — HPI
Mr. Zelalem Gamez arpit 74 year old male with a pMHx of FrEF (EF 35%), hypertension, peripheral arterial disease, chronic kidney disease on hemodialysis (MWF), seizure disorder who presentsafter routine labs revealed hemoglobin of 6.6. He is currently residing at a SNF after being discharged from the hospital on 03/07. Patient reports he has been having persistent constipation for several weeks. He has not noticed any blood in his stools, however, he reports that a nurse changes his briefs for him. He has been able to get out of bed with the help of PT but is mostly confined to his bed due to generalized weakness that has been ongoing since he was discharged from the hospital. He also endorses nasal congestion and cough with sputum production for several weeks. He is on 1-2L of O2 at baseline. Patient was admitted from 2/21-03/07 after a syncopal event. He was found to have several issues going on, including acute blood loss anemia requiring 2 units PRBCs. At this time, he also had an ANGEL on CKD along with rhabdomyolysis, so a tunneled catheter was placed and he was started on dialysis. He has been going to dialysis regularly. He denies headache, fever, chills, hemoptysis, decreased appetite, chest pain, abdominal pain, nausea, vomiting, dysuria, hematuria, or lower extremity pain/swelling.      In ED, Pt AFVSS on 1L NC. Hgb 6.6>6.8. No leukocytosis. K 3.4. Cr 3, previously 2.8. Trop 19. CXR: Findings suggestive of pulmonary edema with bilateral pleural effusions, left greater than right. Admitted to  for c/f GIB.

## 2025-03-27 NOTE — SUBJECTIVE & OBJECTIVE
Past Medical History:   Diagnosis Date    Arthritis     Epilepsy     Hyperglycemia     Stroke        Past Surgical History:   Procedure Laterality Date    CHOLECYSTECTOMY      JOINT REPLACEMENT      SKIN CANCER EXCISION         Review of patient's allergies indicates:  No Known Allergies    No current facility-administered medications on file prior to encounter.     Current Outpatient Medications on File Prior to Encounter   Medication Sig    zinc sulfate (ZINCATE) 50 mg zinc (220 mg) capsule Take 1 capsule by mouth once daily.    acetaminophen (TYLENOL) 500 MG tablet Take 1 tablet (500 mg total) by mouth every 6 (six) hours as needed for Pain.    amiodarone (PACERONE) 200 MG Tab Take 200 mg by mouth once daily.    ARIPiprazole (ABILIFY) 2 MG Tab Take 2 mg by mouth once daily.    ascorbic acid, vitamin C, (VITAMIN C) 250 MG tablet Take 1 tablet (250 mg total) by mouth once daily.    ferrous sulfate (FEOSOL) Tab tablet Take 1 tablet (1 each total) by mouth once daily.    gabapentin (NEURONTIN) 100 MG capsule Take 100 mg by mouth 2 (two) times daily.    metoprolol succinate (TOPROL-XL) 25 MG 24 hr tablet Take 25 mg by mouth once daily.    PHENobarbitaL (LUMINAL) 16.2 MG tablet Take 32 mg by mouth once daily.    sevelamer carbonate (RENVELA) 800 mg Tab Take 1 tablet (800 mg total) by mouth 3 (three) times daily with meals.    vitamin D (VITAMIN D3) 1000 units Tab Take 2 tablets (2,000 Units total) by mouth once daily.    zonisamide (ZONEGRAN) 100 MG Cap Take by mouth.     Family History       Problem Relation (Age of Onset)    Diabetes Mother          Tobacco Use    Smoking status: Every Day     Current packs/day: 1.00     Average packs/day: 1 pack/day for 45.0 years (45.0 ttl pk-yrs)     Types: Cigarettes    Smokeless tobacco: Never   Substance and Sexual Activity    Alcohol use: No    Drug use: No    Sexual activity: Not on file     Review of Systems   Constitutional:  Negative for activity change, chills,  diaphoresis, fatigue and fever.   HENT:  Negative for congestion, rhinorrhea and sore throat.    Respiratory:  Positive for cough (chronic, productive). Negative for chest tightness, shortness of breath and wheezing.    Cardiovascular:  Negative for chest pain, palpitations and leg swelling.   Gastrointestinal:  Positive for constipation. Negative for abdominal distention, abdominal pain, blood in stool, diarrhea, nausea and vomiting.   Genitourinary:  Negative for difficulty urinating, dysuria, frequency, hematuria and urgency.   Musculoskeletal:  Positive for gait problem. Negative for arthralgias, back pain and neck stiffness.   Neurological:  Negative for dizziness, tremors, seizures, syncope, weakness, light-headedness, numbness and headaches.   Psychiatric/Behavioral:  Negative for agitation, confusion and hallucinations.      Objective:     Vital Signs (Most Recent):  Temp: 98.1 °F (36.7 °C) (03/27/25 1302)  Pulse: 84 (03/27/25 1302)  Resp: 18 (03/27/25 1302)  BP: (!) 155/83 (03/27/25 1302)  SpO2: 98 % (03/27/25 1302) Vital Signs (24h Range):  Temp:  [98.1 °F (36.7 °C)] 98.1 °F (36.7 °C)  Pulse:  [84] 84  Resp:  [18] 18  SpO2:  [98 %] 98 %  BP: (155)/(83) 155/83     Weight: 45.4 kg (100 lb)  Body mass index is 16.14 kg/m².     Physical Exam  Vitals and nursing note reviewed.   Constitutional:       General: He is not in acute distress.     Appearance: He is well-developed. He is not diaphoretic.   HENT:      Head: Normocephalic and atraumatic.      Right Ear: External ear normal.      Left Ear: External ear normal.      Nose: Nose normal. No congestion.      Mouth/Throat:      Pharynx: Oropharynx is clear.   Eyes:      General: No scleral icterus.     Extraocular Movements: Extraocular movements intact.   Cardiovascular:      Rate and Rhythm: Normal rate and regular rhythm.      Pulses: Normal pulses.      Heart sounds: Normal heart sounds. No murmur heard.  Pulmonary:      Effort: Pulmonary effort is normal.  No respiratory distress.      Breath sounds: Normal breath sounds. No wheezing or rales.   Abdominal:      General: Bowel sounds are normal. There is no distension.      Palpations: Abdomen is soft.      Tenderness: There is no abdominal tenderness. There is no guarding or rebound.   Musculoskeletal:      Cervical back: Normal range of motion.      Right lower leg: No edema.      Left lower leg: No edema.   Skin:     General: Skin is warm and dry.      Capillary Refill: Capillary refill takes less than 2 seconds.   Neurological:      General: No focal deficit present.      Mental Status: He is alert and oriented to person, place, and time. Mental status is at baseline.   Psychiatric:         Mood and Affect: Mood normal.         Behavior: Behavior normal.         Thought Content: Thought content normal.                Significant Labs: All pertinent labs within the past 24 hours have been reviewed.  CBC:   Recent Labs   Lab 03/26/25  0000 03/27/25  1456   WBC  --  9.95   HGB 6.6* 6.8*   HCT  --  21.7*   PLT  --  309     CMP:   Recent Labs   Lab 03/27/25  1456      K 3.4*      CO2 29   BUN 25*   CREATININE 3.0*   CALCIUM 8.2*   ALBUMIN 2.0*   BILITOT 0.2   ALKPHOS 45   AST 21   ALT 11   ANIONGAP 7*         Significant Imaging: I have reviewed all pertinent imaging results/findings within the past 24 hours.  Imaging Results              CT Abdomen Pelvis  Without Contrast (In process)  Result time 03/27/25 17:07:25                     X-Ray Chest AP Portable (Final result)  Result time 03/27/25 16:04:47      Final result by Abdirashid Trinidad MD (03/27/25 16:04:47)                   Impression:      Findings suggestive of pulmonary edema with bilateral pleural effusions, left greater than right      Electronically signed by: Abdirashid Trinidad MD  Date:    03/27/2025  Time:    16:04               Narrative:    EXAMINATION:  XR CHEST AP PORTABLE    CLINICAL HISTORY:  Cough, unspecified    TECHNIQUE:  Single views of  the chest were performed.    COMPARISON:  Chest radiograph dated October 8, 2021    FINDINGS:  Right IJ approach tunneled hemodialysis catheter is noted, tip projecting over the distal SVC.  The trachea and cardiomediastinal silhouette remains stable.  There is increased perihilar, interstitial and bibasilar opacities suggestive of pulmonary edema with bilateral pleural effusions (left greater than right).  No evidence for large pneumothorax.  Osseous structures demonstrate no evidence for acute fractures or dislocations.

## 2025-03-27 NOTE — ASSESSMENT & PLAN NOTE
Patient found to have small pleural effusion on imaging. I have personally reviewed and interpreted the following imaging: Xray. A thoracentesis was deferred. Most likely etiology includes Congestive Heart Failure. Management to include Diuresis    - lasix 40 mg x1  - echo pending

## 2025-03-27 NOTE — ASSESSMENT & PLAN NOTE
Anemia is likely due to unknown suspect GI. Most recent hemoglobin and hematocrit are listed below.  Recent Labs     03/26/25  0000 03/27/25  1456   HGB 6.6* 6.8*   HCT  --  21.7*     Plan  - Monitor serial CBC: Every 8 hours  - Transfuse PRBC if patient becomes hemodynamically unstable, symptomatic or H/H drops below 7/21.  - Patient has received 1 units of PRBCs on 3/27  - Patient's anemia is currently stable  - CLD, NPO at [place]  - IV Protonix 40mg BID  - IVF hydration   - 2 large bore IV access  - anti-emetics as needed  - GI consulted, appreciate recs

## 2025-03-28 ENCOUNTER — ANESTHESIA EVENT (OUTPATIENT)
Dept: ENDOSCOPY | Facility: HOSPITAL | Age: 75
End: 2025-03-28
Payer: MEDICARE

## 2025-03-28 PROBLEM — K59.00 CONSTIPATION: Status: ACTIVE | Noted: 2025-03-28

## 2025-03-28 PROBLEM — N17.9 AKI (ACUTE KIDNEY INJURY): Status: ACTIVE | Noted: 2025-03-28

## 2025-03-28 PROBLEM — K52.89 STERCORAL COLITIS: Status: ACTIVE | Noted: 2025-03-28

## 2025-03-28 LAB
ABSOLUTE EOSINOPHIL (OHS): 0.03 K/UL
ABSOLUTE EOSINOPHIL (OHS): 0.08 K/UL
ABSOLUTE MONOCYTE (OHS): 0.64 K/UL (ref 0.3–1)
ABSOLUTE MONOCYTE (OHS): 0.74 K/UL (ref 0.3–1)
ABSOLUTE NEUTROPHIL COUNT (OHS): 10.45 K/UL (ref 1.8–7.7)
ABSOLUTE NEUTROPHIL COUNT (OHS): 9.15 K/UL (ref 1.8–7.7)
ANION GAP (OHS): 11 MMOL/L (ref 8–16)
ASCENDING AORTA: 2.62 CM
AV AREA BY CONTINUOUS VTI: 3 CM2
AV INDEX (PROSTH): 0.95
AV LVOT MEAN GRADIENT: 1 MMHG
AV LVOT PEAK GRADIENT: 3 MMHG
AV MEAN GRADIENT: 2 MMHG
AV PEAK GRADIENT: 3 MMHG
AV VALVE AREA BY VELOCITY RATIO: 2.8 CM²
AV VALVE AREA: 3 CM2
AV VELOCITY RATIO: 0.89
BASOPHILS # BLD AUTO: 0.05 K/UL
BASOPHILS # BLD AUTO: 0.06 K/UL
BASOPHILS NFR BLD AUTO: 0.4 %
BASOPHILS NFR BLD AUTO: 0.5 %
BSA FOR ECHO PROCEDURE: 1.45 M2
BUN SERPL-MCNC: 27 MG/DL (ref 8–23)
CALCIUM SERPL-MCNC: 7.9 MG/DL (ref 8.7–10.5)
CHLORIDE SERPL-SCNC: 103 MMOL/L (ref 95–110)
CO2 SERPL-SCNC: 22 MMOL/L (ref 23–29)
CREAT SERPL-MCNC: 3.2 MG/DL (ref 0.5–1.4)
CV ECHO LV RWT: 0.36 CM
DOP CALC AO PEAK VEL: 0.9 M/S
DOP CALC AO VTI: 21.7 CM
DOP CALC LVOT AREA: 3.1 CM2
DOP CALC LVOT DIAMETER: 2 CM
DOP CALC LVOT PEAK VEL: 0.8 M/S
DOP CALC LVOT STROKE VOLUME: 64.7 CM3
DOP CALCLVOT PEAK VEL VTI: 20.6 CM
E WAVE DECELERATION TIME: 114 MS
E/A RATIO: 1.46
E/E' RATIO: 12 M/S
ECHO EF ESTIMATED: 60 %
ECHO LV POSTERIOR WALL: 0.8 CM (ref 0.6–1.1)
EJECTION FRACTION: 45 %
ERYTHROCYTE [DISTWIDTH] IN BLOOD BY AUTOMATED COUNT: 20.5 % (ref 11.5–14.5)
ERYTHROCYTE [DISTWIDTH] IN BLOOD BY AUTOMATED COUNT: 21.2 % (ref 11.5–14.5)
FRACTIONAL SHORTENING: 31.8 % (ref 28–44)
GFR SERPLBLD CREATININE-BSD FMLA CKD-EPI: 19 ML/MIN/1.73/M2
GLUCOSE SERPL-MCNC: 78 MG/DL (ref 70–110)
HCT VFR BLD AUTO: 28.6 % (ref 40–54)
HCT VFR BLD AUTO: 29.7 % (ref 40–54)
HGB BLD-MCNC: 9.3 GM/DL (ref 14–18)
HGB BLD-MCNC: 9.7 GM/DL (ref 14–18)
IMM GRANULOCYTES # BLD AUTO: 0.11 K/UL (ref 0–0.04)
IMM GRANULOCYTES # BLD AUTO: 0.15 K/UL (ref 0–0.04)
IMM GRANULOCYTES NFR BLD AUTO: 0.9 % (ref 0–0.5)
IMM GRANULOCYTES NFR BLD AUTO: 1.3 % (ref 0–0.5)
INTERVENTRICULAR SEPTUM: 0.9 CM (ref 0.6–1.1)
LA MAJOR: 5.7 CM
LA MINOR: 6.3 CM
LA WIDTH: 4.4 CM
LEFT ATRIUM SIZE: 3.8 CM
LEFT ATRIUM VOLUME INDEX MOD: 53 ML/M2
LEFT ATRIUM VOLUME INDEX: 57 ML/M2
LEFT ATRIUM VOLUME MOD: 79 ML
LEFT ATRIUM VOLUME: 85 CM3
LEFT INTERNAL DIMENSION IN SYSTOLE: 3 CM (ref 2.1–4)
LEFT VENTRICLE DIASTOLIC VOLUME INDEX: 60.14 ML/M2
LEFT VENTRICLE DIASTOLIC VOLUME: 89 ML
LEFT VENTRICLE MASS INDEX: 80.1 G/M2
LEFT VENTRICLE SYSTOLIC VOLUME INDEX: 23.6 ML/M2
LEFT VENTRICLE SYSTOLIC VOLUME: 35 ML
LEFT VENTRICULAR INTERNAL DIMENSION IN DIASTOLE: 4.4 CM (ref 3.5–6)
LEFT VENTRICULAR MASS: 118.6 G
LV LATERAL E/E' RATIO: 10.5
LV SEPTAL E/E' RATIO: 15
LYMPHOCYTES # BLD AUTO: 0.7 K/UL (ref 1–4.8)
LYMPHOCYTES # BLD AUTO: 0.97 K/UL (ref 1–4.8)
MAGNESIUM SERPL-MCNC: 2.3 MG/DL (ref 1.6–2.6)
MCH RBC QN AUTO: 30.9 PG (ref 27–50)
MCH RBC QN AUTO: 31.2 PG (ref 27–50)
MCHC RBC AUTO-ENTMCNC: 32.5 G/DL (ref 32–36)
MCHC RBC AUTO-ENTMCNC: 32.7 G/DL (ref 32–36)
MCV RBC AUTO: 95 FL (ref 82–98)
MCV RBC AUTO: 96 FL (ref 82–98)
MV A" WAVE DURATION": 114.18 MS
MV PEAK A VEL: 0.72 M/S
MV PEAK E VEL: 1.05 M/S
NUCLEATED RBC (/100WBC) (OHS): 0 /100 WBC
NUCLEATED RBC (/100WBC) (OHS): 0 /100 WBC
OHS CV RV/LV RATIO: 0.73 CM
PISA TR MAX VEL: 3.5 M/S
PLATELET # BLD AUTO: 259 K/UL (ref 150–450)
PLATELET # BLD AUTO: 289 K/UL (ref 150–450)
PMV BLD AUTO: 10.8 FL (ref 9.2–12.9)
PMV BLD AUTO: 11.2 FL (ref 9.2–12.9)
POTASSIUM SERPL-SCNC: 3.7 MMOL/L (ref 3.5–5.1)
PULM VEIN A" WAVE DURATION": 114.18 MS
PULM VEIN S/D RATIO: 1.05
PULMONIC VEIN PEAK A VELOCITY: 0.3 M/S
PV PEAK D VEL: 0.76 M/S
PV PEAK S VEL: 0.8 M/S
RA MAJOR: 4.67 CM
RA PRESSURE ESTIMATED: 15 MMHG
RA WIDTH: 3.83 CM
RBC # BLD AUTO: 2.98 M/UL (ref 4.6–6.2)
RBC # BLD AUTO: 3.14 M/UL (ref 4.6–6.2)
RELATIVE EOSINOPHIL (OHS): 0.3 %
RELATIVE EOSINOPHIL (OHS): 0.7 %
RELATIVE LYMPHOCYTE (OHS): 5.8 % (ref 18–48)
RELATIVE LYMPHOCYTE (OHS): 8.7 % (ref 18–48)
RELATIVE MONOCYTE (OHS): 5.3 % (ref 4–15)
RELATIVE MONOCYTE (OHS): 6.6 % (ref 4–15)
RELATIVE NEUTROPHIL (OHS): 82.3 % (ref 38–73)
RELATIVE NEUTROPHIL (OHS): 87.2 % (ref 38–73)
RIGHT VENTRICLE DIASTOLIC BASEL DIMENSION: 3.2 CM
RV TB RVSP: 19 MMHG
SINUS: 2.96 CM
SODIUM SERPL-SCNC: 136 MMOL/L (ref 136–145)
STJ: 2.4 CM
TDI LATERAL: 0.1 M/S
TDI SEPTAL: 0.07 M/S
TDI: 0.09 M/S
TRICUSPID ANNULAR PLANE SYSTOLIC EXCURSION: 1.6 CM
TV PEAK GRADIENT: 48 MMHG
TV REST PULMONARY ARTERY PRESSURE: 64 MMHG
WBC # BLD AUTO: 11.14 K/UL (ref 3.9–12.7)
WBC # BLD AUTO: 11.99 K/UL (ref 3.9–12.7)
Z-SCORE OF LEFT VENTRICULAR DIMENSION IN END DIASTOLE: -0.03
Z-SCORE OF LEFT VENTRICULAR DIMENSION IN END SYSTOLE: 0.72

## 2025-03-28 PROCEDURE — 85025 COMPLETE CBC W/AUTO DIFF WBC: CPT | Performed by: PHYSICIAN ASSISTANT

## 2025-03-28 PROCEDURE — 97530 THERAPEUTIC ACTIVITIES: CPT

## 2025-03-28 PROCEDURE — 80048 BASIC METABOLIC PNL TOTAL CA: CPT | Performed by: PHYSICIAN ASSISTANT

## 2025-03-28 PROCEDURE — 85025 COMPLETE CBC W/AUTO DIFF WBC: CPT | Mod: 91 | Performed by: PHYSICIAN ASSISTANT

## 2025-03-28 PROCEDURE — 25000003 PHARM REV CODE 250: Performed by: STUDENT IN AN ORGANIZED HEALTH CARE EDUCATION/TRAINING PROGRAM

## 2025-03-28 PROCEDURE — 21400001 HC TELEMETRY ROOM

## 2025-03-28 PROCEDURE — 97112 NEUROMUSCULAR REEDUCATION: CPT

## 2025-03-28 PROCEDURE — 83735 ASSAY OF MAGNESIUM: CPT | Performed by: PHYSICIAN ASSISTANT

## 2025-03-28 PROCEDURE — 36415 COLL VENOUS BLD VENIPUNCTURE: CPT | Performed by: PHYSICIAN ASSISTANT

## 2025-03-28 PROCEDURE — 97162 PT EVAL MOD COMPLEX 30 MIN: CPT

## 2025-03-28 PROCEDURE — 99223 1ST HOSP IP/OBS HIGH 75: CPT | Mod: GC,,, | Performed by: INTERNAL MEDICINE

## 2025-03-28 PROCEDURE — 25000003 PHARM REV CODE 250: Performed by: PHYSICIAN ASSISTANT

## 2025-03-28 PROCEDURE — 63600175 PHARM REV CODE 636 W HCPCS: Performed by: PHYSICIAN ASSISTANT

## 2025-03-28 PROCEDURE — 63600175 PHARM REV CODE 636 W HCPCS: Performed by: STUDENT IN AN ORGANIZED HEALTH CARE EDUCATION/TRAINING PROGRAM

## 2025-03-28 PROCEDURE — 97165 OT EVAL LOW COMPLEX 30 MIN: CPT

## 2025-03-28 RX ORDER — HYDRALAZINE HYDROCHLORIDE 25 MG/1
25 TABLET, FILM COATED ORAL EVERY 8 HOURS
Status: DISCONTINUED | OUTPATIENT
Start: 2025-03-28 | End: 2025-03-29

## 2025-03-28 RX ORDER — PSEUDOEPHEDRINE/ACETAMINOPHEN 30MG-500MG
100 TABLET ORAL
Status: DISPENSED | OUTPATIENT
Start: 2025-03-28 | End: 2025-03-28

## 2025-03-28 RX ORDER — AMLODIPINE BESYLATE 10 MG/1
10 TABLET ORAL DAILY
Status: DISCONTINUED | OUTPATIENT
Start: 2025-03-28 | End: 2025-04-04 | Stop reason: HOSPADM

## 2025-03-28 RX ORDER — POLYETHYLENE GLYCOL 3350, SODIUM SULFATE ANHYDROUS, SODIUM BICARBONATE, SODIUM CHLORIDE, POTASSIUM CHLORIDE 236; 22.74; 6.74; 5.86; 2.97 G/4L; G/4L; G/4L; G/4L; G/4L
4000 POWDER, FOR SOLUTION ORAL ONCE
Status: COMPLETED | OUTPATIENT
Start: 2025-03-30 | End: 2025-03-30

## 2025-03-28 RX ORDER — FUROSEMIDE 10 MG/ML
40 INJECTION INTRAMUSCULAR; INTRAVENOUS ONCE
Status: COMPLETED | OUTPATIENT
Start: 2025-03-28 | End: 2025-03-28

## 2025-03-28 RX ORDER — SYRING-NEEDL,DISP,INSUL,0.3 ML 29 G X1/2"
296 SYRINGE, EMPTY DISPOSABLE MISCELLANEOUS
Status: DISPENSED | OUTPATIENT
Start: 2025-03-28 | End: 2025-03-28

## 2025-03-28 RX ORDER — CEFTRIAXONE 2 G/1
2 INJECTION, POWDER, FOR SOLUTION INTRAMUSCULAR; INTRAVENOUS
Status: DISCONTINUED | OUTPATIENT
Start: 2025-03-28 | End: 2025-03-31

## 2025-03-28 RX ORDER — SODIUM CHLORIDE 0.9 G/100ML
500 IRRIGANT IRRIGATION
Status: ACTIVE | OUTPATIENT
Start: 2025-03-28 | End: 2025-03-28

## 2025-03-28 RX ADMIN — SENNOSIDES 8.6 MG: 8.6 TABLET, FILM COATED ORAL at 09:03

## 2025-03-28 RX ADMIN — PANTOPRAZOLE SODIUM 40 MG: 40 INJECTION, POWDER, FOR SOLUTION INTRAVENOUS at 09:03

## 2025-03-28 RX ADMIN — ARIPIPRAZOLE 2 MG: 2 TABLET ORAL at 09:03

## 2025-03-28 RX ADMIN — HYDRALAZINE HYDROCHLORIDE 25 MG: 25 TABLET ORAL at 03:03

## 2025-03-28 RX ADMIN — FUROSEMIDE 40 MG: 10 INJECTION, SOLUTION INTRAVENOUS at 03:03

## 2025-03-28 RX ADMIN — FERROUS SULFATE TAB 325 MG (65 MG ELEMENTAL FE) 1 EACH: 325 (65 FE) TAB at 11:03

## 2025-03-28 RX ADMIN — SEVELAMER CARBONATE 800 MG: 800 TABLET, FILM COATED ORAL at 11:03

## 2025-03-28 RX ADMIN — ACETAMINOPHEN 650 MG: 325 TABLET ORAL at 12:03

## 2025-03-28 RX ADMIN — CEFTRIAXONE 2 G: 2 INJECTION, POWDER, FOR SOLUTION INTRAMUSCULAR; INTRAVENOUS at 03:03

## 2025-03-28 RX ADMIN — AMIODARONE HYDROCHLORIDE 200 MG: 200 TABLET ORAL at 09:03

## 2025-03-28 RX ADMIN — Medication 250 MG: at 09:03

## 2025-03-28 RX ADMIN — POLYETHYLENE GLYCOL 3350 17 G: 17 POWDER, FOR SOLUTION ORAL at 09:03

## 2025-03-28 RX ADMIN — PHENOBARBITAL 32.4 MG: 32.4 TABLET ORAL at 09:03

## 2025-03-28 RX ADMIN — Medication 2000 UNITS: at 09:03

## 2025-03-28 RX ADMIN — SEVELAMER CARBONATE 800 MG: 800 TABLET, FILM COATED ORAL at 05:03

## 2025-03-28 RX ADMIN — HYDRALAZINE HYDROCHLORIDE 25 MG: 25 TABLET ORAL at 11:03

## 2025-03-28 RX ADMIN — ZONISAMIDE 100 MG: 100 CAPSULE ORAL at 11:03

## 2025-03-28 RX ADMIN — SODIUM CHLORIDE 500 ML: 9 INJECTION, SOLUTION INTRAVENOUS at 11:03

## 2025-03-28 RX ADMIN — PANTOPRAZOLE SODIUM 40 MG: 40 INJECTION, POWDER, FOR SOLUTION INTRAVENOUS at 11:03

## 2025-03-28 RX ADMIN — AMLODIPINE BESYLATE 10 MG: 10 TABLET ORAL at 03:03

## 2025-03-28 RX ADMIN — METOPROLOL SUCCINATE 25 MG: 25 TABLET, EXTENDED RELEASE ORAL at 09:03

## 2025-03-28 NOTE — PT/OT/SLP EVAL
Physical Therapy Evaluation  Co-evaluation with OT due to acuity of condition, level of skilled assist needed for assessment of safety with mobility.   Patient Name:  Zelalem Gamez   MRN:  [id number]    Recommendations:     Discharge Recommendations: Moderate Intensity Therapy   Discharge Equipment Recommendations: lift device, hospital bed   Barriers to discharge: Inaccessible home, Decreased caregiver support, and level of skilled assist needed    Assessment:     Zelalem Gamez is a 74 y.o. male admitted with a medical diagnosis of Symptomatic anemia.  He presents with the following impairments/functional limitations: weakness, gait instability, impaired balance, impaired self care skills, impaired functional mobility, impaired cognition, decreased coordination, decreased safety awareness, decreased lower extremity function, decreased upper extremity function, pain, abnormal tone, decreased ROM, impaired joint extensibility, impaired muscle length, impaired skin. The patient's mobility is limited due to generalized weakness from prolonged bed rest and complex medical condition, L LE severely contracted, patient with poor safety awareness (stated he was ambulatory prior to admission, poor insight into deficits). Supine to sit with maximum assistance, sitting balance between contact guard assist to moderate assistance. Performed partial stand with maximum assistance, patient Wbing only through R LE due to L LE contracture. Patient insistent upon transferring to chair, but due to poor awareness of his deficits, not currently safe to leave patient in chair unattended. Patient currently demonstrates a need for moderate intensity therapy on a daily basis post acute secondary to a decline in functional status due to illness     Rehab Prognosis: Fair; patient would benefit from acute skilled PT services to address these deficits and reach maximum level of function.    Recent Surgery: Procedure(s) (LRB):  EGD  "(ESOPHAGOGASTRODUODENOSCOPY) (N/A)  COLONOSCOPY (N/A)      Plan:     During this hospitalization, patient to be seen 3 x/week to address the identified rehab impairments via therapeutic activities, therapeutic exercises, neuromuscular re-education, wheelchair management/training and progress toward the following goals:    Plan of Care Expires:  04/27/25    Subjective     Chief Complaint: "Its wrong to keep me in this bed", "I was walking in the bars at SNF" immediately prior to admission- unlikely given severity of contracture  Patient/Family Comments/goals: return to PLOF  Pain/Comfort:  Pain Rating 1:  (did not rate)  Location - Side 1: Left  Location - Orientation 1: generalized  Location 1: heel  Pain Addressed 1: Reposition, Distraction    Patients cultural, spiritual, Taoist conflicts given the current situation: no    Living Environment:  The patient reports living with his wife in [facility], [address] [facility], [place]   Prior to admission, patients level of function was per his report ambulatory with RW prior to admission- unlikely given severity of L LE contracture.  Equipment used at home: walker, rolling, bedside commode, wheelchair, bath bench.  DME owned (not currently used): none.  Upon discharge, patient will have unclear level of assistance at home.    Objective:     Communicated with Rn prior to session.  Patient found HOB elevated with  (no active lines)  upon PT entry to room.    General Precautions: Standard, fall  Orthopedic Precautions:RUE non weight bearing (R clavicle frax per PT notes from 2/24/2025)   Braces: N/A  Respiratory Status: Nasal cannula, flow 2 L/min    Exams:    Cognitive Exam  Patient is A&O x4 and follows 100% of one -step commands    Fine Motor Coordination   -       Impaired, reports history of CVA     Postural Exam Patient presented with the following abnormalities:    -       Rounded shoulders  -       Forward head  -       Kyphosis  -       Posterior pelvic tilt  -       Weight shift " posterior and R lean   Sensation    -       Light touch intact DEBBIE LE   Skin Integrity/Edema     -       Skin integrity: wound to L heel, RN notified, pressure relief boot in place  -       Edema: NA   R LE ROM WFL, limited R knee extension with increased tone   R LE Strength 3-/5 hip flexion, 3+/5 knee ext/flex, and ankle DF/PF   L LE ROM Hip and knee flexion contracture >90 deg    L LE Strength  2-/5 hip flexion, knee ext/flex, and ankle DF/PF       Balance   Static Sitting contact guard assist to moderate assistance    Dynamic Sitting minimum assistance to moderate assistance    Static Standing maximum assistance HHA   Dynamic Standing       NA        Functional Mobility:    Bed Mobility  Rolling to R and L: minimum assistance   Supine to Sit on the R side:  maximum assistance   Sit to supine: total assistance   Scoot to HOB in supine: minimum assistance, cued for bridging   Scoot to EOB in sitting: maximum assistance    Transfers Sit to Stand:  maximum assistance, NWBing to L LE due to contracture          AM-PAC 6 CLICK MOBILITY  Total Score:10       Treatment & Education:  Patient educated on:  -role of therapy  -goals of session  -PT POC  -benefits of out of bed mobility and consequences of immobility  -calling for staff assist to mobilize safely  Patient agreeable to mobilize with therapy.      Sitting edge of bed ~10 minutes, contact guard assist to moderate assistance , weight shift R lean, multidirectional sway  -Posterior pelvic tilt, kyphosis, cervical flexion  -Visual/verbal/manual cues for midline orientation, thoracic and cervical extension  - Cued to avoid Wbing to R UE due to history of R clavicle frax per chart review      Patient left HOB elevated with all lines intact, call button in reach, bed alarm on, and RN notified, pressure relief boot donned .    GOALS:   Multidisciplinary Problems       Physical Therapy Goals          Problem: Physical Therapy    Goal Priority Disciplines Outcome  Interventions   Physical Therapy Goal     PT, PT/OT Progressing    Description: Goals to be met by:      Patient will increase functional independence with mobility by performin. Supine to sit with MInimal Assistance  2. Sit to supine with MInimal Assistance  3. Rolling to Left and Right with Stand-by Assistance.  4. Sit to stand transfer with Moderate Assistance  5. Bed to chair transfer with Moderate Assistance using slideboard vs squat pivot technique.   6. Wheelchair propulsion x100 feet with Stand-by Assistance using UE/LE  7. Sitting at edge of bed x10 minutes with Stand-by Assistance                         DME Justifications:  Patient requires a hospital bed for positioning of the body in ways that are not feasible with an ordinary bed. The patient requires special positioning for pain relief, limited mobility, and/or being unable to independently make changes in body position without the use of a hospital bed. Pillows and wedges will not be adequate for resolving these positional issues.       History:     Past Medical History:   Diagnosis Date    Arthritis     Epilepsy     Hyperglycemia     Stroke        Past Surgical History:   Procedure Laterality Date    CHOLECYSTECTOMY      JOINT REPLACEMENT      SKIN CANCER EXCISION         Time Tracking:     PT Received On: 25  PT Start Time: 1032     PT Stop Time: 1100  PT Total Time (min): 28 min     Billable Minutes: Evaluation 14 and Neuromuscular Re-education 14      2025   [Large Joint Injection] : Large joint injection [Left] : of the left [Knee] : knee [Pain] : pain [Inflammation] : inflammation [X-ray evidence of Osteoarthritis on this or prior visit] : x-ray evidence of Osteoarthritis on this or prior visit [Alcohol] : alcohol [Betadine] : betadine [Ethyl Chloride sprayed topically] : ethyl chloride sprayed topically [Sterile technique used] : sterile technique used [___ cc    3mg] :  Betamethasone (Celestone) ~Vcc of 3mg [___ cc    1%] : Lidocaine ~Vcc of 1%  [___ cc    0.25%] : Bupivacaine (Marcaine) ~Vcc of 0.25%  [Effusion] : effusion [] : Patient tolerated procedure well [Call if redness, pain or fever occur] : call if redness, pain or fever occur [Apply ice for 15min out of every hour for the next 12-24 hours as tolerated] : apply ice for 15 minutes out of every hour for the next 12-24 hours as tolerated [Previous OTC use and PT nontherapeutic] : patient has tried OTC's including aspirin, Ibuprofen, Aleve, etc or prescription NSAIDS, and/or exercises at home and/or physical therapy without satisfactory response [Patient had decreased mobility in the joint] : patient had decreased mobility in the joint [Risks, benefits, alternatives discussed / Verbal consent obtained] : the risks benefits, and alternatives have been discussed, and verbal consent was obtained [Prior failure or difficult injection] : prior failure or difficult injection [All ultrasound images have been permanently captured and stored accordingly in our picture archiving and communication system] : All ultrasound images have been permanently captured and stored accordingly in our picture archiving and communication system [Visualization of the needle and placement of injection was performed without complication] : visualization of the needle and placement of injection was performed without complication [de-identified] : 21cc  [de-identified] : serous

## 2025-03-28 NOTE — ASSESSMENT & PLAN NOTE
74M PMHx CKD on HD (MWF), seizure disorder (on phenobarbital) presents with anemia. Routine labs revealing Hgb 6.6. He describes constipation, abdominal pain and unintended weight loss (approx. 10-15 lbs) but with good appetite.  He responded appropriately to 1U pRBc (repeat Hgb 9.3.) with no reported bleeding events. Prior history of bleeding - No prior history of EGD past, colonoscopy (?1998), he is unsure of the results of the colonoscopy. [place] [facility] [place] for syncopal event and admitted to ICU for management of UTI, acute kidney injury, rhabdomyolysis, acute hypoxic respiratory failure 2/2 to volume overload, atrial fibrillation with RVR.  During the admission, he was notable for anemia requiring 2U pRBC and was evaluated by GI but no intervention was needed at the time.     CT abd pelvis (3/27/25) with findings concerning for large amount of stool within the rectum noting rectal wall thickening with possible consideration of infectious/inflammatory processes. Colonic diverticulosis without convincing findings to suggest diverticulitis.Moderate bilateral pleural effusions with associated pericardial effusion suggest volume overload      Physical exam - notable for cachexia, and abdominal tenderness.   Labs consistent with normocytic anemia; no anemia workup documented     Plan  - Plan for EGD and colonoscopy Monday    - NPO midnight before  - Golytely prep to start at 6pm, to be completed within 4 hours if possible  - Clear liquid diet   - Trend Hgb q24hrs. Transfuse for Hgb <7, unless otherwise indicated  - Maintain IV access with 2 large bore Ivs  - Intravascular resuscitation/support with IVFs   - Bolus IV pantoprazole 80mg followed by 40mg BID  - Please correct any coagulopathy with platelets and FFP for goal of platelets >50K and INR <2.0  - Please notify GI team if there is significant change in patient's clinical status   - Transfuse to keep Hgb >7, plts >50  - Hold anticoagulants if  safe to do so per primary team  - If becomes hemodynamically unstable with associated large volume hematochezia, recommend STAT CTA and IR embolization if positive

## 2025-03-28 NOTE — ASSESSMENT & PLAN NOTE
Anemia is likely due to unknown suspect GI. Most recent hemoglobin and hematocrit are listed below.  Recent Labs     03/27/25  1456 03/28/25  0048 03/28/25  0610   HGB 6.8* 9.7* 9.3*   HCT 21.7* 29.7* 28.6*     Plan  - Monitor serial CBC: Every 8 hours  - Transfuse PRBC if patient becomes hemodynamically unstable, symptomatic or H/H drops below 7/21.  - Patient has received 1 units of PRBCs on 3/27  - Patient's anemia is currently stable  - CLD, NPO at mn  - IV Protonix 40mg BID  - IVF hydration   - 2 large bore IV access  - anti-emetics as needed  - GI consulted, appreciate recs   - Plan for EGD and colonoscopy Monday                                                                        - NPO midnight before  - Golytely prep to start at 6pm, to be completed within 4 hours if possible

## 2025-03-28 NOTE — CONSULTS
[facility] - Internal Medicine Telemetry  Wound Care    Patient Name:  Zelalem Gamez   MRN:  [id number]  Date: 3/28/2025  Diagnosis: Symptomatic anemia    History:     Past Medical History:   Diagnosis Date    Arthritis     Epilepsy     Hyperglycemia     Stroke        Social History[1]    Precautions:     Allergies as of 03/27/2025    (No Known Allergies)       WO Assessment Details/Treatment     Wound care consult received for L foot.  Chart reviewed for this encounter.  See flowsheets for findings.    Pt found lying in bed, OK for care at this time. Per pt, he has a chronic wound to his L heel, he follows w/ OP wound care. On assessment, pt w/ dry, crusted wound w/ maroon/purple discoloration to immediate tino-wound. Cleansed w/ NS, MediHoney applied to support autolytic debridement and for antimicrobial properties, heel lift boots in place.   Pt w/ slowly blanchable red discoloration to L lateral foot - monitor for now.    RECOMMENDATIONS:  Q2 days - L heel - cleanse gently w/ NS, pat dry and apply MediHoney to wound bed, cover w/ foam dressing. (MediHoney can be ordered through [facility]).     03/28/25 1150   WOCN Assessment   WOCN Total Time (mins) 15   Visit Date 03/28/25   Visit Time 1150   Consult Type New   WOCN Speciality Wound   Intervention assessed;changed;applied;chart review;coordination of care;orders   Teaching on-going        Wound 03/28/25 1150 Pressure Injury Left Heel   Date First Assessed/Time First Assessed: 03/28/25 1150   Present on Original Admission: Yes  Primary Wound Type: Pressure Injury  Side: Left  Location: Heel   Wound Image    Pressure Injury Stage U   Dressing Appearance Open to air   Drainage Amount None   Drainage Characteristics/Odor No odor   Appearance Dry;Tan;Maroon   Periwound Area Intact;Dry   Care Cleansed with:;Sterile normal saline   Dressing Applied;Honey;Foam   Dressing Change Due 03/30/25          [1]   Social History  Socioeconomic History    Marital status:     Tobacco Use    Smoking status: Every Day     Current packs/day: 1.00     Average packs/day: 1 pack/day for 45.0 years (45.0 ttl pk-yrs)     Types: Cigarettes    Smokeless tobacco: Never   Substance and Sexual Activity    Alcohol use: No    Drug use: No     Social Drivers of Health     Food Insecurity: Patient Unable To Answer (2/22/2025)    Received from [facility]    Hunger Vital Sign     Worried About Running Out of Food in the Last Year: Patient unable to answer     Ran Out of Food in the Last Year: Patient unable to answer   Transportation Needs: Patient Unable To Answer (2/22/2025)    Received from [facility]    PRAPARE - Transportation     Lack of Transportation (Medical): Patient unable to answer     Lack of Transportation (Non-Medical): Patient unable to answer   Housing Stability: Patient Unable To Answer (2/22/2025)    Received from [facility]    Housing Stability Vital Sign     Unable to Pay for Housing in the Last Year: Patient unable to answer     Number of Times Moved in the Last Year: 0     Homeless in the Last Year: Patient unable to answer

## 2025-03-28 NOTE — PROGRESS NOTES
[facility] - Internal Medicine [facility] Medicine  Progress Note    Patient Name: Zelalem Gamez  MRN: [id number]  Patient Class: OP- Observation   Admission Date: 3/27/2025  Length of Stay: 0 days  Attending Physician: Daisy Sandoval MD  Primary Care Provider: Deysi Pink MD        Subjective     Principal Problem:Symptomatic anemia        HPI:  Mr. Zelalem Gamez arpit 74 year old male with a pMHx of FrEF (EF 35%), hypertension, peripheral arterial disease, chronic kidney disease on hemodialysis (MWF), seizure disorder who presentsafter routine labs revealed hemoglobin of 6.6. He is currently residing at a SNF after being discharged from the hospital on 03/07. Patient reports he has been having persistent constipation for several weeks. He has not noticed any blood in his stools, however, he reports that a nurse changes his briefs for him. He has been able to get out of bed with the help of PT but is mostly confined to his bed due to generalized weakness that has been ongoing since he was discharged from the hospital. He also endorses nasal congestion and cough with sputum production for several weeks. He is on 1-2L of O2 at baseline. Patient was admitted from 2/21-03/07 after a syncopal event. He was found to have several issues going on, including acute blood loss anemia requiring 2 units PRBCs. At this time, he also had an ANGEL on CKD along with rhabdomyolysis, so a tunneled catheter was placed and he was started on dialysis. He has been going to dialysis regularly. He denies headache, fever, chills, hemoptysis, decreased appetite, chest pain, abdominal pain, nausea, vomiting, dysuria, hematuria, or lower extremity pain/swelling.      In ED, Pt AFVSS on 1L NC. Hgb 6.6>6.8. No leukocytosis. K 3.4. Cr 3, previously 2.8. Trop 19. CXR: Findings suggestive of pulmonary edema with bilateral pleural effusions, left greater than right. Admitted to  for c/f GIB.     Overview/Hospital Course:  Pt  admitted for evaluation of symptomatic anemia from clinic w/ Hgb 6.6. Pt afebrile and HDS. Started on GIB pathway and transfused 1 unit PRBC. IV protonix. GI consulted and tentatively plan for scope 3/31. CTA c/f stercoral proctitis w/ large stool burden. Discussed w/ CRS, patient stable, will evacuate stool and continue to monitor. ANGEL likely d/t decreased PO intake, s/p IVF. Also w/ b/l pleural effusions was diuresed following transfusion.    Interval History: Pt transfused 1 unit prbc overnight w/ improvement of hgb 9.3. GI tentatively planning for scope 3/31. Discussed diet w/ them, they are agreeable to regular diet until closer to bowel prep and scope. Discussed concerns of stercoral proctitis w/ CRS, will continue to monitor following stool evacuation. ANGEL today w/ Cr 3.2. Will diuresis w/ lasix 40 mg IV x1. Echo showed  There does not appear to be any clear evidence for tamponade, ie no septal bounce, normal left ventricular size, no ight atrial collapse, no respiratory transvalvular variation. However there is a dilated IVC and some right ventricular diastolic collapse which favors tamponade. Since tamponade is a clinical diagnosis and echo findings are only supportive, please correlate clinically. A large left pleural effusion is also present. Cardiology reviewed findings. Currently he has no SOB or chest pain. Will continue to monitor.    Review of Systems   Constitutional:  Negative for activity change, chills, diaphoresis, fatigue and fever.   HENT:  Negative for congestion, rhinorrhea and sore throat.    Respiratory:  Positive for cough (chronic, productive). Negative for chest tightness, shortness of breath and wheezing.    Cardiovascular:  Negative for chest pain, palpitations and leg swelling.   Gastrointestinal:  Positive for abdominal pain and constipation. Negative for abdominal distention, blood in stool, diarrhea, nausea and vomiting.   Genitourinary:  Negative for difficulty urinating,  dysuria, frequency, hematuria and urgency.   Musculoskeletal:  Negative for arthralgias, back pain, gait problem and neck stiffness.   Neurological:  Negative for dizziness, tremors, seizures, syncope, weakness, light-headedness, numbness and headaches.   Psychiatric/Behavioral:  Negative for agitation, confusion and hallucinations.      Objective:     Vital Signs (Most Recent):  Temp: 97.9 °F (36.6 °C) (03/28/25 0739)  Pulse: 66 (03/28/25 0739)  Resp: 18 (03/28/25 0739)  BP: (!) 191/62 (03/28/25 0739)  SpO2: 96 % (03/28/25 0739) Vital Signs (24h Range):  Temp:  [97.4 °F (36.3 °C)-98.1 °F (36.7 °C)] 97.9 °F (36.6 °C)  Pulse:  [60-84] 66  Resp:  [15-18] 18  SpO2:  [72 %-98 %] 96 %  BP: (150-201)/() 191/62     Weight: 45 kg (99 lb 3.3 oz)  Body mass index is 16.01 kg/m².    Intake/Output Summary (Last 24 hours) at 3/28/2025 1054  Last data filed at 3/27/2025 2236  Gross per 24 hour   Intake 432.92 ml   Output --   Net 432.92 ml         Physical Exam  Vitals and nursing note reviewed.   Constitutional:       General: He is not in acute distress.     Appearance: He is cachectic. He is not diaphoretic.   HENT:      Head: Normocephalic and atraumatic.      Right Ear: External ear normal.      Left Ear: External ear normal.      Nose: Nose normal. No congestion.      Mouth/Throat:      Pharynx: Oropharynx is clear.   Eyes:      General: No scleral icterus.     Extraocular Movements: Extraocular movements intact.   Cardiovascular:      Rate and Rhythm: Normal rate and regular rhythm.      Pulses: Normal pulses.      Heart sounds: Normal heart sounds. No murmur heard.  Pulmonary:      Effort: Pulmonary effort is normal. No respiratory distress.      Breath sounds: Normal breath sounds. No wheezing or rales.   Abdominal:      General: Bowel sounds are normal. There is no distension.      Palpations: Abdomen is soft.      Tenderness: There is abdominal tenderness. There is no guarding or rebound.   Musculoskeletal:       Cervical back: Normal range of motion.      Right lower leg: No edema.      Left lower leg: No edema.   Skin:     General: Skin is warm and dry.      Capillary Refill: Capillary refill takes less than 2 seconds.   Neurological:      General: No focal deficit present.      Mental Status: He is alert and oriented to person, place, and time. Mental status is at baseline.   Psychiatric:         Mood and Affect: Mood normal.         Behavior: Behavior normal.         Thought Content: Thought content normal.               Significant Labs: All pertinent labs within the past 24 hours have been reviewed.    Significant Imaging: I have reviewed all pertinent imaging results/findings within the past 24 hours.      Assessment & Plan  Symptomatic anemia  Anemia is likely due to  unknown suspect GI . Most recent hemoglobin and hematocrit are listed below.  Recent Labs     03/27/25  1456 03/28/25  0048 03/28/25  0610   HGB 6.8* 9.7* 9.3*   HCT 21.7* 29.7* 28.6*     Plan  - Monitor serial CBC: Every 8 hours  - Transfuse PRBC if patient becomes hemodynamically unstable, symptomatic or H/H drops below 7/21.  - Patient has received 1 units of PRBCs on 3/27  - Patient's anemia is currently stable  - CLD, NPO at mn  - IV Protonix 40mg BID  - IVF hydration   - 2 large bore IV access  - anti-emetics as needed  - GI consulted, appreciate recs   - Plan for EGD and colonoscopy Monday                                                                        - NPO midnight before  - Golytely prep to start at 6pm, to be completed within 4 hours if possible  Seizure disorder  - continue phenobarbital and zonisamide  Stage 3b chronic kidney disease  ANGEL (acute kidney injury)  Creatine stable for now. BMP reviewed- noted Estimated Creatinine Clearance: 12.7 mL/min (A) (based on SCr of 3.2 mg/dL (H)). according to latest data. Based on current GFR, CKD stage is stage 3 - GFR 30-59.  Monitor UOP and serial BMP and adjust therapy as needed. Renally  dose meds. Avoid nephrotoxic medications and procedures.    ANGEL is likely due to pre-renal azotemia due to dehydration. Baseline creatinine is  2.8 . Most recent creatinine and eGFR are listed below.  Recent Labs     03/27/25  1456 03/28/25  0610   CREATININE 3.0* 3.2*   EGFRNORACEVR 21* 19*      Plan  - ANGEL is worsening. Will adjust treatment as follows: 500 ml NS bolus  - Avoid nephrotoxins and renally dose meds for GFR listed above  - Monitor urine output, serial BMP, and adjust therapy as needed  Generalized weakness  - PTOT    Atrial fibrillation  Patient has paroxysmal (<7 days) atrial fibrillation. Patient is currently in sinus rhythm. [id number] Score: 2. The patients heart rate in the last 24 hours is as follows:  Pulse  Min: 60  Max: 84     Antiarrhythmics  , Daily, Oral  , Daily, Oral  amiodarone tablet 200 mg, Daily, Oral  metoprolol succinate (TOPROL-XL) 24 hr tablet 25 mg, Daily, Oral    Anticoagulants       Plan  - Replete lytes with a goal of K>4, Mg >2  - Patient is not anticoagulated due to risk of bleeding  - Patient's afib is currently controlled    Pleural effusion  Patient found to have small pleural effusion on imaging. I have personally reviewed and interpreted the following imaging: Xray. A thoracentesis was deferred. Most likely etiology includes Congestive Heart Failure. Management to include Diuresis    - lasix 40 mg x1  - echo pending  Stercoral colitis  Constipation  - bowel regimen for stool evacuation and continue monitoring    VTE Risk Mitigation (From admission, onward)           Ordered     Place sequential compression device  Until discontinued         03/27/25 1704     IP VTE HIGH RISK PATIENT  Once         03/27/25 1704     Place sequential compression device  Until discontinued         03/27/25 1704     Reason for no Mechanical VTE Prophylaxis  Once        Question:  Reasons:  Answer:  Risk of Bleeding    03/27/25 1704                    Discharge Planning   МАРИНА: 3/31/2025      Code Status: Full Code   Medical Readiness for Discharge Date:                    Please place Justification for DME        Nestor Snow PA-C  Department of Hospital Medicine   [facility] - Internal Medicine Telemetry

## 2025-03-28 NOTE — ASSESSMENT & PLAN NOTE
Creatine stable for now. BMP reviewed- noted Estimated Creatinine Clearance: 12.7 mL/min (A) (based on SCr of 3.2 mg/dL (H)). according to latest data. Based on current GFR, CKD stage is stage 3 - GFR 30-59.  Monitor UOP and serial BMP and adjust therapy as needed. Renally dose meds. Avoid nephrotoxic medications and procedures.    ANGEL is likely due to pre-renal azotemia due to dehydration. Baseline creatinine is 2.8. Most recent creatinine and eGFR are listed below.  Recent Labs     03/27/25  1456 03/28/25  0610   CREATININE 3.0* 3.2*   EGFRNORACEVR 21* 19*      Plan  - ANGEL is worsening. Will adjust treatment as follows: 500 ml NS bolus  - Avoid nephrotoxins and renally dose meds for GFR listed above  - Monitor urine output, serial BMP, and adjust therapy as needed

## 2025-03-28 NOTE — SUBJECTIVE & OBJECTIVE
Past Medical History:   Diagnosis Date    Arthritis     Epilepsy     Hyperglycemia     Stroke        Past Surgical History:   Procedure Laterality Date    CHOLECYSTECTOMY      JOINT REPLACEMENT      SKIN CANCER EXCISION         Review of patient's allergies indicates:  No Known Allergies  Family History       Problem Relation (Age of Onset)    Diabetes Mother          Tobacco Use    Smoking status: Every Day     Current packs/day: 1.00     Average packs/day: 1 pack/day for 45.0 years (45.0 ttl pk-yrs)     Types: Cigarettes    Smokeless tobacco: Never   Substance and Sexual Activity    Alcohol use: No    Drug use: No    Sexual activity: Not on file     Review of Systems   Constitutional:  Positive for unexpected weight change (weight loss (10-15 lbs)). Negative for appetite change.   Gastrointestinal:  Positive for abdominal pain and constipation. Negative for abdominal distention, anal bleeding, blood in stool and diarrhea.     Objective:     Vital Signs (Most Recent):  Temp: 97.9 °F (36.6 °C) (03/28/25 0739)  Pulse: 66 (03/28/25 0739)  Resp: 18 (03/28/25 0739)  BP: (!) 191/62 (03/28/25 0739)  SpO2: 96 % (03/28/25 0739) Vital Signs (24h Range):  Temp:  [97.4 °F (36.3 °C)-98.1 °F (36.7 °C)] 97.9 °F (36.6 °C)  Pulse:  [60-84] 66  Resp:  [15-18] 18  SpO2:  [72 %-98 %] 96 %  BP: (150-201)/() 191/62     Weight: 45.4 kg (100 lb) (03/27/25 1305)  Body mass index is 16.14 kg/m².      Intake/Output Summary (Last 24 hours) at 3/28/2025 0903  Last data filed at 3/27/2025 2236  Gross per 24 hour   Intake 432.92 ml   Output --   Net 432.92 ml       Lines/Drains/Airways       Peripheral Intravenous Line  Duration                  Peripheral IV - Single Lumen 03/27/25 1452 20 G Right Antecubital <1 day         Peripheral IV - Single Lumen 03/27/25 1740 20 G Distal;Left;Posterior Forearm <1 day                     Physical Exam  Constitutional:       General: He is not in acute distress.     Appearance: He is not  toxic-appearing.      Comments: Cachetic     HENT:      Head: Normocephalic and atraumatic.   Eyes:      General: No scleral icterus.        Right eye: No discharge.         Left eye: No discharge.      Extraocular Movements: Extraocular movements intact.      Conjunctiva/sclera: Conjunctivae normal.      Pupils: Pupils are equal, round, and reactive to light.   Pulmonary:      Effort: Pulmonary effort is normal. No respiratory distress.      Breath sounds: Normal breath sounds.   Abdominal:      General: Abdomen is flat. There is no distension.      Palpations: Abdomen is soft. There is no mass.      Tenderness: There is abdominal tenderness. There is no guarding or rebound.      Hernia: No hernia is present.   Musculoskeletal:      Comments: Muscle wasting - temporal, hand     Skin:     Coloration: Skin is not jaundiced or pale.      Findings: No erythema or rash.   Neurological:      Mental Status: He is alert and oriented to person, place, and time.   Psychiatric:         Mood and Affect: Mood normal.         Behavior: Behavior normal.         Thought Content: Thought content normal.         Judgment: Judgment normal.          Significant Labs:  CBC:   Recent Labs   Lab 03/27/25  1456 03/28/25  0048 03/28/25  0610   WBC 9.95 11.14 11.99   HGB 6.8* 9.7* 9.3*   HCT 21.7* 29.7* 28.6*    289 259     CMP:   Recent Labs   Lab 03/27/25  1456 03/28/25  0610   CALCIUM 8.2* 7.9*   ALBUMIN 2.0*  --     136   K 3.4* 3.7   CO2 29 22*    103   BUN 25* 27*   CREATININE 3.0* 3.2*   ALKPHOS 45  --    ALT 11  --    AST 21  --    BILITOT 0.2  --        Significant Imaging:  Imaging Results               CT Abdomen Pelvis  Without Contrast (Final result)  Result time 03/27/25 17:07:23      Final result by Az Villatoro MD (03/27/25 17:07:23)                   Impression:      This report was flagged in Epic as abnormal.    1. Large amount of stool within the rectum noting rectal wall thickening.  Stercoral  proctitis or other infectious or inflammatory proctitis are considerations.  Correlation with any history of constipation or impaction.  2. Colonic diverticulosis without convincing findings to suggest diverticulitis.  3. Moderate bilateral pleural effusions with associated pericardial effusion suggest volume overload.  There is pulmonary edema.  4. Bilateral nonobstructive nephrolithiasis/renal vascular calcification.  5. Please see above for several additional findings.      Electronically signed by: Az Villatoro MD  Date:    03/27/2025  Time:    17:07               Narrative:    EXAMINATION:  CT ABDOMEN PELVIS WITHOUT CONTRAST    CLINICAL HISTORY:  GI bleed;Unable to get CTA due to ANGEL;    TECHNIQUE:  Low dose axial images, sagittal and coronal reformations were obtained from the lung bases to the pubic symphysis.  Oral contrast was not administered.    COMPARISON:  Radiograph 11/03/2010    FINDINGS:  Images of the lower thorax are remarkable for moderate bilateral pleural effusions with associated compressive atelectasis of the bilateral lower lobes.  Scattered interlobular septal thickening suggests superimposed edema.  The heart is not enlarged noting mild to moderate pericardial effusion.    Evaluation of the abdomen and pelvis is somewhat limited secondary to motion artifact and artifact from patient's arms within the gantry.  Allowing for the above, the liver, spleen, pancreas and adrenal glands have a grossly unremarkable noncontrast appearance.  The gallbladder is surgically absent.  The common duct is prominent status post cholecystectomy.  The stomach is decompressed without wall thickening.  No significant abdominal lymphadenopathy.    There is bilateral nonobstructive nephrolithiasis/renal vascular calcification.  No hydronephrosis.  A cyst arises from the posterior aspect of the interpolar region of the left kidney measuring 1.3 cm.  An additional cyst arises from the lower pole of the right  kidney measuring 2 cm.  The bilateral ureters are unable to be followed in their entirety to the urinary bladder, no definite calculi seen.  No secondary findings to suggest obstructive uropathy.  The urinary bladder is mildly distended, there may be residual wall thickening.  Evaluation of the lower pelvis is significantly limited secondary to extensive artifact from left hip arthroplasty.  The prostate does not appear to be significantly enlarged.    There is a large amount of stool in the rectum noting rectal wall thickening, findings may reflect stercoral proctitis.  Correlation with any history of constipation or impaction.  There are few scattered colonic diverticula without convincing inflammation to suggest diverticulitis.  The terminal ileum is grossly unremarkable.  The appendix is not confidently identified, no pericecal inflammation.  The small bowel is grossly unremarkable.  There are a few scattered shotty periaortic, pericaval, and mesenteric lymph nodes.  There is surgical change within the abdomen, may reflect prior small bowel resection.    There is osteopenia.  There are degenerative changes of the spine.  Left hip arthroplasty appears intact.  No significant inguinal lymphadenopathy.                                       X-Ray Chest AP Portable (Final result)  Result time 03/27/25 16:04:47      Final result by Abdirashid Trinidad MD (03/27/25 16:04:47)                   Impression:      Findings suggestive of pulmonary edema with bilateral pleural effusions, left greater than right      Electronically signed by: Abdirashid Trinidad MD  Date:    03/27/2025  Time:    16:04               Narrative:    EXAMINATION:  XR CHEST AP PORTABLE    CLINICAL HISTORY:  Cough, unspecified    TECHNIQUE:  Single views of the chest were performed.    COMPARISON:  Chest radiograph dated October 8, 2021    FINDINGS:  Right IJ approach tunneled hemodialysis catheter is noted, tip projecting over the distal SVC.  The trachea and  cardiomediastinal silhouette remains stable.  There is increased perihilar, interstitial and bibasilar opacities suggestive of pulmonary edema with bilateral pleural effusions (left greater than right).  No evidence for large pneumothorax.  Osseous structures demonstrate no evidence for acute fractures or dislocations.

## 2025-03-28 NOTE — PLAN OF CARE
Problem: Occupational Therapy  Goal: Occupational Therapy Goal  Description: Goals to be met by: 04/28/2025     Patient will increase functional independence with ADLs by performing:    UE Dressing with Supervision.  LE Dressing with Stand-by Assistance.  Grooming while seated with Set-up Assistance.  Toileting from bedside commode with Contact Guard Assistance for hygiene and clothing management.   Supine to sit with [place].  Stand pivot transfer with Contact Guard Assistance  Toilet transfer to bedside commode with Contact Guard Assistance.    Outcome: Progressing     OT eval complete & goals established.

## 2025-03-28 NOTE — HOSPITAL COURSE
Pt admitted for evaluation of symptomatic anemia from clinic w/ Hgb 6.6. Pt afebrile and HDS. Started on GIB pathway and transfused 1 unit PRBC. IV protonix. GI consulted and tentatively plan for scope 3/31. CTA c/f stercoral proctitis w/ large stool burden. Discussed w/ CRS, patient stable, will evacuate stool and continue to monitor. Cr at baseline. Also w/ b/l pleural effusions was diuresed following transfusion.       Previous hospital management team hospital course above. I (Destiny Machado PA-C with Dr. Ramon Bond) took over this patient's care on 04/03/2025. Per chart review admitted for acute anemia with hgb 6.6. given 1 unit pRBC with improvement. GI consulted, inpatient EGD performed with Schatzki's ring but otherwise normal stomach/esophagus. Recommended outpatient colonoscopy. Hemoglobin remained at baseline, however, patient was noted to have worsening oxygen requirement and increasing creatinine (3.0 >> 4.1) over 5-6 days. Given 40 mg IV lasix but creatinine continued to worsen, per documentation there was some concern for pre-renal azotemia and patient was given a small fluid bolus without improvement. Repeat chest CT 04/02 revealed Findings of CHF and volume overload with cardiomegaly, moderate pericardial effusion, bilateral pleural effusions and superimposed pulmonary edema. At the time of my assessment patient expressed to me that he was receiving HD at the SNF every MWF but had not been dialyzed this admission. Nephrology consulted for volume management inpatient. Removed 1.7 L during dialysis, creatinine improved to 2.2 and patient able to be weaned down to 3L (baseline 1-2L). Discussed with nephrology, no plans for serial HD sessions - patient cleared to return to normal outpatient schedule. Patient was very eager to return to his SNF to continue his therapy and was very agreeable to return to his normal HD schedule there.     Referral to outpatient GI sent to arrange further  colonoscopy/further monitoring of anemia.     On day of discharge patient's vital signs were stable and patient appeared clinically ready for discharge. Hospital course, discharge plan and return precautions discussed - patient expressed understanding. All questions answered at that time.     Physical Exam  Vitals and nursing note reviewed.   Constitutional:       General: He is not in acute distress.     Appearance: He is well-developed. He is cachectic. He is ill-appearing (chronically).   HENT:      Head: Normocephalic and atraumatic.   Eyes:      Extraocular Movements: Extraocular movements intact.   Neck:      Vascular: JVD present.   Cardiovascular:      Rate and Rhythm: Normal rate and regular rhythm.      Heart sounds: Normal heart sounds.      Comments: Pitting edema to bilateral feet - improving   Pulmonary:      Effort: Pulmonary effort is normal. No respiratory distress. On 3L NC     Breath sounds: Rales present.      Comments: Shallow inspiratory effort, bibasilar rales - improved from prior  Chest:      Comments: TDC in place to right chest wall  Abdominal:      General: There is no distension.   Musculoskeletal:         General: No tenderness. Normal range of motion.      Right lower le+ Pitting Edema present.      Left lower le+ Pitting Edema present.   Skin:     General: Skin is warm and dry.      Findings: No rash.   Neurological:      Mental Status: He is alert and oriented to person, place, and time.   Psychiatric:         Behavior: Behavior normal.         Thought Content: Thought content normal.         Judgment: Judgment normal.

## 2025-03-28 NOTE — PLAN OF CARE
"[facility] - Internal Medicine Telemetry  Initial Discharge Assessment       Primary Care Provider: Deysi Pink MD    Admission Diagnosis: Cough [R05.9]  Hypervolemia [E87.70]  Generalized weakness [R53.1]  Chest pain [R07.9]    Admission Date: 3/27/2025  Expected Discharge Date: 3/31/2025    Transition of Care Barriers: (P) None    Payor: MEDICARE / Plan: MEDICARE PART A & B / Product Type: Government /     Extended Emergency Contact Information  Primary Emergency Contact: Karla Gamez  Address: [address] [place] [address] [place] [address] [facility]  Home Phone: [phone]  Relation: Spouse  Secondary Emergency Contact: Krystle Honeycutt  Mobile Phone: [phone]  Relation: Sister    Discharge Plan A: (P) Skilled Nursing Facility  Discharge Plan B: (P) Home Health, Home with family      [id number] [place] [facility] [address] [facility] [place] [address]  Phone: [phone] Fax: [phone]      Initial Assessment (most recent)       Adult Discharge Assessment - 03/28/25 1526          Discharge Assessment    Assessment Type Discharge Planning Assessment     Confirmed/corrected address, phone number and insurance Yes     Confirmed Demographics Correct on Facesheet     Source of Information family     If unable to respond/provide information was family/caregiver contacted? Yes     Contact Name/Number Karla Gamez (Spouse)  [phone]     When was your last doctors appointment? -- (P)    per spouse "no idea"    Communicated МАРИНА with patient/caregiver Yes     People in Home spouse     Facility Arrived From: [facility]     Do you expect to return to your current living situation? Yes     Walking or Climbing Stairs Difficulty yes (P)      Walking or Climbing Stairs ambulation difficulty, requires equipment (P)      Mobility Management transfer chair (P)      Readmission within 30 days? No (P)      Patient " currently being followed by outpatient case management? No (P)      Do you take prescription medications? Yes (P)      Do you have prescription coverage? Yes (P)      Coverage MEDICARE - MEDICARE PART A & B - (P)      Do you have any problems affording any of your prescribed medications? No (P)      Is the patient taking medications as prescribed? yes (P)      How do you get to doctors appointments? family or friend will provide (P)      Are you on dialysis? Yes (P)      Dialysis Name and Scheduled days MWF (P)      Discharge Plan A Skilled Nursing Facility (P)      Discharge Plan B Home Health;Home with family (P)      DME Needed Upon Discharge  none (P)      Discharge Plan discussed with: Sibling;Spouse/sig other (P)      Name(s) and Number(s) Karla Gamez (Spouse)  [phone] and sister Krystle (P)      Transition of Care Barriers None (P)         Financial Resource Strain    How hard is it for you to pay for the very basics like food, housing, medical care, and heating? Not very hard (P)         Housing Stability    In the last 12 months, was there a time when you were not able to pay the mortgage or rent on time? No (P)      At any time in the past 12 months, were you homeless or living in a shelter (including now)? No (P)         Transportation Needs    In the past 12 months, has lack of transportation kept you from medical appointments or from getting medications? No (P)      In the past 12 months, has lack of transportation kept you from meetings, work, or from getting things needed for daily living? No (P)         Food Insecurity    Within the past 12 months, you worried that your food would run out before you got the money to buy more. Never true (P)      Within the past 12 months, the food you bought just didn't last and you didn't have money to get more. Never true (P)         Alcohol Use    Q1: How often do you have a drink containing alcohol? Never (P)      Q2: How many drinks containing alcohol do you  have on a typical day when you are drinking? Patient does not drink (P)      Q3: How often do you have six or more drinks on one occasion? Never (P)         Utilities    In the past 12 months has the electric, gas, oil, or water company threatened to shut off services in your home? No (P)         Health Literacy    How often do you need to have someone help you when you read instructions, pamphlets, or other written material from your doctor or pharmacy? Sometimes (P)         OTHER    Name(s) of People in Home lives w/ spouse when not at SNF (P)                  Discharge Plan A and Plan B have been determined by review of patient's clinical status, future medical and therapeutic needs, and coverage/benefits for post-acute care in coordination with multidisciplinary team members.                   AAYUSH Tidwell, LMSW  [facility]  Case Management  Ext. [phone]

## 2025-03-28 NOTE — ED NOTES
Pt care assumed. Report received by MIKKI Tony. Pt lying in stretcher in low and locked position and side rails raised x2. Call light, pt's belongings, and bedside table within pt's reach. Pt on continuous cardiac monitoring, pulse oximetry, and BP cycling every 4 hrs. Pt in NAD and verbalized no needs at this time.

## 2025-03-28 NOTE — ANESTHESIA PREPROCEDURE EVALUATION
[facility]  Anesthesia Pre-Operative Evaluation      PAPER consent placed in patient's chart.     Patient Name: Zelalem Gamez  YOB: 1950  MRN: [id number]    SUBJECTIVE:     Pre-operative evaluation for Procedure(s) (LRB):  EGD (ESOPHAGOGASTRODUODENOSCOPY) (N/A)  COLONOSCOPY (N/A)     03/28/2025    Zelalem Gamez is a 74 y.o. male w/ a significant PMHx of afib, HFrEF (EF 45%), HTN, PAD, recent ANGEL on CKD s3 (on HD), prior CVA (2006), seizures, smoking (1 PPD, smoking since he was 13), and malnutrition. Recently hospitalized 02/21 - 03/07 following a syncopal event for rhabdomyolysis. Also found to have anemia requiring transfusion of 2 pRBCs. Hospitalization c/b ANGEL on CKD requiring HD, has continued HD at [facility]. Presented to [facility] 03/27 for Hgb 6.6 found on routine labs and admitted for c/f GIB. CXR also notable for pulmonary edema w/ b/l pleural effusions. CT A/P also noted a pericardial effusion.    Currently on 4L O2 NC, not on home O2.    Patient now presents for the above procedure(s).    Echo (03/28/25):    Left Ventricle: The left ventricle is normal in size. Normal wall thickness. Mild global hypokinesis present. There is mildly reduced systolic function. Ejection fraction is approximately 45%. There is indeterminate diastolic function.    Right Ventricle: The right ventricle is normal in size. Systolic function is normal.    Left Atrium: Severely dilated measuring 53 mL/m2    Tricuspid Valve: There is mild regurgitation.    Pulmonary Artery: There is pulmonary hypertension. The estimated pulmonary artery systolic pressure is 64 mmHg.    IVC/SVC: Elevated venous pressure at 15 mmHg.    Pericardium: There does not appear to be any clear evidence for tamponade, ie no septal bounce, normal left ventricular size, no ight atrial collapse, no respiratory transvalvular variation. However there is a dilated IVC and some right ventricular diastolic collapse which favors tamponade.  Since tamponade is a clinical diagnosis and echo findings are only supportive, please correlate clinically. A large left pleural effusion is also present.    LDA:        Peripheral IV - Single Lumen 03/27/25 1452 20 G Right Antecubital (Active)   Site Assessment Clean;Dry;Intact 03/28/25 0350   Extremity Assessment Distal to IV No warmth;No swelling;No redness;No abnormal discoloration 03/27/25 1452   Line Status Saline locked 03/28/25 0350   Dressing Status Clean;Dry;Intact 03/28/25 0350   Dressing Intervention Integrity maintained 03/28/25 0350   Number of days: 0            Peripheral IV - Single Lumen 03/27/25 1740 20 G Distal;Left;Posterior Forearm (Active)   Site Assessment Clean;Dry;Intact 03/28/25 0350   Line Status Saline locked 03/28/25 0350   Dressing Status Clean;Dry;Intact 03/28/25 0350   Dressing Intervention Integrity maintained 03/28/25 0350   Number of days: 0       Prev airway: None documented.    Drips: None documented.    Problem List[1]    Review of patient's allergies indicates:  No Known Allergies    Current Outpatient Medications:  Current Medications[2]    Past Surgical History:   Procedure Laterality Date    CHOLECYSTECTOMY      JOINT REPLACEMENT      SKIN CANCER EXCISION         Social History[3]    OBJECTIVE:     Vital Signs Range (Last 24H):  Temp:  [36.3 °C (97.4 °F)-36.7 °C (98.1 °F)]   Pulse:  [60-84]   Resp:  [15-18]   BP: (150-201)/()   SpO2:  [72 %-98 %]       Significant Labs:  Lab Results   Component Value Date    WBC 11.99 03/28/2025    HGB 9.3 (L) 03/28/2025    HCT 28.6 (L) 03/28/2025     03/28/2025    CHOL 147 02/23/2025    TRIG 106 02/23/2025    HDL 57 02/23/2025    ALT 11 03/27/2025    AST 21 03/27/2025     03/28/2025    K 3.7 03/28/2025     03/28/2025    CREATININE 3.2 (H) 03/28/2025    BUN 27 (H) 03/28/2025    CO2 22 (L) 03/28/2025    TSH 3.16 02/23/2025    INR 1.1 03/05/2025    HGBA1C 5.0 05/03/2022       Diagnostic Studies: No relevant  studies.    EKG:   Results for orders placed or performed during the hospital encounter of 03/27/25   EKG 12-lead    Collection Time: 03/27/25  3:13 PM   Result Value Ref Range    QRS Duration 82 ms    [facility] QTC Calculation 503 ms    Narrative    Test Reason : R53.1,    Vent. Rate :  62 BPM     Atrial Rate :  62 BPM     P-R Int : 170 ms          QRS Dur :  82 ms      QT Int : 496 ms       P-R-T Axes :  72 -89 -75 degrees    QTcB Int : 503 ms    Normal sinus rhythm  Left axis deviation  ST and T wave abnormality, consider inferior ischemia  ST and T wave abnormality, consider anterolateral ischemia  Abnormal ECG  When compared with ECG of 08-Oct-2021 19:41,  The axis Shifted left  T wave inversion now evident in Inferior leads  T wave inversion now evident in Anterior-lateral leads  QT has lengthened  Confirmed by García Faith ([id number]) on 3/27/2025 3:22:20 PM    Referred By: AAAREFERRAL SELF           Confirmed By: García Faith       2D ECHO:  TTE:  Results for orders placed or performed during the hospital encounter of 03/27/25   Echo   Result Value Ref Range    LV Diastolic Volume 89 mL    Echo EF Estimated 60 %    LV Systolic Volume 35 mL    IVS 0.9 0.6 - 1.1 cm    LVIDd 4.4 3.5 - 6.0 cm    LVIDs 3.0 2.1 - 4.0 cm    LVOT diameter 2.0 cm    PW 0.8 0.6 - 1.1 cm    AV LVOT peak gradient 3 mmHg    LVOT mn grad 1 mmHg    LVOT peak liborio 0.8 m/s    LVOT peak VTI 20.6 cm    RV- plummer basal diam 3.2 cm    LA size 3.8 cm    Left Atrium Major Axis 5.7 cm    Left Atrium Minor Axis 6.3 cm    LA Vol (MOD) 79 mL    RA Major [place] 4.67 cm    AV valve area 3.0 cm2    AV area by cont VTI 3.0 cm2    AV peak gradient 3 mmHg    AV mean gradient 2 mmHg    Ao peak liborio 0.9 m/s    Ao VTI 21.7 cm    MV Peak A Liborio 0.72 m/s    E wave deceleration time 114 ms    MV Peak E Liborio 1.05 m/s    E/A ratio 1.46     LV LATERAL E/E' RATIO 10.5     LV SEPTAL E/E' RATIO 15.0     TDI LATERAL 0.10 m/s    TDI SEPTAL 0.07 m/s    TV peak gradient 48 mmHg    TR  "Max Gerardo 3.5 m/s    Ascending aorta 2.62 cm    STJ 2.40 cm    P vein A 0.3 m/s    MV "A" wave duration 114.18 ms    Pulm vein "A" wave 114.18 ms    PV Peak D Gerardo 0.76 m/s    PV Peak S Gerardo 0.80 m/s    Sinus 2.96 cm    LA WIDTH 4.4 cm    RA Width 3.83 cm    TAPSE 1.60 cm    BSA 1.45 m2    LVOT stroke volume 64.7 cm3    LV Systolic Volume Index 23.6 mL/m2    LV Diastolic Volume Index 60.14 mL/m2    LVOT area 3.1 cm2    FS 31.8 28 - 44 %    Left Ventricle Relative Wall Thickness 0.36 cm    LV mass 118.6 g    LV Mass Index 80.1 g/m2    E/E' ratio 12 m/s    MADDY 57 mL/m2    LA Vol 85 cm3    Pulm vein S/D ratio 1.05     RV/LV Ratio 0.73 cm    MADDY (MOD) 53 mL/m2    AV Velocity Ratio 0.89     AV index (prosthetic) 0.95     DB by Velocity Ratio 2.8 cm²    Mean e' 0.09 m/s    ZLVIDS 0.72     ZLVIDD -0.03     TV resting pulmonary artery pressure 64 mmHg    RV TB RVSP 19 mmHg    Est. RA pres 15 mmHg    EF 45 %    Narrative      Left Ventricle: The left ventricle is normal in size. Normal wall   thickness. Mild global hypokinesis present. There is mildly reduced   systolic function. Ejection fraction is approximately 45%. There is   indeterminate diastolic function.    Right Ventricle: The right ventricle is normal in size. Systolic   function is normal.    Left Atrium: Severely dilated measuring 53 mL/m2    Tricuspid Valve: There is mild regurgitation.    Pulmonary Artery: There is pulmonary hypertension. The estimated   pulmonary artery systolic pressure is 64 mmHg.    IVC/SVC: Elevated venous pressure at 15 mmHg.    Pericardium: There does not appear to be any clear evidence for   tamponade, ie no septal bounce, normal left ventricular size, no ight   atrial collapse, no respiratory transvalvular variation. However there is   a dilated IVC and some right ventricular diastolic collapse which favors   tamponade. Since tamponade is a clinical diagnosis and echo findings are   only supportive, please correlate clinically. A large " left pleural   effusion is also present.         JEZ:  No results found for this or any previous visit.    ASSESSMENT/PLAN:           Pre-op Assessment    I have reviewed the Patient Summary Reports.     I have reviewed the Nursing Notes.    I have reviewed the Medications.     Review of Systems  Anesthesia Hx:   History of prior surgery of interest to airway management or planning:            Denies Personal Hx of Anesthesia complications.                    Social:  Smoker       Hematology/Oncology:       -- Anemia:                                  Cardiovascular:     Hypertension    Dysrhythmias atrial fibrillation  CHF   PVD                                Renal/:  Chronic Renal Disease, Dialysis                Neurological:   CVA    Seizures                                    Physical Exam  General: Well nourished, Cooperative, Alert and Oriented    Airway:  Mallampati: II / I  Mouth Opening: Normal  TM Distance: Normal  Tongue: Normal  Neck ROM: Normal ROM    Dental:  Edentulous, Dentures    Chest/Lungs:  Normal Respiratory Rate  4L NC  Heart:  Rate: Normal        Anesthesia Plan  Type of Anesthesia, risks & benefits discussed:    Anesthesia Type: Gen Natural Airway, MAC, Gen ETT  Intra-op Monitoring Plan: Standard ASA Monitors  Post Op Pain Control Plan: multimodal analgesia and IV/PO Opioids PRN  Induction:  IV  Informed Consent: Informed consent signed with the Patient and all parties understand the risks and agree with anesthesia plan.  All questions answered.   ASA Score: 3  Day of Surgery Review of History & Physical: H&P Update referred to the surgeon/provider.    Ready For Surgery From Anesthesia Perspective.     .           [1]   Patient Active Problem List  Diagnosis    Periprosthetic fracture around internal prosthetic left hip joint    Seizure disorder    Stage 3b chronic kidney disease    Underweight    Periprosthetic fracture around internal prosthetic hip joint    Severe malnutrition     Generalized weakness    Symptomatic anemia    Atrial fibrillation    Pleural effusion   [2]   Current Facility-Administered Medications:     0.9%  NaCl infusion (for blood administration), , Intravenous, Q24H PRN, Nasir Tapia PA-C    acetaminophen tablet 650 mg, 650 mg, Oral, Q8H PRN, Nestor Snow PA-C    amiodarone tablet 200 mg, 200 mg, Oral, Daily, Nestor Snow PA-C, 200 mg at 03/28/25 0903    ARIPiprazole tablet 2 mg, 2 mg, Oral, Daily, Nestor Snow PA-LIZZY, 2 mg at 03/28/25 0903    ascorbic acid (vitamin C) tablet 250 mg, 250 mg, Oral, Daily, Nestor Snow PA-LIZZY, 250 mg at 03/28/25 0903    dextrose 50% injection 12.5 g, 12.5 g, Intravenous, PRN, Nestor Snow PA-LIZZY    dextrose 50% injection 25 g, 25 g, Intravenous, PRN, Nestor Snow PA-C    ferrous sulfate tablet 1 each, 1 tablet, Oral, Daily, Nestor Snow PA-C    glucagon (human recombinant) injection 1 mg, 1 mg, Intramuscular, PRN, Nestor Snow PA-C    glucose chewable tablet 16 g, 16 g, Oral, PRN, Nestor Snow PA-LIZZY    glucose chewable tablet 24 g, 24 g, Oral, PRN, Nestor Snow PA-C    glycerin 99.5% topical solution 100 mL, 100 mL, Rectal, ED 1 Time **AND** magnesium citrate solution 296 mL, 296 mL, Rectal, ED 1 Time **AND** sodium chloride 0.9% irrigation 500 mL, 500 mL, Rectal, ED 1 Time, Nesotr Snow PA-C    melatonin tablet 6 mg, 6 mg, Oral, Nightly PRN, Nestor Snow PA-C    metoprolol succinate (TOPROL-XL) 24 hr tablet 25 mg, 25 mg, Oral, Daily, Nestor Snow PA-C, 25 mg at 03/28/25 0903    naloxone 0.4 mg/mL injection 0.02 mg, 0.02 mg, Intravenous, PRN, Nestor Snow PA-C    ondansetron disintegrating tablet 8 mg, 8 mg, Oral, Q8H PRN, Nestor Snow PA-C    pantoprazole injection 40 mg, 40 mg, Intravenous, BID, Nestor Snow PA-C, 40 mg at 03/28/25 0904    PHENobarbitaL tablet 32.4 mg, 32.4 mg, Oral,  Daily, Nestor Snow, PA-C, 32.4 mg at 03/28/25 0903    [START ON 3/30/2025] polyethylene glycol (GoLYTELY) solution, 4,000 mL, Oral, Once, Radames Vasquez MD    polyethylene glycol packet 17 g, 17 g, Oral, Daily, Nestor Snow, PA-C, 17 g at 03/28/25 0903    prochlorperazine injection Soln 5 mg, 5 mg, Intravenous, Q6H PRN, Nestor Snow, PA-C    senna tablet 8.6 mg, 8.6 mg, Oral, Daily, Nestor Snow, PA-C, 8.6 mg at 03/27/25 1749    sevelamer carbonate tablet 800 mg, 800 mg, Oral, TID WM, Nestor Snow, PA-C, 800 mg at 03/27/25 1749    sodium chloride 0.9% bolus 500 mL 500 mL, 500 mL, Intravenous, Once, Nestor Snow PA-LIZZY    vitamin D 1000 units tablet 2,000 Units, 2,000 Units, Oral, Daily, Nestor Snow, PA-C, 2,000 Units at 03/28/25 0903    zonisamide capsule 100 mg, 100 mg, Oral, Daily, Nestor Snow PA-C  [3]   Social History  Socioeconomic History    Marital status:    Tobacco Use    Smoking status: Every Day     Current packs/day: 1.00     Average packs/day: 1 pack/day for 45.0 years (45.0 ttl pk-yrs)     Types: Cigarettes    Smokeless tobacco: Never   Substance and Sexual Activity    Alcohol use: No    Drug use: No     Social Drivers of Health     Food Insecurity: Patient Unable To Answer (2/22/2025)    Received from [facility]    Hunger Vital Sign     Worried About Running Out of Food in the Last Year: Patient unable to answer     Ran Out of Food in the Last Year: Patient unable to answer   Transportation Needs: Patient Unable To Answer (2/22/2025)    Received from [facility]    PRAPARE - Transportation     Lack of Transportation (Medical): Patient unable to answer     Lack of Transportation (Non-Medical): Patient unable to answer   Housing Stability: Patient Unable To Answer (2/22/2025)    Received from [facility]    Housing Stability Vital Sign     Unable to Pay for Housing in the Last Year: Patient unable to answer      Number of Times Moved in the Last Year: 0     Homeless in the Last Year: Patient unable to answer

## 2025-03-28 NOTE — CONSULTS
[facility] - Internal Medicine Telemetry  Gastroenterology  Consult Note    Patient Name: Zelalem Gamez  MRN: [id number]  Admission Date: 3/27/2025  Hospital Length of Stay: 0 days  Code Status: Full Code   Attending Provider: Daisy Sandoval MD   Consulting Provider: Jason Hernandez MD  Primary Care Physician: Deysi Pink MD  Principal Problem:Symptomatic anemia    Inpatient consult to Gastroenterology  Consult performed by: Jason Hernandez MD  Consult ordered by: Nestor Snow PA-C        Subjective:     HPI:  Zelalem Gamez is a 74 year old male with a PMHx of HFrEF (EF 35%), hypertension, peripheral arterial disease, chronic kidney disease on hemodialysis (MWF), seizure disorder [place] [facility] [place] labs revealed Hgb 6.6. He does report constipation, abdominal pain and unintended weight loss (approx. 10-15 lbs) as of late but does report good appetite.  During this admission, he has received 1U pRBc with repeat Hgb 9.3. He denies fever, chills, weakness, fatigue, diarrhea, any bleeding - hematemesis, hemoptysis, hematochezia, and melena. No prior history of EGD past, colonoscopy (?1998), he is unsure of the results of the colonoscopy. CT abd pelvis with findings concerning for large amount of stool within the rectum noting rectal wall thickening with possible consideration of infectious/inflammatory processes. Colonic diverticulosis without convincing findings to suggest diverticulitis.Moderate bilateral pleural effusions with associated pericardial effusion suggest volume overload    He was recently admitted to [facility] 2/21-3/7 for syncopal event and admitted to ICU for management of UTI, acute kidney injury, rhabdomyolysis, acute hypoxic respiratory failure 2/2 to volume overload, atrial fibrillation with RVR.  During the admission, he was notable for anemia requiring 2U pRBC and was evaluated by GI but no intervention was needed at the time.     GI consulted for GI bleed.       Past Medical  History:   Diagnosis Date    Arthritis     Epilepsy     Hyperglycemia     Stroke        Past Surgical History:   Procedure Laterality Date    CHOLECYSTECTOMY      JOINT REPLACEMENT      SKIN CANCER EXCISION         Review of patient's allergies indicates:  No Known Allergies  Family History       Problem Relation (Age of Onset)    Diabetes Mother          Tobacco Use    Smoking status: Every Day     Current packs/day: 1.00     Average packs/day: 1 pack/day for 45.0 years (45.0 ttl pk-yrs)     Types: Cigarettes    Smokeless tobacco: Never   Substance and Sexual Activity    Alcohol use: No    Drug use: No    Sexual activity: Not on file     Review of Systems   Constitutional:  Positive for unexpected weight change (weight loss (10-15 lbs)). Negative for appetite change.   Gastrointestinal:  Positive for abdominal pain and constipation. Negative for abdominal distention, anal bleeding, blood in stool and diarrhea.     Objective:     Vital Signs (Most Recent):  Temp: 97.9 °F (36.6 °C) (03/28/25 0739)  Pulse: 66 (03/28/25 0739)  Resp: 18 (03/28/25 0739)  BP: (!) 191/62 (03/28/25 0739)  SpO2: 96 % (03/28/25 0739) Vital Signs (24h Range):  Temp:  [97.4 °F (36.3 °C)-98.1 °F (36.7 °C)] 97.9 °F (36.6 °C)  Pulse:  [60-84] 66  Resp:  [15-18] 18  SpO2:  [72 %-98 %] 96 %  BP: (150-201)/() 191/62     Weight: 45.4 kg (100 lb) (03/27/25 1305)  Body mass index is 16.14 kg/m².      Intake/Output Summary (Last 24 hours) at 3/28/2025 0903  Last data filed at 3/27/2025 2236  Gross per 24 hour   Intake 432.92 ml   Output --   Net 432.92 ml       Lines/Drains/Airways       Peripheral Intravenous Line  Duration                  Peripheral IV - Single Lumen 03/27/25 1452 20 G Right Antecubital <1 day         Peripheral IV - Single Lumen 03/27/25 1740 20 G Distal;Left;Posterior Forearm <1 day                     Physical Exam  Constitutional:       General: He is not in acute distress.     Appearance: He is not toxic-appearing.       Comments: Cachetic     HENT:      Head: Normocephalic and atraumatic.   Eyes:      General: No scleral icterus.        Right eye: No discharge.         Left eye: No discharge.      Extraocular Movements: Extraocular movements intact.      Conjunctiva/sclera: Conjunctivae normal.      Pupils: Pupils are equal, round, and reactive to light.   Pulmonary:      Effort: Pulmonary effort is normal. No respiratory distress.      Breath sounds: Normal breath sounds.   Abdominal:      General: Abdomen is flat. There is no distension.      Palpations: Abdomen is soft. There is no mass.      Tenderness: There is abdominal tenderness. There is no guarding or rebound.      Hernia: No hernia is present.   Musculoskeletal:      Comments: Muscle wasting - temporal, hand     Skin:     Coloration: Skin is not jaundiced or pale.      Findings: No erythema or rash.   Neurological:      Mental Status: He is alert and oriented to person, place, and time.   Psychiatric:         Mood and Affect: Mood normal.         Behavior: Behavior normal.         Thought Content: Thought content normal.         Judgment: Judgment normal.          Significant Labs:  CBC:   Recent Labs   Lab 03/27/25  1456 03/28/25  0048 03/28/25  0610   WBC 9.95 11.14 11.99   HGB 6.8* 9.7* 9.3*   HCT 21.7* 29.7* 28.6*    289 259     CMP:   Recent Labs   Lab 03/27/25  1456 03/28/25  0610   CALCIUM 8.2* 7.9*   ALBUMIN 2.0*  --     136   K 3.4* 3.7   CO2 29 22*    103   BUN 25* 27*   CREATININE 3.0* 3.2*   ALKPHOS 45  --    ALT 11  --    AST 21  --    BILITOT 0.2  --        Significant Imaging:  Imaging Results               CT Abdomen Pelvis  Without Contrast (Final result)  Result time 03/27/25 17:07:23      Final result by Az Villatoro MD (03/27/25 17:07:23)                   Impression:      This report was flagged in Epic as abnormal.    1. Large amount of stool within the rectum noting rectal wall thickening.  Stercoral proctitis or other  infectious or inflammatory proctitis are considerations.  Correlation with any history of constipation or impaction.  2. Colonic diverticulosis without convincing findings to suggest diverticulitis.  3. Moderate bilateral pleural effusions with associated pericardial effusion suggest volume overload.  There is pulmonary edema.  4. Bilateral nonobstructive nephrolithiasis/renal vascular calcification.  5. Please see above for several additional findings.      Electronically signed by: Az Villatoro MD  Date:    03/27/2025  Time:    17:07               Narrative:    EXAMINATION:  CT ABDOMEN PELVIS WITHOUT CONTRAST    CLINICAL HISTORY:  GI bleed;Unable to get CTA due to ANGEL;    TECHNIQUE:  Low dose axial images, sagittal and coronal reformations were obtained from the lung bases to the pubic symphysis.  Oral contrast was not administered.    COMPARISON:  Radiograph 11/03/2010    FINDINGS:  Images of the lower thorax are remarkable for moderate bilateral pleural effusions with associated compressive atelectasis of the bilateral lower lobes.  Scattered interlobular septal thickening suggests superimposed edema.  The heart is not enlarged noting mild to moderate pericardial effusion.    Evaluation of the abdomen and pelvis is somewhat limited secondary to motion artifact and artifact from patient's arms within the gantry.  Allowing for the above, the liver, spleen, pancreas and adrenal glands have a grossly unremarkable noncontrast appearance.  The gallbladder is surgically absent.  The common duct is prominent status post cholecystectomy.  The stomach is decompressed without wall thickening.  No significant abdominal lymphadenopathy.    There is bilateral nonobstructive nephrolithiasis/renal vascular calcification.  No hydronephrosis.  A cyst arises from the posterior aspect of the interpolar region of the left kidney measuring 1.3 cm.  An additional cyst arises from the lower pole of the right kidney measuring 2 cm.   The bilateral ureters are unable to be followed in their entirety to the urinary bladder, no definite calculi seen.  No secondary findings to suggest obstructive uropathy.  The urinary bladder is mildly distended, there may be residual wall thickening.  Evaluation of the lower pelvis is significantly limited secondary to extensive artifact from left hip arthroplasty.  The prostate does not appear to be significantly enlarged.    There is a large amount of stool in the rectum noting rectal wall thickening, findings may reflect stercoral proctitis.  Correlation with any history of constipation or impaction.  There are few scattered colonic diverticula without convincing inflammation to suggest diverticulitis.  The terminal ileum is grossly unremarkable.  The appendix is not confidently identified, no pericecal inflammation.  The small bowel is grossly unremarkable.  There are a few scattered shotty periaortic, pericaval, and mesenteric lymph nodes.  There is surgical change within the abdomen, may reflect prior small bowel resection.    There is osteopenia.  There are degenerative changes of the spine.  Left hip arthroplasty appears intact.  No significant inguinal lymphadenopathy.                                       X-Ray Chest AP Portable (Final result)  Result time 03/27/25 16:04:47      Final result by Abdirashid Trinidad MD (03/27/25 16:04:47)                   Impression:      Findings suggestive of pulmonary edema with bilateral pleural effusions, left greater than right      Electronically signed by: Abdirashid Trinidad MD  Date:    03/27/2025  Time:    16:04               Narrative:    EXAMINATION:  XR CHEST AP PORTABLE    CLINICAL HISTORY:  Cough, unspecified    TECHNIQUE:  Single views of the chest were performed.    COMPARISON:  Chest radiograph dated October 8, 2021    FINDINGS:  Right IJ approach tunneled hemodialysis catheter is noted, tip projecting over the distal SVC.  The trachea and cardiomediastinal  silhouette remains stable.  There is increased perihilar, interstitial and bibasilar opacities suggestive of pulmonary edema with bilateral pleural effusions (left greater than right).  No evidence for large pneumothorax.  Osseous structures demonstrate no evidence for acute fractures or dislocations.                                     Assessment/Plan:     Oncology  * Symptomatic anemia  74M PMHx CKD on HD (MWF), seizure disorder (on phenobarbital) presents with anemia. Routine labs revealing Hgb 6.6. He describes constipation, abdominal pain and unintended weight loss (approx. 10-15 lbs) but with good appetite.  He responded appropriately to 1U pRBc (repeat Hgb 9.3.) with no reported bleeding events. Prior history of bleeding - No prior history of EGD past, colonoscopy (?1998), he is unsure of the results of the colonoscopy. Recently admitted to [facility] [place]-3/7 for syncopal event and admitted to ICU for management of UTI, acute kidney injury, rhabdomyolysis, acute hypoxic respiratory failure 2/2 to volume overload, atrial fibrillation with RVR.  During the admission, he was notable for anemia requiring 2U pRBC and was evaluated by GI but no intervention was needed at the time.     CT abd pelvis (3/27/25) with findings concerning for large amount of stool within the rectum noting rectal wall thickening with possible consideration of infectious/inflammatory processes. Colonic diverticulosis without convincing findings to suggest diverticulitis.Moderate bilateral pleural effusions with associated pericardial effusion suggest volume overload      Physical exam - notable for cachexia, and abdominal tenderness.   Labs consistent with normocytic anemia; no anemia workup documented     Plan  - Plan for EGD and colonoscopy Monday    - NPO midnight before  - Golytely prep to start at 6pm, to be completed within 4 hours if possible  - Clear liquid diet   - Trend Hgb q24hrs. Transfuse for Hgb <7, unless otherwise indicated  -  Maintain IV access with 2 large bore Ivs  - Intravascular resuscitation/support with IVFs   - Bolus IV pantoprazole 80mg followed by 40mg BID  - Please correct any coagulopathy with platelets and FFP for goal of platelets >50K and INR <2.0  - Please notify GI team if there is significant change in patient's clinical status   - Transfuse to keep Hgb >7, plts >50  - Hold anticoagulants if safe to do so per primary team  - If becomes hemodynamically unstable with associated large volume hematochezia, recommend STAT CTA and IR embolization if positive         Thank you for your consult. I will follow-up with patient. Please contact us if you have any additional questions.    Jason Hernandez MD  Gastroenterology  [facility] - Internal Medicine Telemetry

## 2025-03-28 NOTE — PT/OT/SLP EVAL
Occupational Therapy   Co-Evaluation  Co-treatment performed due to patient's multiple deficits requiring two skilled therapists to appropriately and safely assess patient's strength and endurance while facilitating functional tasks in addition to accommodating for patient's activity tolerance.        Name: Zelalem Gamez  MRN: [id number]  Admitting Diagnosis: Symptomatic anemia  Recent Surgery: Procedure(s) (LRB):  EGD (ESOPHAGOGASTRODUODENOSCOPY) (N/A)  COLONOSCOPY (N/A)      Recommendations:     Discharge Recommendations: Moderate Intensity Therapy  Discharge Equipment Recommendations:  none  Barriers to discharge:   (increased assistance required)    Assessment:     Zelalem Gamez is a 74 y.o. male with a medical diagnosis of Symptomatic anemia.  He presents with the following performance deficits affecting function: weakness, impaired self care skills, impaired functional mobility, impaired balance, decreased coordination, decreased upper extremity function, decreased lower extremity function, decreased ROM, pain, decreased safety awareness, impaired skin.      Pt agreeable to therapy and tolerated fairly. However, pt remains limited in ADLs, functional mobility, and functional transfers. Pt is eager to get OOB and into a wheelchair but appears to lack awareness of his current deficits. Pt is a fall risk at this time due to poor balance and B LE weakness. Pt is unable to extend L hip to place L foot on ground and currently unable to bear weight onto L foot due to pressure sore on heel.     Pt would continue to benefit from skilled OT services to maximize functional independence with ADLs and functional mobility, reduce caregiver burden, and facilitate safe discharge in the least restrictive environment.      Rehab Prognosis: Fair; patient would benefit from acute skilled OT services to address these deficits and reach maximum level of function.       Plan:     Patient to be seen 4 x/week to address the above  "listed problems via self-care/home management, therapeutic activities, therapeutic exercises  Plan of Care Expires: 04/28/25  Plan of Care Reviewed with: patient    Subjective     Chief Complaint: "I want my coffee!"  Patient/Family Comments/goals: "They are limiting me! I want a wheelchair so that I can walk with around here"    Occupational Profile:  Living Environment: pt lives with wife in a H with 2 UBALDO with L HR. Bathroom: tub/shower with grab bar  Previous level of function: mod I per pt report  Roles and Routines: pt worked as  but is now retired.   Equipment Used at Home: walker, rolling, bedside commode, wheelchair (tub transfer bench)  Assistance upon Discharge: limited assistance from his wife    Pain/Comfort:  Pain Rating 1: other (see comments) (did not rate)  Location - Side 1: Left  Location 1: heel  Pain Addressed 1: Distraction, Reposition    Patients cultural, spiritual, Gnosticism conflicts given the current situation: no    Objective:     Communicated with: nurse prior to session.  Patient found HOB elevated with  (no active lines) upon OT entry to room.    General Precautions: Standard, fall  Orthopedic Precautions: N/A  Braces: N/A  Respiratory Status: Nasal cannula    Occupational Performance:    Bed Mobility:    Patient completed Scooting to EOB with stand by assistance    Patient completed Supine to Sit with maximal assistance    Patient completed Sit to Supine with maximal assistance    Functional Mobility/Transfers:  Patient completed Sit <> Stand Transfer with moderate assistance and of 2 persons  with  rolling walker   Pt demonstrated forward trunk posture and did not WB through LLE    Functional Mobility: pt unable to ambulate at this time due to BLE weakness and ROM deficits  At this time, pt is unsafe to transfer into bedside chair due to impulsivity and lack of awareness of physical deficits    Activities of Daily Living:  Lower Body Dressing: total assistance to dof/don " socks while supine in bed with HOB elevated    Cognitive/Visual Perceptual:  Cognitive/Psychosocial Skills:     -       Follows Commands/attention:Follows multistep  commands    Physical Exam:  Upper Extremity Range of Motion:     -       Right Upper Extremity: WFL  -       Left Upper Extremity: WFL  Upper Extremity Strength:    -       Right Upper Extremity: WFL, however, bicep/tricip strength not assessed due to recent clavicle injury   -       Left Upper Extremity: WFL, however, bicep/tricip strength not assessed due to recent clavicle injury    Strength:    -       Right Upper Extremity: WFL  -       Left Upper Extremity: WFL  Fine Motor Coordination:    -       Intact  Left hand thumb/finger opposition skills and Right hand thumb/finger opposition skills  Gross motor coordination:   WFL    AMPAC 6 Click ADL:  AMPAC Total Score: 17    Treatment & Education:  -Education on task modification to maximize safety and (I) during bed mobility, ADLs and mobility/transfers  -Education on importance of OOB activity to maintain/improve overall strength activity tolerance and promote recovery  -Pt educated to call for assistance and to transfer with hospital staff only  -Provided education regarding role of OT & POC with pt verbalizing understanding. Pt had no further questions & when asked whether there were any concerns pt reported none.     Patient left HOB elevated with all lines intact, call button in reach, bed alarm on, and nurse notified    GOALS:   Multidisciplinary Problems       Occupational Therapy Goals          Problem: Occupational Therapy    Goal Priority Disciplines Outcome Interventions   Occupational Therapy Goal     OT, PT/OT Progressing    Description: Goals to be met by: 04/28/2025     Patient will increase functional independence with ADLs by performing:    UE Dressing with Supervision.  LE Dressing with Stand-by Assistance.  Grooming while seated with Set-up Assistance.  Toileting from bedside  commode with Contact Guard Assistance for hygiene and clothing management.   Supine to sit with [place].  Stand pivot transfer with Contact Guard Assistance  Toilet transfer to bedside commode with Contact Guard Assistance.                         History:     Past Medical History:   Diagnosis Date    Arthritis     Epilepsy     Hyperglycemia     Stroke          Past Surgical History:   Procedure Laterality Date    CHOLECYSTECTOMY      JOINT REPLACEMENT      SKIN CANCER EXCISION         Time Tracking:     OT Date of Treatment: 03/28/25  OT Start Time: 1032  OT Stop Time: 1059  OT Total Time (min): 27 min    Billable Minutes:Evaluation 10  Therapeutic Activity 17    3/28/2025

## 2025-03-28 NOTE — PLAN OF CARE
Problem: Physical Therapy  Goal: Physical Therapy Goal  Description: Goals to be met by:      Patient will increase functional independence with mobility by performin. Supine to sit with MInimal Assistance  2. Sit to supine with MInimal Assistance  3. Rolling to Left and Right with Stand-by Assistance.  4. Sit to stand transfer with Moderate Assistance  5. Bed to chair transfer with Moderate Assistance using slideboard vs squat pivot technique.   6. Wheelchair propulsion x100 feet with Stand-by Assistance using UE/LE  7. Sitting at edge of bed x10 minutes with Stand-by Assistance    Outcome: Progressing   Evaluation completed, initiated plan of care.   Fernanda Fam, PT  3/28/2025

## 2025-03-28 NOTE — HPI
Zelalem Gamez is a 74 year old male with a PMHx of HFrEF (EF 35%), hypertension, peripheral arterial disease, chronic kidney disease on hemodialysis (MWF), seizure disorder presents to [facility] [place] after routine labs revealed Hgb 6.6. He does report constipation, abdominal pain and unintended weight loss (approx. 10-15 lbs) as of late but does report good appetite.  During this admission, he has received 1U pRBc with repeat Hgb 9.3. He denies fever, chills, weakness, fatigue, diarrhea, any bleeding - hematemesis, hemoptysis, hematochezia, and melena. No prior history of EGD past, colonoscopy (?1998), he is unsure of the results of the colonoscopy. CT abd pelvis with findings concerning for large amount of stool within the rectum noting rectal wall thickening with possible consideration of infectious/inflammatory processes. Colonic diverticulosis without convincing findings to suggest diverticulitis.Moderate bilateral pleural effusions with associated pericardial effusion suggest volume overload    He was recently admitted to [facility] [place]/21-3/7 for syncopal event and admitted to ICU for management of UTI, acute kidney injury, rhabdomyolysis, acute hypoxic respiratory failure 2/2 to volume overload, atrial fibrillation with RVR.  During the admission, he was notable for anemia requiring 2U pRBC and was evaluated by GI but no intervention was needed at the time.     GI consulted for GI bleed.

## 2025-03-28 NOTE — ASSESSMENT & PLAN NOTE
Patient has paroxysmal (<7 days) atrial fibrillation. Patient is currently in sinus rhythm. [id number] Score: 2. The patients heart rate in the last 24 hours is as follows:  Pulse  Min: 60  Max: 84     Antiarrhythmics  , Daily, Oral  , Daily, Oral  amiodarone tablet 200 mg, Daily, Oral  metoprolol succinate (TOPROL-XL) 24 hr tablet 25 mg, Daily, Oral    Anticoagulants       Plan  - Replete lytes with a goal of K>4, Mg >2  - Patient is not anticoagulated due to risk of bleeding  - Patient's afib is currently controlled

## 2025-03-28 NOTE — SUBJECTIVE & OBJECTIVE
Interval History: Pt transfused 1 unit prbc overnight w/ improvement of hgb 9.3. GI tentatively planning for scope 3/31. Discussed diet w/ them, they are agreeable to regular diet until closer to bowel prep and scope. Discussed concerns of stercoral proctitis w/ CRS, will continue to monitor following stool evacuation. ANGEL today w/ Cr 3.2, will give small IVF bolus and continue to monitor.     Review of Systems   Constitutional:  Negative for activity change, chills, diaphoresis, fatigue and fever.   HENT:  Negative for congestion, rhinorrhea and sore throat.    Respiratory:  Positive for cough (chronic, productive). Negative for chest tightness, shortness of breath and wheezing.    Cardiovascular:  Negative for chest pain, palpitations and leg swelling.   Gastrointestinal:  Positive for abdominal pain and constipation. Negative for abdominal distention, blood in stool, diarrhea, nausea and vomiting.   Genitourinary:  Negative for difficulty urinating, dysuria, frequency, hematuria and urgency.   Musculoskeletal:  Negative for arthralgias, back pain, gait problem and neck stiffness.   Neurological:  Negative for dizziness, tremors, seizures, syncope, weakness, light-headedness, numbness and headaches.   Psychiatric/Behavioral:  Negative for agitation, confusion and hallucinations.      Objective:     Vital Signs (Most Recent):  Temp: 97.9 °F (36.6 °C) (03/28/25 0739)  Pulse: 66 (03/28/25 0739)  Resp: 18 (03/28/25 0739)  BP: (!) 191/62 (03/28/25 0739)  SpO2: 96 % (03/28/25 0739) Vital Signs (24h Range):  Temp:  [97.4 °F (36.3 °C)-98.1 °F (36.7 °C)] 97.9 °F (36.6 °C)  Pulse:  [60-84] 66  Resp:  [15-18] 18  SpO2:  [72 %-98 %] 96 %  BP: (150-201)/() 191/62     Weight: 45 kg (99 lb 3.3 oz)  Body mass index is 16.01 kg/m².    Intake/Output Summary (Last 24 hours) at 3/28/2025 1054  Last data filed at 3/27/2025 2236  Gross per 24 hour   Intake 432.92 ml   Output --   Net 432.92 ml         Physical Exam  Vitals and  nursing note reviewed.   Constitutional:       General: He is not in acute distress.     Appearance: He is cachectic. He is not diaphoretic.   HENT:      Head: Normocephalic and atraumatic.      Right Ear: External ear normal.      Left Ear: External ear normal.      Nose: Nose normal. No congestion.      Mouth/Throat:      Pharynx: Oropharynx is clear.   Eyes:      General: No scleral icterus.     Extraocular Movements: Extraocular movements intact.   Cardiovascular:      Rate and Rhythm: Normal rate and regular rhythm.      Pulses: Normal pulses.      Heart sounds: Normal heart sounds. No murmur heard.  Pulmonary:      Effort: Pulmonary effort is normal. No respiratory distress.      Breath sounds: Normal breath sounds. No wheezing or rales.   Abdominal:      General: Bowel sounds are normal. There is no distension.      Palpations: Abdomen is soft.      Tenderness: There is abdominal tenderness. There is no guarding or rebound.   Musculoskeletal:      Cervical back: Normal range of motion.      Right lower leg: No edema.      Left lower leg: No edema.   Skin:     General: Skin is warm and dry.      Capillary Refill: Capillary refill takes less than 2 seconds.   Neurological:      General: No focal deficit present.      Mental Status: He is alert and oriented to person, place, and time. Mental status is at baseline.   Psychiatric:         Mood and Affect: Mood normal.         Behavior: Behavior normal.         Thought Content: Thought content normal.               Significant Labs: All pertinent labs within the past 24 hours have been reviewed.    Significant Imaging: I have reviewed all pertinent imaging results/findings within the past 24 hours.

## 2025-03-28 NOTE — NURSING
Patient arrived to unit via stretcher with Oxygen, Telemetry, patient belonging, no acute distress. Patient AAOx4, BP high MD notified. Patient with no complains at this time. Patient oriented to room and call light use, instructed to call staff for assistance.Side rails up x2, bed locked in lowest position, bed alarm set, call light within reach. Patient informed of POC, verbalized understanding. POC on going.

## 2025-03-29 PROBLEM — F17.200 TOBACCO DEPENDENCY: Status: ACTIVE | Noted: 2025-03-29

## 2025-03-29 LAB
ABSOLUTE EOSINOPHIL (OHS): 0.03 K/UL
ABSOLUTE MONOCYTE (OHS): 0.74 K/UL (ref 0.3–1)
ABSOLUTE NEUTROPHIL COUNT (OHS): 9.35 K/UL (ref 1.8–7.7)
ANION GAP (OHS): 12 MMOL/L (ref 8–16)
BASOPHILS # BLD AUTO: 0.06 K/UL
BASOPHILS NFR BLD AUTO: 0.5 %
BUN SERPL-MCNC: 28 MG/DL (ref 8–23)
CALCIUM SERPL-MCNC: 7.8 MG/DL (ref 8.7–10.5)
CHLORIDE SERPL-SCNC: 103 MMOL/L (ref 95–110)
CO2 SERPL-SCNC: 22 MMOL/L (ref 23–29)
CREAT SERPL-MCNC: 3.4 MG/DL (ref 0.5–1.4)
ERYTHROCYTE [DISTWIDTH] IN BLOOD BY AUTOMATED COUNT: 21.7 % (ref 11.5–14.5)
GFR SERPLBLD CREATININE-BSD FMLA CKD-EPI: 18 ML/MIN/1.73/M2
GLUCOSE SERPL-MCNC: 78 MG/DL (ref 70–110)
HCT VFR BLD AUTO: 29.5 % (ref 40–54)
HGB BLD-MCNC: 9.5 GM/DL (ref 14–18)
IMM GRANULOCYTES # BLD AUTO: 0.12 K/UL (ref 0–0.04)
IMM GRANULOCYTES NFR BLD AUTO: 1.1 % (ref 0–0.5)
LYMPHOCYTES # BLD AUTO: 0.78 K/UL (ref 1–4.8)
MAGNESIUM SERPL-MCNC: 2.5 MG/DL (ref 1.6–2.6)
MCH RBC QN AUTO: 31.3 PG (ref 27–50)
MCHC RBC AUTO-ENTMCNC: 32.2 G/DL (ref 32–36)
MCV RBC AUTO: 97 FL (ref 82–98)
NUCLEATED RBC (/100WBC) (OHS): 0 /100 WBC
PLATELET # BLD AUTO: 288 K/UL (ref 150–450)
PMV BLD AUTO: 10.8 FL (ref 9.2–12.9)
POTASSIUM SERPL-SCNC: 3.5 MMOL/L (ref 3.5–5.1)
RBC # BLD AUTO: 3.04 M/UL (ref 4.6–6.2)
RELATIVE EOSINOPHIL (OHS): 0.3 %
RELATIVE LYMPHOCYTE (OHS): 7 % (ref 18–48)
RELATIVE MONOCYTE (OHS): 6.7 % (ref 4–15)
RELATIVE NEUTROPHIL (OHS): 84.4 % (ref 38–73)
SODIUM SERPL-SCNC: 137 MMOL/L (ref 136–145)
WBC # BLD AUTO: 11.08 K/UL (ref 3.9–12.7)

## 2025-03-29 PROCEDURE — 85025 COMPLETE CBC W/AUTO DIFF WBC: CPT | Performed by: PHYSICIAN ASSISTANT

## 2025-03-29 PROCEDURE — 83735 ASSAY OF MAGNESIUM: CPT | Performed by: PHYSICIAN ASSISTANT

## 2025-03-29 PROCEDURE — 36415 COLL VENOUS BLD VENIPUNCTURE: CPT | Performed by: PHYSICIAN ASSISTANT

## 2025-03-29 PROCEDURE — 63600175 PHARM REV CODE 636 W HCPCS: Performed by: STUDENT IN AN ORGANIZED HEALTH CARE EDUCATION/TRAINING PROGRAM

## 2025-03-29 PROCEDURE — 25000003 PHARM REV CODE 250: Performed by: PHYSICIAN ASSISTANT

## 2025-03-29 PROCEDURE — 63600175 PHARM REV CODE 636 W HCPCS: Performed by: PHYSICIAN ASSISTANT

## 2025-03-29 PROCEDURE — 25000003 PHARM REV CODE 250: Performed by: STUDENT IN AN ORGANIZED HEALTH CARE EDUCATION/TRAINING PROGRAM

## 2025-03-29 PROCEDURE — 21400001 HC TELEMETRY ROOM

## 2025-03-29 PROCEDURE — 80048 BASIC METABOLIC PNL TOTAL CA: CPT | Performed by: PHYSICIAN ASSISTANT

## 2025-03-29 RX ORDER — HYDRALAZINE HYDROCHLORIDE 50 MG/1
50 TABLET, FILM COATED ORAL EVERY 8 HOURS
Status: DISCONTINUED | OUTPATIENT
Start: 2025-03-29 | End: 2025-04-04 | Stop reason: HOSPADM

## 2025-03-29 RX ADMIN — METOPROLOL SUCCINATE 25 MG: 25 TABLET, EXTENDED RELEASE ORAL at 09:03

## 2025-03-29 RX ADMIN — Medication 250 MG: at 09:03

## 2025-03-29 RX ADMIN — CEFTRIAXONE 2 G: 2 INJECTION, POWDER, FOR SOLUTION INTRAMUSCULAR; INTRAVENOUS at 03:03

## 2025-03-29 RX ADMIN — PANTOPRAZOLE SODIUM 40 MG: 40 INJECTION, POWDER, FOR SOLUTION INTRAVENOUS at 10:03

## 2025-03-29 RX ADMIN — AMIODARONE HYDROCHLORIDE 200 MG: 200 TABLET ORAL at 09:03

## 2025-03-29 RX ADMIN — SENNOSIDES 8.6 MG: 8.6 TABLET, FILM COATED ORAL at 09:03

## 2025-03-29 RX ADMIN — PANTOPRAZOLE SODIUM 40 MG: 40 INJECTION, POWDER, FOR SOLUTION INTRAVENOUS at 09:03

## 2025-03-29 RX ADMIN — AMLODIPINE BESYLATE 10 MG: 10 TABLET ORAL at 09:03

## 2025-03-29 RX ADMIN — POLYETHYLENE GLYCOL 3350 17 G: 17 POWDER, FOR SOLUTION ORAL at 09:03

## 2025-03-29 RX ADMIN — HYDRALAZINE HYDROCHLORIDE 50 MG: 50 TABLET ORAL at 03:03

## 2025-03-29 RX ADMIN — PHENOBARBITAL 32.4 MG: 32.4 TABLET ORAL at 09:03

## 2025-03-29 RX ADMIN — SEVELAMER CARBONATE 800 MG: 800 TABLET, FILM COATED ORAL at 06:03

## 2025-03-29 RX ADMIN — HYDRALAZINE HYDROCHLORIDE 25 MG: 25 TABLET ORAL at 06:03

## 2025-03-29 RX ADMIN — ZONISAMIDE 100 MG: 100 CAPSULE ORAL at 09:03

## 2025-03-29 RX ADMIN — Medication 2000 UNITS: at 09:03

## 2025-03-29 RX ADMIN — ARIPIPRAZOLE 2 MG: 2 TABLET ORAL at 09:03

## 2025-03-29 RX ADMIN — FERROUS SULFATE TAB 325 MG (65 MG ELEMENTAL FE) 1 EACH: 325 (65 FE) TAB at 09:03

## 2025-03-29 RX ADMIN — ACETAMINOPHEN 650 MG: 325 TABLET ORAL at 10:03

## 2025-03-29 RX ADMIN — SEVELAMER CARBONATE 800 MG: 800 TABLET, FILM COATED ORAL at 09:03

## 2025-03-29 NOTE — PROGRESS NOTES
[facility] - Internal Medicine [facility]  Progress Note    Patient Name: Zelalem Gamez  MRN: [id number]  Patient Class: IP- Inpatient   Admission Date: 3/27/2025  Length of Stay: 1 days  Attending Physician: Daisy Sandoval MD  Primary Care Provider: Deysi Pink MD        Subjective     Principal Problem:Symptomatic anemia        HPI:  Mr. Zelalem Gamez arpit 74 year old male with a pMHx of FrEF (EF 35%), hypertension, peripheral arterial disease, chronic kidney disease on hemodialysis (MWF), seizure disorder who presentsafter routine labs revealed hemoglobin of 6.6. He is currently residing at a SNF after being discharged from the hospital on 03/07. Patient reports he has been having persistent constipation for several weeks. He has not noticed any blood in his stools, however, he reports that a nurse changes his briefs for him. He has been able to get out of bed with the help of PT but is mostly confined to his bed due to generalized weakness that has been ongoing since he was discharged from the hospital. He also endorses nasal congestion and cough with sputum production for several weeks. He is on 1-2L of O2 at baseline. Patient was admitted from 2/21-03/07 after a syncopal event. He was found to have several issues going on, including acute blood loss anemia requiring 2 units PRBCs. At this time, he also had an ANGEL on CKD along with rhabdomyolysis, so a tunneled catheter was placed and he was started on dialysis. He has been going to dialysis regularly. He denies headache, fever, chills, hemoptysis, decreased appetite, chest pain, abdominal pain, nausea, vomiting, dysuria, hematuria, or lower extremity pain/swelling.      In ED, Pt AFVSS on 1L NC. Hgb 6.6>6.8. No leukocytosis. K 3.4. Cr 3, previously 2.8. Trop 19. CXR: Findings suggestive of pulmonary edema with bilateral pleural effusions, left greater than right. Admitted to  for c/f GIB.     Overview/Hospital Course:  Pt  admitted for evaluation of symptomatic anemia from clinic w/ Hgb 6.6. Pt afebrile and HDS. Started on GIB pathway and transfused 1 unit PRBC. IV protonix. GI consulted and tentatively plan for scope 3/31. CTA c/f stercoral proctitis w/ large stool burden. Discussed w/ CRS, patient stable, will evacuate stool and continue to monitor. ANGEL likely d/t decreased PO intake, s/p IVF. Also w/ b/l pleural effusions was diuresed following transfusion.    Interval History: NAEON. Pt seen and evaluated at bedside this am.  Pt is doing well this am. Hgb stable. Cr 3.4 today, worsened from yesterday. Cr is in range of the average seen over the year, however lowest recent Cr 2. Pt may be at baseline kidney function. Will discuss w/ nephrology.     Review of Systems   Constitutional:  Negative for activity change, chills, diaphoresis, fatigue and fever.   HENT:  Negative for congestion, rhinorrhea and sore throat.    Respiratory:  Positive for cough (chronic, productive). Negative for chest tightness, shortness of breath and wheezing.    Cardiovascular:  Negative for chest pain, palpitations and leg swelling.   Gastrointestinal:  Negative for abdominal distention, abdominal pain, blood in stool, constipation, diarrhea, nausea and vomiting.   Genitourinary:  Negative for difficulty urinating, dysuria, frequency, hematuria and urgency.   Musculoskeletal:  Positive for arthralgias. Negative for back pain, gait problem and neck stiffness.   Neurological:  Negative for dizziness, tremors, seizures, syncope, weakness, light-headedness, numbness and headaches.   Psychiatric/Behavioral:  Negative for agitation, confusion and hallucinations.      Objective:     Vital Signs (Most Recent):  Temp: 97.6 °F (36.4 °C) (03/29/25 0748)  Pulse: 72 (03/29/25 0902)  Resp: 18 (03/29/25 0748)  BP: (!) 194/78 (03/29/25 0902)  SpO2: (!) 92 % (03/29/25 0748) Vital Signs (24h Range):  Temp:  [97.5 °F (36.4 °C)-98.3 °F (36.8 °C)] 97.6 °F (36.4 °C)  Pulse:   [58-75] 72  Resp:  [18] 18  SpO2:  [91 %-95 %] 92 %  BP: (148-197)/(53-78) 194/78     Weight: 45 kg (99 lb 3.3 oz)  Body mass index is 16.01 kg/m².    Intake/Output Summary (Last 24 hours) at 3/29/2025 1020  Last data filed at 3/29/2025 0518  Gross per 24 hour   Intake 1000 ml   Output 500 ml   Net 500 ml         Physical Exam  Vitals and nursing note reviewed.   Constitutional:       General: He is not in acute distress.     Appearance: He is cachectic. He is not diaphoretic.   HENT:      Head: Normocephalic and atraumatic.      Right Ear: External ear normal.      Left Ear: External ear normal.      Nose: Nose normal. No congestion.      Mouth/Throat:      Pharynx: Oropharynx is clear.   Eyes:      General: No scleral icterus.     Extraocular Movements: Extraocular movements intact.   Cardiovascular:      Rate and Rhythm: Normal rate and regular rhythm.      Pulses: Normal pulses.      Heart sounds: Normal heart sounds. No murmur heard.  Pulmonary:      Effort: Pulmonary effort is normal. No respiratory distress.      Breath sounds: Normal breath sounds. No wheezing or rales.   Abdominal:      General: Bowel sounds are normal. There is no distension.      Palpations: Abdomen is soft.      Tenderness: There is no abdominal tenderness. There is no guarding or rebound.   Musculoskeletal:      Cervical back: Normal range of motion.      Right lower leg: No edema.      Left lower leg: No edema.   Skin:     General: Skin is warm and dry.      Capillary Refill: Capillary refill takes less than 2 seconds.   Neurological:      General: No focal deficit present.      Mental Status: He is alert and oriented to person, place, and time. Mental status is at baseline.   Psychiatric:         Mood and Affect: Mood normal.         Behavior: Behavior normal.         Thought Content: Thought content normal.               Significant Labs: All pertinent labs within the past 24 hours have been reviewed.    Significant Imaging: I have  reviewed all pertinent imaging results/findings within the past 24 hours.      Assessment & Plan  Symptomatic anemia  Anemia is likely due to  unknown suspect GI . Most recent hemoglobin and hematocrit are listed below.  Recent Labs     03/28/25  0048 03/28/25  0610 03/29/25  0855   HGB 9.7* 9.3* 9.5*   HCT 29.7* 28.6* 29.5*     Plan  - Monitor serial CBC: Every 8 hours  - Transfuse PRBC if patient becomes hemodynamically unstable, symptomatic or H/H drops below 7/21.  - Patient has received 1 units of PRBCs on 3/27  - Patient's anemia is currently stable  - CLD, NPO at mn  - IV Protonix 40mg BID  - IVF hydration   - 2 large bore IV access  - anti-emetics as needed  - GI consulted, appreciate recs   - Plan for EGD and colonoscopy Monday                                                                        - NPO midnight before  - Golytely prep to start at 6pm, to be completed within 4 hours if possible  Seizure disorder  - continue phenobarbital and zonisamide  Stage 3b chronic kidney disease  ANGEL (acute kidney injury)  Creatine stable for now. BMP reviewed- noted Estimated Creatinine Clearance: 11.9 mL/min (A) (based on SCr of 3.4 mg/dL (H)). according to latest data. Based on current GFR, CKD stage is stage 3 - GFR 30-59.  Monitor UOP and serial BMP and adjust therapy as needed. Renally dose meds. Avoid nephrotoxic medications and procedures.    ANGEL is likely due to pre-renal azotemia due to dehydration. Baseline creatinine is  2.8 . Most recent creatinine and eGFR are listed below.  Recent Labs     03/27/25  1456 03/28/25  0610 03/29/25  0519   CREATININE 3.0* 3.2* 3.4*   EGFRNORACEVR 21* 19* 18*      Plan  - ANGEL is worsening. Will adjust treatment as follows: 500 ml NS bolus  - Avoid nephrotoxins and renally dose meds for GFR listed above  - Monitor urine output, serial BMP, and adjust therapy as needed  Generalized weakness  - PTOT    Atrial fibrillation  Patient has paroxysmal (<7 days) atrial fibrillation.  Patient is currently in sinus rhythm. [id number] Score: 2. The patients heart rate in the last 24 hours is as follows:  Pulse  Min: 58  Max: 75     Antiarrhythmics  , Daily, Oral  , Daily, Oral  amiodarone tablet 200 mg, Daily, Oral  metoprolol succinate (TOPROL-XL) 24 hr tablet 25 mg, Daily, Oral    Anticoagulants       Plan  - Replete lytes with a goal of K>4, Mg >2  - Patient is not anticoagulated due to risk of bleeding  - Patient's afib is currently controlled    Pleural effusion  Patient found to have small pleural effusion on imaging. I have personally reviewed and interpreted the following imaging: Xray. A thoracentesis was deferred. Most likely etiology includes Congestive Heart Failure. Management to include Diuresis    - lasix 40 mg x1  - echo pending  Stercoral colitis  Constipation  - bowel regimen for stool evacuation and continue monitoring    Tobacco dependency  Dangers of cigarette smoking were reviewed with patient in detail. Patient was Counseled for 3-10 minutes. Nicotine replacement options were discussed. Nicotine replacement was discussed- not prescribed per patient's request  VTE Risk Mitigation (From admission, onward)           Ordered     Place sequential compression device  Until discontinued         03/27/25 1704     IP VTE HIGH RISK PATIENT  Once         03/27/25 1704     Place sequential compression device  Until discontinued         03/27/25 1704     Reason for no Mechanical VTE Prophylaxis  Once        Question:  Reasons:  Answer:  Risk of Bleeding    03/27/25 1704                    Discharge Planning   МАРИНА: 4/1/2025     Code Status: Full Code   Medical Readiness for Discharge Date:   Discharge Plan A: Skilled Nursing Facility                Please place Justification for DME        Nestro Snow PA-C  Department of Hospital Medicine   [facility] - Internal Medicine Telemetry

## 2025-03-29 NOTE — SUBJECTIVE & OBJECTIVE
Interval History: NAEON. Pt seen and evaluated at bedside this am.  Pt is doing well this am. Hgb stable. Cr 3.4 today, worsened from yesterday. Cr is in range of the average seen over the year, however lowest recent Cr 2. Pt may be at baseline kidney function. Will discuss w/ nephrology.     Review of Systems   Constitutional:  Negative for activity change, chills, diaphoresis, fatigue and fever.   HENT:  Negative for congestion, rhinorrhea and sore throat.    Respiratory:  Positive for cough (chronic, productive). Negative for chest tightness, shortness of breath and wheezing.    Cardiovascular:  Negative for chest pain, palpitations and leg swelling.   Gastrointestinal:  Negative for abdominal distention, abdominal pain, blood in stool, constipation, diarrhea, nausea and vomiting.   Genitourinary:  Negative for difficulty urinating, dysuria, frequency, hematuria and urgency.   Musculoskeletal:  Positive for arthralgias. Negative for back pain, gait problem and neck stiffness.   Neurological:  Negative for dizziness, tremors, seizures, syncope, weakness, light-headedness, numbness and headaches.   Psychiatric/Behavioral:  Negative for agitation, confusion and hallucinations.      Objective:     Vital Signs (Most Recent):  Temp: 97.6 °F (36.4 °C) (03/29/25 0748)  Pulse: 72 (03/29/25 0902)  Resp: 18 (03/29/25 0748)  BP: (!) 194/78 (03/29/25 0902)  SpO2: (!) 92 % (03/29/25 0748) Vital Signs (24h Range):  Temp:  [97.5 °F (36.4 °C)-98.3 °F (36.8 °C)] 97.6 °F (36.4 °C)  Pulse:  [58-75] 72  Resp:  [18] 18  SpO2:  [91 %-95 %] 92 %  BP: (148-197)/(53-78) 194/78     Weight: 45 kg (99 lb 3.3 oz)  Body mass index is 16.01 kg/m².    Intake/Output Summary (Last 24 hours) at 3/29/2025 1020  Last data filed at 3/29/2025 0518  Gross per 24 hour   Intake 1000 ml   Output 500 ml   Net 500 ml         Physical Exam  Vitals and nursing note reviewed.   Constitutional:       General: He is not in acute distress.     Appearance: He is  cachectic. He is not diaphoretic.   HENT:      Head: Normocephalic and atraumatic.      Right Ear: External ear normal.      Left Ear: External ear normal.      Nose: Nose normal. No congestion.      Mouth/Throat:      Pharynx: Oropharynx is clear.   Eyes:      General: No scleral icterus.     Extraocular Movements: Extraocular movements intact.   Cardiovascular:      Rate and Rhythm: Normal rate and regular rhythm.      Pulses: Normal pulses.      Heart sounds: Normal heart sounds. No murmur heard.  Pulmonary:      Effort: Pulmonary effort is normal. No respiratory distress.      Breath sounds: Normal breath sounds. No wheezing or rales.   Abdominal:      General: Bowel sounds are normal. There is no distension.      Palpations: Abdomen is soft.      Tenderness: There is no abdominal tenderness. There is no guarding or rebound.   Musculoskeletal:      Cervical back: Normal range of motion.      Right lower leg: No edema.      Left lower leg: No edema.   Skin:     General: Skin is warm and dry.      Capillary Refill: Capillary refill takes less than 2 seconds.   Neurological:      General: No focal deficit present.      Mental Status: He is alert and oriented to person, place, and time. Mental status is at baseline.   Psychiatric:         Mood and Affect: Mood normal.         Behavior: Behavior normal.         Thought Content: Thought content normal.               Significant Labs: All pertinent labs within the past 24 hours have been reviewed.    Significant Imaging: I have reviewed all pertinent imaging results/findings within the past 24 hours.

## 2025-03-29 NOTE — ASSESSMENT & PLAN NOTE
Anemia is likely due to unknown suspect GI. Most recent hemoglobin and hematocrit are listed below.  Recent Labs     03/28/25  0048 03/28/25  0610 03/29/25  0855   HGB 9.7* 9.3* 9.5*   HCT 29.7* 28.6* 29.5*     Plan  - Monitor serial CBC: Every 8 hours  - Transfuse PRBC if patient becomes hemodynamically unstable, symptomatic or H/H drops below 7/21.  - Patient has received 1 units of PRBCs on 3/27  - Patient's anemia is currently stable  - CLD, NPO at mn  - IV Protonix 40mg BID  - IVF hydration   - 2 large bore IV access  - anti-emetics as needed  - GI consulted, appreciate recs   - Plan for EGD and colonoscopy Monday                                                                        - NPO midnight before  - Golytely prep to start at 6pm, to be completed within 4 hours if possible

## 2025-03-29 NOTE — ASSESSMENT & PLAN NOTE
Patient has paroxysmal (<7 days) atrial fibrillation. Patient is currently in sinus rhythm. [id number] Score: 2. The patients heart rate in the last 24 hours is as follows:  Pulse  Min: 58  Max: 75     Antiarrhythmics  , Daily, Oral  , Daily, Oral  amiodarone tablet 200 mg, Daily, Oral  metoprolol succinate (TOPROL-XL) 24 hr tablet 25 mg, Daily, Oral    Anticoagulants       Plan  - Replete lytes with a goal of K>4, Mg >2  - Patient is not anticoagulated due to risk of bleeding  - Patient's afib is currently controlled

## 2025-03-29 NOTE — ASSESSMENT & PLAN NOTE
Creatine stable for now. BMP reviewed- noted Estimated Creatinine Clearance: 11.9 mL/min (A) (based on SCr of 3.4 mg/dL (H)). according to latest data. Based on current GFR, CKD stage is stage 3 - GFR 30-59.  Monitor UOP and serial BMP and adjust therapy as needed. Renally dose meds. Avoid nephrotoxic medications and procedures.    ANGEL is likely due to pre-renal azotemia due to dehydration. Baseline creatinine is 2.8. Most recent creatinine and eGFR are listed below.  Recent Labs     03/27/25  1456 03/28/25  0610 03/29/25  0519   CREATININE 3.0* 3.2* 3.4*   EGFRNORACEVR 21* 19* 18*      Plan  - ANGEL is worsening. Will adjust treatment as follows: 500 ml NS bolus  - Avoid nephrotoxins and renally dose meds for GFR listed above  - Monitor urine output, serial BMP, and adjust therapy as needed

## 2025-03-30 LAB
ABSOLUTE EOSINOPHIL (OHS): 0.07 K/UL
ABSOLUTE MONOCYTE (OHS): 0.82 K/UL (ref 0.3–1)
ABSOLUTE NEUTROPHIL COUNT (OHS): 9.06 K/UL (ref 1.8–7.7)
ANION GAP (OHS): 10 MMOL/L (ref 8–16)
BASOPHILS # BLD AUTO: 0.04 K/UL
BASOPHILS NFR BLD AUTO: 0.4 %
BUN SERPL-MCNC: 31 MG/DL (ref 8–23)
CALCIUM SERPL-MCNC: 7.8 MG/DL (ref 8.7–10.5)
CHLORIDE SERPL-SCNC: 102 MMOL/L (ref 95–110)
CO2 SERPL-SCNC: 25 MMOL/L (ref 23–29)
CREAT SERPL-MCNC: 3.5 MG/DL (ref 0.5–1.4)
ERYTHROCYTE [DISTWIDTH] IN BLOOD BY AUTOMATED COUNT: 21.8 % (ref 11.5–14.5)
GFR SERPLBLD CREATININE-BSD FMLA CKD-EPI: 17 ML/MIN/1.73/M2
GLUCOSE SERPL-MCNC: 77 MG/DL (ref 70–110)
HCT VFR BLD AUTO: 29.8 % (ref 40–54)
HGB BLD-MCNC: 9.6 GM/DL (ref 14–18)
IMM GRANULOCYTES # BLD AUTO: 0.09 K/UL (ref 0–0.04)
IMM GRANULOCYTES NFR BLD AUTO: 0.8 % (ref 0–0.5)
LYMPHOCYTES # BLD AUTO: 0.79 K/UL (ref 1–4.8)
MAGNESIUM SERPL-MCNC: 2.5 MG/DL (ref 1.6–2.6)
MCH RBC QN AUTO: 31 PG (ref 27–50)
MCHC RBC AUTO-ENTMCNC: 32.2 G/DL (ref 32–36)
MCV RBC AUTO: 96 FL (ref 82–98)
NUCLEATED RBC (/100WBC) (OHS): 0 /100 WBC
PLATELET # BLD AUTO: 299 K/UL (ref 150–450)
PMV BLD AUTO: 10.5 FL (ref 9.2–12.9)
POTASSIUM SERPL-SCNC: 3.2 MMOL/L (ref 3.5–5.1)
RBC # BLD AUTO: 3.1 M/UL (ref 4.6–6.2)
RELATIVE EOSINOPHIL (OHS): 0.6 %
RELATIVE LYMPHOCYTE (OHS): 7.3 % (ref 18–48)
RELATIVE MONOCYTE (OHS): 7.5 % (ref 4–15)
RELATIVE NEUTROPHIL (OHS): 83.4 % (ref 38–73)
SODIUM SERPL-SCNC: 137 MMOL/L (ref 136–145)
WBC # BLD AUTO: 10.87 K/UL (ref 3.9–12.7)

## 2025-03-30 PROCEDURE — 25000003 PHARM REV CODE 250: Performed by: STUDENT IN AN ORGANIZED HEALTH CARE EDUCATION/TRAINING PROGRAM

## 2025-03-30 PROCEDURE — 82310 ASSAY OF CALCIUM: CPT | Performed by: PHYSICIAN ASSISTANT

## 2025-03-30 PROCEDURE — 63600175 PHARM REV CODE 636 W HCPCS: Performed by: STUDENT IN AN ORGANIZED HEALTH CARE EDUCATION/TRAINING PROGRAM

## 2025-03-30 PROCEDURE — 25000003 PHARM REV CODE 250: Performed by: PHYSICIAN ASSISTANT

## 2025-03-30 PROCEDURE — 63600175 PHARM REV CODE 636 W HCPCS: Performed by: PHYSICIAN ASSISTANT

## 2025-03-30 PROCEDURE — 85025 COMPLETE CBC W/AUTO DIFF WBC: CPT | Performed by: PHYSICIAN ASSISTANT

## 2025-03-30 PROCEDURE — 83735 ASSAY OF MAGNESIUM: CPT | Performed by: PHYSICIAN ASSISTANT

## 2025-03-30 PROCEDURE — 36415 COLL VENOUS BLD VENIPUNCTURE: CPT | Performed by: PHYSICIAN ASSISTANT

## 2025-03-30 PROCEDURE — 21400001 HC TELEMETRY ROOM

## 2025-03-30 RX ADMIN — SENNOSIDES 8.6 MG: 8.6 TABLET, FILM COATED ORAL at 09:03

## 2025-03-30 RX ADMIN — FERROUS SULFATE TAB 325 MG (65 MG ELEMENTAL FE) 1 EACH: 325 (65 FE) TAB at 08:03

## 2025-03-30 RX ADMIN — CEFTRIAXONE 2 G: 2 INJECTION, POWDER, FOR SOLUTION INTRAMUSCULAR; INTRAVENOUS at 02:03

## 2025-03-30 RX ADMIN — HYDRALAZINE HYDROCHLORIDE 50 MG: 50 TABLET ORAL at 06:03

## 2025-03-30 RX ADMIN — Medication 2000 UNITS: at 08:03

## 2025-03-30 RX ADMIN — PHENOBARBITAL 32.4 MG: 32.4 TABLET ORAL at 09:03

## 2025-03-30 RX ADMIN — POLYETHYLENE GLYCOL 3350, SODIUM SULFATE ANHYDROUS, SODIUM BICARBONATE, SODIUM CHLORIDE, POTASSIUM CHLORIDE 4000 ML: 236; 22.74; 6.74; 5.86; 2.97 POWDER, FOR SOLUTION ORAL at 05:03

## 2025-03-30 RX ADMIN — AMLODIPINE BESYLATE 10 MG: 10 TABLET ORAL at 08:03

## 2025-03-30 RX ADMIN — POLYETHYLENE GLYCOL 3350 17 G: 17 POWDER, FOR SOLUTION ORAL at 08:03

## 2025-03-30 RX ADMIN — AMIODARONE HYDROCHLORIDE 200 MG: 200 TABLET ORAL at 08:03

## 2025-03-30 RX ADMIN — SEVELAMER CARBONATE 800 MG: 800 TABLET, FILM COATED ORAL at 05:03

## 2025-03-30 RX ADMIN — ARIPIPRAZOLE 2 MG: 2 TABLET ORAL at 08:03

## 2025-03-30 RX ADMIN — SEVELAMER CARBONATE 800 MG: 800 TABLET, FILM COATED ORAL at 11:03

## 2025-03-30 RX ADMIN — HYDRALAZINE HYDROCHLORIDE 50 MG: 50 TABLET ORAL at 01:03

## 2025-03-30 RX ADMIN — SEVELAMER CARBONATE 800 MG: 800 TABLET, FILM COATED ORAL at 08:03

## 2025-03-30 RX ADMIN — PANTOPRAZOLE SODIUM 40 MG: 40 INJECTION, POWDER, FOR SOLUTION INTRAVENOUS at 08:03

## 2025-03-30 RX ADMIN — METOPROLOL SUCCINATE 25 MG: 25 TABLET, EXTENDED RELEASE ORAL at 08:03

## 2025-03-30 RX ADMIN — PANTOPRAZOLE SODIUM 40 MG: 40 INJECTION, POWDER, FOR SOLUTION INTRAVENOUS at 09:03

## 2025-03-30 RX ADMIN — ZONISAMIDE 100 MG: 100 CAPSULE ORAL at 08:03

## 2025-03-30 RX ADMIN — Medication 250 MG: at 08:03

## 2025-03-30 NOTE — ASSESSMENT & PLAN NOTE
Patient has paroxysmal (<7 days) atrial fibrillation. Patient is currently in sinus rhythm. [id number] Score: 2. The patients heart rate in the last 24 hours is as follows:  Pulse  Min: 51  Max: 95     Antiarrhythmics  , Daily, Oral  , Daily, Oral  amiodarone tablet 200 mg, Daily, Oral  metoprolol succinate (TOPROL-XL) 24 hr tablet 25 mg, Daily, Oral    Anticoagulants       Plan  - Replete lytes with a goal of K>4, Mg >2  - Patient is not anticoagulated due to risk of bleeding  - Patient's afib is currently controlled

## 2025-03-30 NOTE — PLAN OF CARE
Problem: Adult Inpatient Plan of Care  Goal: Plan of Care Review  Outcome: Progressing  Goal: Patient-Specific Goal (Individualized)  Outcome: Progressing  Goal: Absence of Hospital-Acquired Illness or Injury  Outcome: Progressing  Goal: Optimal Comfort and Wellbeing  Outcome: Progressing  Goal: Readiness for Transition of Care  Outcome: Progressing   AAOX4, VSS,NAD noted. No adverse events noted throughout this shift. Safety and plan of care education provided to patient, pt verbalizes understanding. Pt up to chair for breakfast and lunch. Wound care completed per orders. Pressure injury noted to pt left hip, Wound images and assessment added to LDA. Wound care consult placed, Charge nurse called to bedside for second eyes. Pt tolerated all scheduled medication well. All questions and concerns addressed and answer Weight shifting provided q2h. Pt denies any needs or discomfort. Safety maintained throughout shift and call light within reach at all times.

## 2025-03-30 NOTE — SUBJECTIVE & OBJECTIVE
Interval History: NAEON. Pt seen and evaluated at bedside this am.  Pt is doing well this am. Hgb stable. CLD today and NPO at mn. Golytely prep this evening. On review of Pt's Cr hx he appears at baseline.     Review of Systems   Constitutional:  Negative for activity change, chills, diaphoresis, fatigue and fever.   HENT:  Negative for congestion, rhinorrhea and sore throat.    Respiratory:  Positive for cough (chronic, productive). Negative for chest tightness, shortness of breath and wheezing.    Cardiovascular:  Negative for chest pain, palpitations and leg swelling.   Gastrointestinal:  Negative for abdominal distention, abdominal pain, blood in stool, constipation, diarrhea, nausea and vomiting.   Genitourinary:  Negative for difficulty urinating, dysuria, frequency, hematuria and urgency.   Musculoskeletal:  Negative for arthralgias, back pain, gait problem and neck stiffness.   Neurological:  Negative for dizziness, tremors, seizures, syncope, weakness, light-headedness, numbness and headaches.   Psychiatric/Behavioral:  Negative for agitation, confusion and hallucinations.      Objective:     Vital Signs (Most Recent):  Temp: 97.7 °F (36.5 °C) (03/30/25 0837)  Pulse: (!) 57 (03/30/25 0837)  Resp: 18 (03/30/25 0837)  BP: (!) 134/38 (03/30/25 0837)  SpO2: 96 % (03/30/25 0837) Vital Signs (24h Range):  Temp:  [97.6 °F (36.4 °C)-98.8 °F (37.1 °C)] 97.7 °F (36.5 °C)  Pulse:  [51-95] 57  Resp:  [18] 18  SpO2:  [91 %-96 %] 96 %  BP: (134-167)/(38-84) 134/38     Weight: 45 kg (99 lb 3.3 oz)  Body mass index is 16.01 kg/m².  No intake or output data in the 24 hours ending 03/30/25 1022      Physical Exam  Vitals and nursing note reviewed.   Constitutional:       General: He is not in acute distress.     Appearance: He is cachectic. He is not diaphoretic.   HENT:      Head: Normocephalic and atraumatic.      Right Ear: External ear normal.      Left Ear: External ear normal.      Nose: Nose normal. No congestion.       Mouth/Throat:      Pharynx: Oropharynx is clear.   Eyes:      General: No scleral icterus.     Extraocular Movements: Extraocular movements intact.   Cardiovascular:      Rate and Rhythm: Normal rate and regular rhythm.      Pulses: Normal pulses.      Heart sounds: Normal heart sounds. No murmur heard.  Pulmonary:      Effort: Pulmonary effort is normal. No respiratory distress.      Breath sounds: Normal breath sounds. No wheezing or rales.   Abdominal:      General: Bowel sounds are normal. There is no distension.      Palpations: Abdomen is soft.      Tenderness: There is no abdominal tenderness. There is no guarding or rebound.   Musculoskeletal:      Cervical back: Normal range of motion.      Right lower leg: No edema.      Left lower leg: No edema.   Skin:     General: Skin is warm and dry.      Capillary Refill: Capillary refill takes less than 2 seconds.   Neurological:      General: No focal deficit present.      Mental Status: He is alert and oriented to person, place, and time. Mental status is at baseline.   Psychiatric:         Mood and Affect: Mood normal.         Behavior: Behavior normal.         Thought Content: Thought content normal.               Significant Labs: All pertinent labs within the past 24 hours have been reviewed.    Significant Imaging: I have reviewed all pertinent imaging results/findings within the past 24 hours.

## 2025-03-30 NOTE — PROGRESS NOTES
[facility]  Progress Note    Patient Name: Zelalem Gamez  MRN: [id number]  Patient Class: IP- Inpatient   Admission Date: 3/27/2025  Length of Stay: 2 days  Attending Physician: Daisy Sadnoval MD  Primary Care Provider: Deysi Pink MD        Subjective     Principal Problem:Symptomatic anemia        HPI:  Mr. Zelalem Gamez arpit 74 year old male with a pMHx of FrEF (EF 35%), hypertension, peripheral arterial disease, chronic kidney disease on hemodialysis (MWF), seizure disorder who presentsafter routine labs revealed hemoglobin of 6.6. He is currently residing at a SNF after being discharged from the hospital on 03/07. Patient reports he has been having persistent constipation for several weeks. He has not noticed any blood in his stools, however, he reports that a nurse changes his briefs for him. He has been able to get out of bed with the help of PT but is mostly confined to his bed due to generalized weakness that has been ongoing since he was discharged from the hospital. He also endorses nasal congestion and cough with sputum production for several weeks. He is on 1-2L of O2 at baseline. Patient was admitted from 2/21-03/07 after a syncopal event. He was found to have several issues going on, including acute blood loss anemia requiring 2 units PRBCs. At this time, he also had an ANGEL on CKD along with rhabdomyolysis, so a tunneled catheter was placed and he was started on dialysis. He has been going to dialysis regularly. He denies headache, fever, chills, hemoptysis, decreased appetite, chest pain, abdominal pain, nausea, vomiting, dysuria, hematuria, or lower extremity pain/swelling.      In ED, Pt AFVSS on 1L NC. Hgb 6.6>6.8. No leukocytosis. K 3.4. Cr 3, previously 2.8. Trop 19. CXR: Findings suggestive of pulmonary edema with bilateral pleural effusions, left greater than right. Admitted to  for c/f GIB.     Overview/Hospital Course:  Pt  admitted for evaluation of symptomatic anemia from clinic w/ Hgb 6.6. Pt afebrile and HDS. Started on GIB pathway and transfused 1 unit PRBC. IV protonix. GI consulted and tentatively plan for scope 3/31. CTA c/f stercoral proctitis w/ large stool burden. Discussed w/ CRS, patient stable, will evacuate stool and continue to monitor. Cr at baseline.  Also w/ b/l pleural effusions was diuresed following transfusion.    Interval History: NAEON. Pt seen and evaluated at bedside this am.  Pt is doing well this am. Hgb stable. CLD today and NPO at mn. Golytely prep this evening. On review of Pt's Cr hx he appears at baseline.     Review of Systems   Constitutional:  Negative for activity change, chills, diaphoresis, fatigue and fever.   HENT:  Negative for congestion, rhinorrhea and sore throat.    Respiratory:  Positive for cough (chronic, productive). Negative for chest tightness, shortness of breath and wheezing.    Cardiovascular:  Negative for chest pain, palpitations and leg swelling.   Gastrointestinal:  Negative for abdominal distention, abdominal pain, blood in stool, constipation, diarrhea, nausea and vomiting.   Genitourinary:  Negative for difficulty urinating, dysuria, frequency, hematuria and urgency.   Musculoskeletal:  Negative for arthralgias, back pain, gait problem and neck stiffness.   Neurological:  Negative for dizziness, tremors, seizures, syncope, weakness, light-headedness, numbness and headaches.   Psychiatric/Behavioral:  Negative for agitation, confusion and hallucinations.      Objective:     Vital Signs (Most Recent):  Temp: 97.7 °F (36.5 °C) (03/30/25 0837)  Pulse: (!) 57 (03/30/25 0837)  Resp: 18 (03/30/25 0837)  BP: (!) 134/38 (03/30/25 0837)  SpO2: 96 % (03/30/25 0837) Vital Signs (24h Range):  Temp:  [97.6 °F (36.4 °C)-98.8 °F (37.1 °C)] 97.7 °F (36.5 °C)  Pulse:  [51-95] 57  Resp:  [18] 18  SpO2:  [91 %-96 %] 96 %  BP: (134-167)/(38-84) 134/38     Weight: 45 kg (99 lb 3.3 oz)  Body mass  index is 16.01 kg/m².  No intake or output data in the 24 hours ending 03/30/25 1022      Physical Exam  Vitals and nursing note reviewed.   Constitutional:       General: He is not in acute distress.     Appearance: He is cachectic. He is not diaphoretic.   HENT:      Head: Normocephalic and atraumatic.      Right Ear: External ear normal.      Left Ear: External ear normal.      Nose: Nose normal. No congestion.      Mouth/Throat:      Pharynx: Oropharynx is clear.   Eyes:      General: No scleral icterus.     Extraocular Movements: Extraocular movements intact.   Cardiovascular:      Rate and Rhythm: Normal rate and regular rhythm.      Pulses: Normal pulses.      Heart sounds: Normal heart sounds. No murmur heard.  Pulmonary:      Effort: Pulmonary effort is normal. No respiratory distress.      Breath sounds: Normal breath sounds. No wheezing or rales.   Abdominal:      General: Bowel sounds are normal. There is no distension.      Palpations: Abdomen is soft.      Tenderness: There is no abdominal tenderness. There is no guarding or rebound.   Musculoskeletal:      Cervical back: Normal range of motion.      Right lower leg: No edema.      Left lower leg: No edema.   Skin:     General: Skin is warm and dry.      Capillary Refill: Capillary refill takes less than 2 seconds.   Neurological:      General: No focal deficit present.      Mental Status: He is alert and oriented to person, place, and time. Mental status is at baseline.   Psychiatric:         Mood and Affect: Mood normal.         Behavior: Behavior normal.         Thought Content: Thought content normal.               Significant Labs: All pertinent labs within the past 24 hours have been reviewed.    Significant Imaging: I have reviewed all pertinent imaging results/findings within the past 24 hours.      Assessment & Plan  Symptomatic anemia  Anemia is likely due to  unknown suspect GI . Most recent hemoglobin and hematocrit are listed  below.  Recent Labs     03/28/25  0610 03/29/25  0855 03/30/25  0257   HGB 9.3* 9.5* 9.6*   HCT 28.6* 29.5* 29.8*     Plan  - Monitor serial CBC: Every 8 hours  - Transfuse PRBC if patient becomes hemodynamically unstable, symptomatic or H/H drops below 7/21.  - Patient has received 1 units of PRBCs on 3/27  - Patient's anemia is currently stable  - CLD, NPO at mn  - IV Protonix 40mg BID  - IVF hydration   - 2 large bore IV access  - anti-emetics as needed  - GI consulted, appreciate recs   - Plan for EGD and colonoscopy Monday                                                                        - NPO midnight   - Golytely prep to start at 6pm, to be completed within 4 hours if possible  Seizure disorder  - continue phenobarbital and zonisamide  Stage 3b chronic kidney disease  Creatine stable for now. BMP reviewed- noted Estimated Creatinine Clearance: 11.6 mL/min (A) (based on SCr of 3.5 mg/dL (H)). according to latest data. Based on current GFR, CKD stage is stage 3 - GFR 30-59.  Monitor UOP and serial BMP and adjust therapy as needed. Renally dose meds. Avoid nephrotoxic medications and procedures.    ANGEL is likely due to pre-renal azotemia due to dehydration. Baseline creatinine is  2.8 . Most recent creatinine and eGFR are listed below.  Recent Labs     03/28/25  0610 03/29/25  0519 03/30/25  0257   CREATININE 3.2* 3.4* 3.5*   EGFRNORACEVR 19* 18* 17*      Plan  - ANGEL is worsening. Will adjust treatment as follows: 500 ml NS bolus  - Avoid nephrotoxins and renally dose meds for GFR listed above  - Monitor urine output, serial BMP, and adjust therapy as needed  Generalized weakness  - PTOT    Atrial fibrillation  Patient has paroxysmal (<7 days) atrial fibrillation. Patient is currently in sinus rhythm. [id number] Score: 2. The patients heart rate in the last 24 hours is as follows:  Pulse  Min: 51  Max: 95     Antiarrhythmics  , Daily, Oral  , Daily, Oral  amiodarone tablet 200 mg, Daily, Oral  metoprolol  succinate (TOPROL-XL) 24 hr tablet 25 mg, Daily, Oral    Anticoagulants       Plan  - Replete lytes with a goal of K>4, Mg >2  - Patient is not anticoagulated due to risk of bleeding  - Patient's afib is currently controlled    Pleural effusion  Patient found to have small pleural effusion on imaging. I have personally reviewed and interpreted the following imaging: Xray. A thoracentesis was deferred. Most likely etiology includes Congestive Heart Failure. Management to include Diuresis    - lasix 40 mg x1  - echo pending  Stercoral colitis  Constipation  - bowel regimen for stool evacuation and continue monitoring    Tobacco dependency  Dangers of cigarette smoking were reviewed with patient in detail. Patient was Counseled for 3-10 minutes. Nicotine replacement options were discussed. Nicotine replacement was discussed- not prescribed per patient's request  VTE Risk Mitigation (From admission, onward)           Ordered     Place sequential compression device  Until discontinued         03/27/25 1704     IP VTE HIGH RISK PATIENT  Once         03/27/25 1704     Place sequential compression device  Until discontinued         03/27/25 1704     Reason for no Mechanical VTE Prophylaxis  Once        Question:  Reasons:  Answer:  Risk of Bleeding    03/27/25 1704                    Discharge Planning   МАРИНА: 4/1/2025     Code Status: Full Code   Medical Readiness for Discharge Date:   Discharge Plan A: Skilled Nursing Facility                Please place Justification for DME        Nestor Snow PA-C  Department of Hospital Medicine   [facility] - [facility] Medicine Telemetry

## 2025-03-30 NOTE — ASSESSMENT & PLAN NOTE
Anemia is likely due to unknown suspect GI. Most recent hemoglobin and hematocrit are listed below.  Recent Labs     03/28/25  0610 03/29/25  0855 03/30/25  0257   HGB 9.3* 9.5* 9.6*   HCT 28.6* 29.5* 29.8*     Plan  - Monitor serial CBC: Every 8 hours  - Transfuse PRBC if patient becomes hemodynamically unstable, symptomatic or H/H drops below 7/21.  - Patient has received 1 units of PRBCs on 3/27  - Patient's anemia is currently stable  - CLD, NPO at mn  - IV Protonix 40mg BID  - IVF hydration   - 2 large bore IV access  - anti-emetics as needed  - GI consulted, appreciate recs   - Plan for EGD and colonoscopy Monday                                                                        - NPO midnight   - Golytely prep to start at 6pm, to be completed within 4 hours if possible

## 2025-03-30 NOTE — ASSESSMENT & PLAN NOTE
Creatine stable for now. BMP reviewed- noted Estimated Creatinine Clearance: 11.6 mL/min (A) (based on SCr of 3.5 mg/dL (H)). according to latest data. Based on current GFR, CKD stage is stage 3 - GFR 30-59.  Monitor UOP and serial BMP and adjust therapy as needed. Renally dose meds. Avoid nephrotoxic medications and procedures.    ANGEL is likely due to pre-renal azotemia due to dehydration. Baseline creatinine is 2.8. Most recent creatinine and eGFR are listed below.  Recent Labs     03/28/25  0610 03/29/25  0519 03/30/25  0257   CREATININE 3.2* 3.4* 3.5*   EGFRNORACEVR 19* 18* 17*      Plan  - ANGEL is worsening. Will adjust treatment as follows: 500 ml NS bolus  - Avoid nephrotoxins and renally dose meds for GFR listed above  - Monitor urine output, serial BMP, and adjust therapy as needed

## 2025-03-31 ENCOUNTER — ANESTHESIA (OUTPATIENT)
Dept: ENDOSCOPY | Facility: HOSPITAL | Age: 75
End: 2025-03-31
Payer: MEDICARE

## 2025-03-31 PROBLEM — J96.21 ACUTE ON CHRONIC RESPIRATORY FAILURE WITH HYPOXIA: Status: ACTIVE | Noted: 2025-03-31

## 2025-03-31 PROBLEM — R54 AGE-RELATED PHYSICAL DEBILITY: Status: ACTIVE | Noted: 2025-03-31

## 2025-03-31 PROBLEM — R64 CACHEXIA: Status: ACTIVE | Noted: 2025-03-31

## 2025-03-31 LAB
ABSOLUTE EOSINOPHIL (OHS): 0.1 K/UL
ABSOLUTE MONOCYTE (OHS): 0.64 K/UL (ref 0.3–1)
ABSOLUTE NEUTROPHIL COUNT (OHS): 7.32 K/UL (ref 1.8–7.7)
ANION GAP (OHS): 9 MMOL/L (ref 8–16)
BACTERIA UR CULT: ABNORMAL
BASOPHILS # BLD AUTO: 0.05 K/UL
BASOPHILS NFR BLD AUTO: 0.6 %
BUN SERPL-MCNC: 32 MG/DL (ref 8–23)
CALCIUM SERPL-MCNC: 7.5 MG/DL (ref 8.7–10.5)
CHLORIDE SERPL-SCNC: 102 MMOL/L (ref 95–110)
CO2 SERPL-SCNC: 25 MMOL/L (ref 23–29)
CREAT SERPL-MCNC: 3.5 MG/DL (ref 0.5–1.4)
ERYTHROCYTE [DISTWIDTH] IN BLOOD BY AUTOMATED COUNT: 21.7 % (ref 11.5–14.5)
GFR SERPLBLD CREATININE-BSD FMLA CKD-EPI: 17 ML/MIN/1.73/M2
GLUCOSE SERPL-MCNC: 74 MG/DL (ref 70–110)
HCT VFR BLD AUTO: 27.2 % (ref 40–54)
HGB BLD-MCNC: 8.6 GM/DL (ref 14–18)
IMM GRANULOCYTES # BLD AUTO: 0.08 K/UL (ref 0–0.04)
IMM GRANULOCYTES NFR BLD AUTO: 0.9 % (ref 0–0.5)
LYMPHOCYTES # BLD AUTO: 0.78 K/UL (ref 1–4.8)
MAGNESIUM SERPL-MCNC: 2.6 MG/DL (ref 1.6–2.6)
MCH RBC QN AUTO: 30.6 PG (ref 27–50)
MCHC RBC AUTO-ENTMCNC: 31.6 G/DL (ref 32–36)
MCV RBC AUTO: 97 FL (ref 82–98)
NUCLEATED RBC (/100WBC) (OHS): 0 /100 WBC
PLATELET # BLD AUTO: 264 K/UL (ref 150–450)
PMV BLD AUTO: 10.4 FL (ref 9.2–12.9)
POCT GLUCOSE: 163 MG/DL (ref 70–110)
POTASSIUM SERPL-SCNC: 3.4 MMOL/L (ref 3.5–5.1)
RBC # BLD AUTO: 2.81 M/UL (ref 4.6–6.2)
RELATIVE EOSINOPHIL (OHS): 1.1 %
RELATIVE LYMPHOCYTE (OHS): 8.7 % (ref 18–48)
RELATIVE MONOCYTE (OHS): 7.1 % (ref 4–15)
RELATIVE NEUTROPHIL (OHS): 81.6 % (ref 38–73)
SODIUM SERPL-SCNC: 136 MMOL/L (ref 136–145)
WBC # BLD AUTO: 8.97 K/UL (ref 3.9–12.7)

## 2025-03-31 PROCEDURE — 80048 BASIC METABOLIC PNL TOTAL CA: CPT | Performed by: PHYSICIAN ASSISTANT

## 2025-03-31 PROCEDURE — 25000003 PHARM REV CODE 250: Performed by: PHYSICIAN ASSISTANT

## 2025-03-31 PROCEDURE — 83735 ASSAY OF MAGNESIUM: CPT | Performed by: PHYSICIAN ASSISTANT

## 2025-03-31 PROCEDURE — 43235 EGD DIAGNOSTIC BRUSH WASH: CPT | Performed by: STUDENT IN AN ORGANIZED HEALTH CARE EDUCATION/TRAINING PROGRAM

## 2025-03-31 PROCEDURE — 63600175 PHARM REV CODE 636 W HCPCS: Performed by: PHYSICIAN ASSISTANT

## 2025-03-31 PROCEDURE — 27000221 HC OXYGEN, UP TO 24 HOURS

## 2025-03-31 PROCEDURE — 94761 N-INVAS EAR/PLS OXIMETRY MLT: CPT

## 2025-03-31 PROCEDURE — 37000009 HC ANESTHESIA EA ADD 15 MINS: Performed by: STUDENT IN AN ORGANIZED HEALTH CARE EDUCATION/TRAINING PROGRAM

## 2025-03-31 PROCEDURE — 97535 SELF CARE MNGMENT TRAINING: CPT

## 2025-03-31 PROCEDURE — 43235 EGD DIAGNOSTIC BRUSH WASH: CPT | Mod: ,,, | Performed by: STUDENT IN AN ORGANIZED HEALTH CARE EDUCATION/TRAINING PROGRAM

## 2025-03-31 PROCEDURE — 25000003 PHARM REV CODE 250: Performed by: STUDENT IN AN ORGANIZED HEALTH CARE EDUCATION/TRAINING PROGRAM

## 2025-03-31 PROCEDURE — 63600175 PHARM REV CODE 636 W HCPCS: Performed by: STUDENT IN AN ORGANIZED HEALTH CARE EDUCATION/TRAINING PROGRAM

## 2025-03-31 PROCEDURE — 0DJ08ZZ INSPECTION OF UPPER INTESTINAL TRACT, VIA NATURAL OR ARTIFICIAL OPENING ENDOSCOPIC: ICD-10-PCS | Performed by: STUDENT IN AN ORGANIZED HEALTH CARE EDUCATION/TRAINING PROGRAM

## 2025-03-31 PROCEDURE — 37000008 HC ANESTHESIA 1ST 15 MINUTES: Performed by: STUDENT IN AN ORGANIZED HEALTH CARE EDUCATION/TRAINING PROGRAM

## 2025-03-31 PROCEDURE — 36415 COLL VENOUS BLD VENIPUNCTURE: CPT | Performed by: PHYSICIAN ASSISTANT

## 2025-03-31 PROCEDURE — 85025 COMPLETE CBC W/AUTO DIFF WBC: CPT | Performed by: PHYSICIAN ASSISTANT

## 2025-03-31 PROCEDURE — 21400001 HC TELEMETRY ROOM

## 2025-03-31 PROCEDURE — 97110 THERAPEUTIC EXERCISES: CPT

## 2025-03-31 RX ORDER — POTASSIUM CHLORIDE 20 MEQ/1
40 TABLET, EXTENDED RELEASE ORAL ONCE
Status: COMPLETED | OUTPATIENT
Start: 2025-03-31 | End: 2025-03-31

## 2025-03-31 RX ORDER — LIDOCAINE HYDROCHLORIDE 20 MG/ML
INJECTION INTRAVENOUS
Status: DISCONTINUED | OUTPATIENT
Start: 2025-03-31 | End: 2025-03-31

## 2025-03-31 RX ORDER — GLUCAGON 1 MG
1 KIT INJECTION
Status: DISCONTINUED | OUTPATIENT
Start: 2025-03-31 | End: 2025-03-31 | Stop reason: HOSPADM

## 2025-03-31 RX ORDER — PROPOFOL 10 MG/ML
VIAL (ML) INTRAVENOUS
Status: DISCONTINUED | OUTPATIENT
Start: 2025-03-31 | End: 2025-03-31

## 2025-03-31 RX ORDER — DEXMEDETOMIDINE HYDROCHLORIDE 100 UG/ML
INJECTION, SOLUTION INTRAVENOUS
Status: DISCONTINUED | OUTPATIENT
Start: 2025-03-31 | End: 2025-03-31

## 2025-03-31 RX ORDER — SULFAMETHOXAZOLE AND TRIMETHOPRIM 800; 160 MG/1; MG/1
1 TABLET ORAL ONCE
Status: COMPLETED | OUTPATIENT
Start: 2025-03-31 | End: 2025-03-31

## 2025-03-31 RX ORDER — SULFAMETHOXAZOLE AND TRIMETHOPRIM 400; 80 MG/1; MG/1
1 TABLET ORAL EVERY 12 HOURS
Status: DISCONTINUED | OUTPATIENT
Start: 2025-04-01 | End: 2025-04-03

## 2025-03-31 RX ORDER — FUROSEMIDE 10 MG/ML
40 INJECTION INTRAMUSCULAR; INTRAVENOUS DAILY
Status: DISCONTINUED | OUTPATIENT
Start: 2025-03-31 | End: 2025-04-03

## 2025-03-31 RX ORDER — SODIUM CHLORIDE 0.9 % (FLUSH) 0.9 %
3 SYRINGE (ML) INJECTION EVERY 4 HOURS PRN
Status: DISCONTINUED | OUTPATIENT
Start: 2025-03-31 | End: 2025-03-31 | Stop reason: HOSPADM

## 2025-03-31 RX ADMIN — PHENOBARBITAL 32.4 MG: 32.4 TABLET ORAL at 12:03

## 2025-03-31 RX ADMIN — FUROSEMIDE 40 MG: 10 INJECTION, SOLUTION INTRAVENOUS at 06:03

## 2025-03-31 RX ADMIN — SODIUM CHLORIDE: 9 INJECTION, SOLUTION INTRAVENOUS at 10:03

## 2025-03-31 RX ADMIN — ARIPIPRAZOLE 2 MG: 2 TABLET ORAL at 11:03

## 2025-03-31 RX ADMIN — FERROUS SULFATE TAB 325 MG (65 MG ELEMENTAL FE) 1 EACH: 325 (65 FE) TAB at 11:03

## 2025-03-31 RX ADMIN — DEXMEDETOMIDINE 4 MCG: 100 INJECTION, SOLUTION, CONCENTRATE INTRAVENOUS at 10:03

## 2025-03-31 RX ADMIN — SEVELAMER CARBONATE 800 MG: 800 TABLET, FILM COATED ORAL at 04:03

## 2025-03-31 RX ADMIN — SULFAMETHOXAZOLE AND TRIMETHOPRIM 1 TABLET: 800; 160 TABLET ORAL at 06:03

## 2025-03-31 RX ADMIN — HYDRALAZINE HYDROCHLORIDE 50 MG: 50 TABLET ORAL at 06:03

## 2025-03-31 RX ADMIN — SEVELAMER CARBONATE 800 MG: 800 TABLET, FILM COATED ORAL at 11:03

## 2025-03-31 RX ADMIN — PANTOPRAZOLE SODIUM 40 MG: 40 INJECTION, POWDER, FOR SOLUTION INTRAVENOUS at 09:03

## 2025-03-31 RX ADMIN — AMIODARONE HYDROCHLORIDE 200 MG: 200 TABLET ORAL at 11:03

## 2025-03-31 RX ADMIN — ZONISAMIDE 100 MG: 100 CAPSULE ORAL at 11:03

## 2025-03-31 RX ADMIN — POTASSIUM CHLORIDE 40 MEQ: 1500 TABLET, EXTENDED RELEASE ORAL at 11:03

## 2025-03-31 RX ADMIN — POTASSIUM CHLORIDE 40 MEQ: 1500 TABLET, EXTENDED RELEASE ORAL at 05:03

## 2025-03-31 RX ADMIN — PROPOFOL 30 MG: 10 INJECTION, EMULSION INTRAVENOUS at 10:03

## 2025-03-31 RX ADMIN — Medication 2000 UNITS: at 11:03

## 2025-03-31 RX ADMIN — SENNOSIDES 8.6 MG: 8.6 TABLET, FILM COATED ORAL at 11:03

## 2025-03-31 RX ADMIN — POLYETHYLENE GLYCOL 3350 17 G: 17 POWDER, FOR SOLUTION ORAL at 11:03

## 2025-03-31 RX ADMIN — HYDRALAZINE HYDROCHLORIDE 50 MG: 50 TABLET ORAL at 04:03

## 2025-03-31 RX ADMIN — Medication 250 MG: at 11:03

## 2025-03-31 RX ADMIN — PANTOPRAZOLE SODIUM 40 MG: 40 INJECTION, POWDER, FOR SOLUTION INTRAVENOUS at 11:03

## 2025-03-31 RX ADMIN — LIDOCAINE HYDROCHLORIDE 50 MG: 20 INJECTION INTRAVENOUS at 10:03

## 2025-03-31 RX ADMIN — DEXMEDETOMIDINE 2 MCG: 100 INJECTION, SOLUTION, CONCENTRATE INTRAVENOUS at 10:03

## 2025-03-31 RX ADMIN — HYDRALAZINE HYDROCHLORIDE 50 MG: 50 TABLET ORAL at 09:03

## 2025-03-31 RX ADMIN — PROPOFOL 40 MG: 10 INJECTION, EMULSION INTRAVENOUS at 10:03

## 2025-03-31 RX ADMIN — AMLODIPINE BESYLATE 10 MG: 10 TABLET ORAL at 11:03

## 2025-03-31 NOTE — H&P
Short Stay Endoscopy History and Physical    PCP - Deysi Pink MD     Procedure - EGD  ASA - per anesthesia  Mallampati - per anesthesia  History of Anesthesia problems - no  Family history Anesthesia problems -  no   Plan of anesthesia - General    HPI:  This is a 74 y.o. male here for evaluation of :     KIRT, weight loss      ROS:  Constitutional: No fevers, chills, No weight loss  CV: No chest pain  Pulm: No cough, No shortness of breath  Ophtho: No vision changes  GI: see HPI  Derm: No rash    Medical History:  has a past medical history of Arthritis, Epilepsy, Hyperglycemia, and Stroke.    Surgical History:  has a past surgical history that includes Joint replacement; Skin cancer excision; and Cholecystectomy.    Family History: family history includes Diabetes in his mother.. Otherwise no colon cancer, inflammatory bowel disease, or GI malignancies.    Social History:  reports that he has been smoking cigarettes. He has a 45 pack-year smoking history. He has never used smokeless tobacco. He reports that he does not drink alcohol and does not use drugs.    Review of patient's allergies indicates:  No Known Allergies    Medications:   Prescriptions Prior to Admission[1]    Physical Exam:    Vital Signs:   Vitals:    03/31/25 0731   BP: (!) 127/42   Pulse: (!) 55   Resp: 18   Temp: 98.5 °F (36.9 °C)       General Appearance: Well appearing in no acute distress  Eyes:    No scleral icterus  ENT: Neck supple, Lips, mucosa, and tongue normal; teeth and gums normal  Abdomen: Soft, non tender, non distended with normal bowel sounds. No hepatosplenomegaly, ascites, or mass.  Extremities: No edema  Skin: No rash    Labs:  Lab Results   Component Value Date    WBC 8.97 03/31/2025    HGB 8.6 (L) 03/31/2025    HCT 27.2 (L) 03/31/2025     03/31/2025    CHOL 147 02/23/2025    TRIG 106 02/23/2025    HDL 57 02/23/2025    ALT 11 03/27/2025    AST 21 03/27/2025     03/31/2025    K 3.4 (L) 03/31/2025    CL  102 03/31/2025    CREATININE 3.5 (H) 03/31/2025    BUN 32 (H) 03/31/2025    CO2 25 03/31/2025    TSH 3.16 02/23/2025    INR 1.1 03/05/2025    HGBA1C 5.0 05/03/2022       I have explained the risks and benefits of endoscopy procedures to the patient including but not limited to bleeding, perforation, infection, and death.  The patient was asked if they understand and allowed to ask any further questions to their satisfaction.      Beverly Kimball MD         [1]   Medications Prior to Admission   Medication Sig Dispense Refill Last Dose/Taking    zinc sulfate (ZINCATE) 50 mg zinc (220 mg) capsule Take 1 capsule by mouth once daily.   Taking    acetaminophen (TYLENOL) 500 MG tablet Take 1 tablet (500 mg total) by mouth every 6 (six) hours as needed for Pain. 30 tablet 0     amiodarone (PACERONE) 200 MG Tab Take 200 mg by mouth once daily.       ARIPiprazole (ABILIFY) 2 MG Tab Take 2 mg by mouth once daily.       ascorbic acid, vitamin C, (VITAMIN C) 250 MG tablet Take 1 tablet (250 mg total) by mouth once daily.       ferrous sulfate (FEOSOL) Tab tablet Take 1 tablet (1 each total) by mouth once daily. 30 tablet 3     gabapentin (NEURONTIN) 100 MG capsule Take 100 mg by mouth 2 (two) times daily.       metoprolol succinate (TOPROL-XL) 25 MG 24 hr tablet Take 25 mg by mouth once daily.       PHENobarbitaL (LUMINAL) 16.2 MG tablet Take 32 mg by mouth once daily.       sevelamer carbonate (RENVELA) 800 mg Tab Take 1 tablet (800 mg total) by mouth 3 (three) times daily with meals. 90 tablet 3     vitamin D (VITAMIN D3) 1000 units Tab Take 2 tablets (2,000 Units total) by mouth once daily.       zonisamide (ZONEGRAN) 100 MG Cap Take by mouth.

## 2025-03-31 NOTE — PLAN OF CARE
Problem: Adult Inpatient Plan of Care  Goal: Plan of Care Review  Outcome: Progressing  Goal: Patient-Specific Goal (Individualized)  Outcome: Progressing  Goal: Absence of Hospital-Acquired Illness or Injury  Outcome: Progressing  Goal: Optimal Comfort and Wellbeing  Outcome: Progressing  Goal: Readiness for Transition of Care  Outcome: Progressing    AAOX4,VSS,NAD noted. No adverse events noted throughout this shift. Pt tolerated all scheduled medication well. Plan of care and safety education provided to patient. All questions and concerns addressed and answered. Pt started on golytely at 1800, pt education on the importance of drinking cleanse. Pt verbalized understanding. Safety maintained all throughout shift, call light within reach at all times.

## 2025-03-31 NOTE — ASSESSMENT & PLAN NOTE
Patient found to have small pleural effusion on imaging. I have personally reviewed and interpreted the following imaging: Xray. A thoracentesis was deferred. Most likely etiology includes Congestive Heart Failure. Management to include Diuresis    Patient with Hypoxic Respiratory failure which is Acute on chronic.  he is on home oxygen at 1 LPM. Supplemental oxygen was provided and noted- Oxygen Concentration (%):  [3] 3 Contributing diagnoses includes - CHF and Pleural effusion Labs and images were reviewed. Patient Has not had a recent ABG. Will treat underlying causes and adjust management of respiratory failure as follows    - lasix 40 mg IV qd to resume following scope  - echo Mild global hypokinesis present. There is mildly reduced systolic function. Ejection fraction is approximately 45%. There is indeterminate diastolic function.

## 2025-03-31 NOTE — ASSESSMENT & PLAN NOTE
Anemia is likely due to unknown suspect GI. Most recent hemoglobin and hematocrit are listed below.  Recent Labs     03/29/25  0855 03/30/25  0257 03/31/25  0228   HGB 9.5* 9.6* 8.6*   HCT 29.5* 29.8* 27.2*     Plan  - Monitor serial CBC: Every 8 hours  - Transfuse PRBC if patient becomes hemodynamically unstable, symptomatic or H/H drops below 7/21.  - Patient has received 1 units of PRBCs on 3/27  - Patient's anemia is currently stable  - CLD, NPO at mn  - IV Protonix 40mg BID  - IVF hydration   - 2 large bore IV access  - anti-emetics as needed  - GI consulted, appreciate recs   - Plan for EGD and colonoscopy today

## 2025-03-31 NOTE — CONSULTS
[facility] - Internal Medicine Telemetry    Wound Care     Patient Name:  Zelalem Gamez  MRN:  [id number]  Date: 3/31/2025  Diagnosis: Symptomatic anemia     History:  Past Medical History:   Diagnosis Date    Arthritis     Epilepsy     Hyperglycemia     Stroke      Social History[1]  Precautions:  Allergies as of 03/27/2025    (No Known Allergies)       [facility] Assessment Details / Treatment:    Patient seen for wound care: New Consult   Chart reviewed for this encounter.   Labs:   WBC (K/uL)   Date Value   03/31/2025 8.97   03/30/2025 10.87     Glucose (mg/dL)   Date Value   10/26/2021 89   10/22/2021 88     Albumin   Date Value   03/27/2025 2.0 g/dL (L)   03/07/2025 2.9 g/dL (L)   03/06/2025 2.7 g/dL (L)   10/08/2021 3.7 g/dL   10/28/2010 3.5 g/dl       Juan Antonio Score: 15    Narrative:  Pt seen for WC consultation and agreed to assessment. PT awake and alert lying in bed. Male purwick intact. Pt turns with minimum assist. Heel lift boot on Left foot.     Chart reviewed for this encounter.   See Flow Sheet for additional documentation and media.    Left heel: Removed previous foam border. Wound bed with mix of wet an dry slough. Cleansed with saline. Applied medihoney, covered with foam border.     Left hip: Removed previous foam border. Cleasned with saline. Wound bed moist with slough. Applied Aquacel Ag to wound bed, secured with foam border.     Sacrum with blanchable erythema           RECOMMENDATIONS:  Bedside nurse assess for acute changes (purulence, increased redness/swelling, increased drainage, malodor, increased pain, pallor, necrosis) please contact physician on any acute changes.      Left hip: Cleanse with saline, pat dry with gauze. Apply Aquacel Ag to wound bed, secure with foam border. Change every 3 days and PRN soiling.     Left heel: Continue wound care as ordered.       Bedside nursing to continue care, dressing changes, & continue monitoring.  Bedside nursing to maintain pressure injury prevention  interventions, (PIP).     Recommendations made to primary team for above plan.    Thank you for the consult. Wound Care will continue to follow.       03/31/25 0900        Wound 03/28/25 1150 Pressure Injury Left Heel   Date First Assessed/Time First Assessed: 03/28/25 1150   Present on Original Admission: Yes  Primary Wound Type: Pressure Injury  Side: Left  Location: Heel   Wound Image    Pressure Injury Stage U   Drainage Amount Scant   Drainage Characteristics/Odor Serosanguineous   Appearance Slough;Dry;Moist   Periwound Area Scar tissue   Care Cleansed with:;Sterile normal saline   Dressing Removed;Foam;Applied;Honey        Wound 03/27/25 2326 Pressure Injury Left anterior Greater trochanter   Date First Assessed/Time First Assessed: 03/27/25 2326   Present on Original Admission: Yes  Primary Wound Type: Pressure Injury  Side: Left  Orientation: anterior  Location: Greater trochanter   Wound Image    Pressure Injury Stage U   Dressing Appearance Dry;Intact;Clean;Moist drainage   Drainage Amount Small   Drainage Characteristics/Odor Serosanguineous   Appearance Slough;Moist   Wound Length (cm) 1.5 cm   Wound Width (cm) 1.5 cm   Wound Depth (cm) 0.2 cm   Wound Volume (cm^3) 0.236 cm^3   Wound Surface Area (cm^2) 1.77 cm^2   Care Cleansed with:;Sterile normal saline   Dressing Removed;Foam;Applied;Hydrofiber;Silver                                [1]   Social History  Socioeconomic History    Marital status:    Tobacco Use    Smoking status: Every Day     Current packs/day: 1.00     Average packs/day: 1 pack/day for 45.0 years (45.0 ttl pk-yrs)     Types: Cigarettes    Smokeless tobacco: Never   Substance and Sexual Activity    Alcohol use: No    Drug use: No     Social Drivers of Health     Financial Resource Strain: Low Risk  (3/28/2025)    Overall Financial Resource Strain (CARDIA)     Difficulty of Paying Living Expenses: Not very hard   Food Insecurity: No Food Insecurity (3/28/2025)    Hunger Vital  Sign     Worried About Running Out of Food in the Last Year: Never true     Ran Out of Food in the Last Year: Never true   Transportation Needs: No Transportation Needs (3/28/2025)    PRAPARE - Transportation     Lack of Transportation (Medical): No     Lack of Transportation (Non-Medical): No   Housing Stability: Low Risk  (3/28/2025)    Housing Stability Vital Sign     Unable to Pay for Housing in the Last Year: No     Homeless in the Last Year: No

## 2025-03-31 NOTE — SUBJECTIVE & OBJECTIVE
Interval History: Pt seen and evaluated at bedside this am.  Pt completed bowel prep last night and is going for scope w/ GI today. Hypoxic overnight, CXR w/ persistent volume overload. Will resume diuresis following scope today.     Review of Systems   Constitutional:  Negative for activity change, chills, diaphoresis, fatigue and fever.   HENT:  Negative for congestion, rhinorrhea and sore throat.    Respiratory:  Positive for cough (chronic, productive). Negative for chest tightness, shortness of breath and wheezing.    Cardiovascular:  Negative for chest pain, palpitations and leg swelling.   Gastrointestinal:  Negative for abdominal distention, abdominal pain, blood in stool, constipation, diarrhea, nausea and vomiting.   Genitourinary:  Negative for difficulty urinating, dysuria, frequency, hematuria and urgency.   Musculoskeletal:  Negative for arthralgias, back pain, gait problem and neck stiffness.   Neurological:  Negative for dizziness, tremors, seizures, syncope, weakness, light-headedness, numbness and headaches.   Psychiatric/Behavioral:  Negative for agitation, confusion and hallucinations.      Objective:     Vital Signs (Most Recent):  Temp: 96.8 °F (36 °C) (03/31/25 1056)  Pulse: (!) 52 (03/31/25 1056)  Resp: 18 (03/31/25 1056)  BP: (!) 124/58 (03/31/25 1055)  SpO2: (!) 93 % (03/31/25 1056) Vital Signs (24h Range):  Temp:  [96.8 °F (36 °C)-98.5 °F (36.9 °C)] 96.8 °F (36 °C)  Pulse:  [51-60] 52  Resp:  [16-18] 18  SpO2:  [87 %-95 %] 93 %  BP: (107-165)/(37-69) 124/58     Weight: 45 kg (99 lb 3.3 oz)  Body mass index is 16.01 kg/m².    Intake/Output Summary (Last 24 hours) at 3/31/2025 1104  Last data filed at 3/31/2025 1049  Gross per 24 hour   Intake 630 ml   Output 825 ml   Net -195 ml         Physical Exam  Vitals and nursing note reviewed.   Constitutional:       General: He is not in acute distress.     Appearance: He is cachectic. He is not diaphoretic.   HENT:      Head: Normocephalic and  atraumatic.      Right Ear: External ear normal.      Left Ear: External ear normal.      Nose: Nose normal. No congestion.      Mouth/Throat:      Pharynx: Oropharynx is clear.   Eyes:      General: No scleral icterus.     Extraocular Movements: Extraocular movements intact.   Cardiovascular:      Rate and Rhythm: Normal rate and regular rhythm.      Pulses: Normal pulses.      Heart sounds: Normal heart sounds. No murmur heard.  Pulmonary:      Effort: Pulmonary effort is normal. No respiratory distress.      Breath sounds: Rales present. No wheezing.   Abdominal:      General: Bowel sounds are normal. There is no distension.      Palpations: Abdomen is soft.      Tenderness: There is no abdominal tenderness. There is no guarding or rebound.   Musculoskeletal:      Cervical back: Normal range of motion.      Right lower leg: No edema.      Left lower leg: No edema.   Skin:     General: Skin is warm and dry.      Capillary Refill: Capillary refill takes less than 2 seconds.   Neurological:      General: No focal deficit present.      Mental Status: He is alert and oriented to person, place, and time. Mental status is at baseline.   Psychiatric:         Mood and Affect: Mood normal.         Behavior: Behavior normal.         Thought Content: Thought content normal.               Significant Labs: All pertinent labs within the past 24 hours have been reviewed.    Significant Imaging: I have reviewed all pertinent imaging results/findings within the past 24 hours.

## 2025-03-31 NOTE — ASSESSMENT & PLAN NOTE
Creatine stable for now. BMP reviewed- noted Estimated Creatinine Clearance: 11.6 mL/min (A) (based on SCr of 3.5 mg/dL (H)). according to latest data. Based on current GFR, CKD stage is stage 3 - GFR 30-59.  Monitor UOP and serial BMP and adjust therapy as needed. Renally dose meds. Avoid nephrotoxic medications and procedures.    ANGEL is likely due to pre-renal azotemia due to dehydration. Baseline creatinine is 2.8. Most recent creatinine and eGFR are listed below.  Recent Labs     03/29/25  0519 03/30/25  0257 03/31/25  0759   CREATININE 3.4* 3.5* 3.5*   EGFRNORACEVR 18* 17* 17*      Plan  - ANGEL is worsening. Will adjust treatment as follows: 500 ml NS bolus  - Avoid nephrotoxins and renally dose meds for GFR listed above  - Monitor urine output, serial BMP, and adjust therapy as needed

## 2025-03-31 NOTE — NURSING TRANSFER
Nursing Transfer Note      3/31/2025   11:34 AM    Reason patient is being transferred: post-procedure    Transfer To: 1158    Transfer via stretcher    Transfer with cardiac monitoring, 2 L NC to O2    Transported by PCT    Telemetry: Rhythm SB  Order for Tele Monitor? Yes    Additional Lines: Oxygen    Medicines sent: none    Any special needs or follow-up needed: routine    Patient belongings transferred with patient: No    Chart send with patient: Yes    Notified: sister

## 2025-03-31 NOTE — PLAN OF CARE
Problem: Adult Inpatient Plan of Care  Goal: Plan of Care Review  Outcome: Progressing  Goal: Patient-Specific Goal (Individualized)  Outcome: Progressing  Goal: Absence of Hospital-Acquired Illness or Injury  Outcome: Progressing  Goal: Optimal Comfort and Wellbeing  Outcome: Progressing  Goal: Readiness for Transition of Care  Outcome: Progressing     Problem: Gastrointestinal Bleeding  Goal: Optimal Coping with Acute Illness  Outcome: Progressing  Goal: Hemostasis  Outcome: Progressing     Problem: Infection  Goal: Absence of Infection Signs and Symptoms  Outcome: Progressing     Problem: Acute Kidney Injury/Impairment  Goal: Fluid and Electrolyte Balance  Outcome: Progressing  Goal: Improved Oral Intake  Outcome: Progressing  Goal: Effective Renal Function  Outcome: Progressing     Problem: Wound  Goal: Optimal Coping  Outcome: Progressing  Goal: Optimal Functional Ability  Outcome: Progressing  Goal: Absence of Infection Signs and Symptoms  Outcome: Progressing  Goal: Improved Oral Intake  Outcome: Progressing  Goal: Optimal Pain Control and Function  Outcome: Progressing  Goal: Skin Health and Integrity  Outcome: Progressing  Goal: Optimal Wound Healing  Outcome: Progressing     Problem: Skin Injury Risk Increased  Goal: Skin Health and Integrity  Outcome: Progressing     Problem: Fall Injury Risk  Goal: Absence of Fall and Fall-Related Injury  Outcome: Progressing    Pt difficult to get to drink golytely.  Pt refusing at one point.  Pt appears confused. Small BM noted. Pt being very difficult to drink.

## 2025-03-31 NOTE — PROVATION PATIENT INSTRUCTIONS
Discharge Summary/Instructions after an Endoscopic Procedure  Patient Name: Zelalem Gamez  Patient MRN: [id number]  Patient YOB: 1950 Monday, March 31, 2025  Jose Meyer MD  Dear patient,  As a result of recent federal legislation (The Federal Cures Act), you may   receive lab or pathology results from your procedure in your [facility]   account before your physician is able to contact you. Your physician or   their representative will relay the results to you with their   recommendations at their soonest availability.  Thank you,  RESTRICTIONS:  During your procedure today, you received medications for sedation.  These   medications may affect your judgment, balance and coordination.  Therefore,   for 24 hours, you have the following restrictions:   - DO NOT drive a car, operate machinery, make legal/financial decisions,   sign important papers or drink alcohol.    ACTIVITY:  Today: no heavy lifting, straining or running due to procedural   sedation/anesthesia.  The following day: return to full activity including work.  DIET:  Eat and drink normally unless instructed otherwise.     TREATMENT FOR COMMON SIDE EFFECTS:  - Mild abdominal pain, nausea, belching, bloating or excessive gas:  rest,   eat lightly and use a heating pad.  - Sore Throat: treat with throat lozenges and/or gargle with warm salt   water.  - Because air was used during the procedure, expelling large amounts of air   from your rectum or belching is normal.  - If a bowel prep was taken, you may not have a bowel movement for 1-3 days.    This is normal.  SYMPTOMS TO WATCH FOR AND REPORT TO YOUR PHYSICIAN:  1. Abdominal pain or bloating, other than gas cramps.  2. Chest pain.  3. Back pain.  4. Signs of infection such as: chills or fever occurring within 24 hours   after the procedure.  5. Rectal bleeding, which would show as bright red, maroon, or black stools.   (A tablespoon of blood from the rectum is not serious, especially if    hemorrhoids are present.)  6. Vomiting.  7. Weakness or dizziness.  GO DIRECTLY TO THE NEAREST EMERGENCY ROOM IF YOU HAVE ANY OF THE FOLLOWING:      Difficulty breathing              Chills and/or fever over 101 F   Persistent vomiting and/or vomiting blood   Severe abdominal pain   Severe chest pain   Black, tarry stools   Bleeding- more than one tablespoon   Any other symptom or condition that you feel may need urgent attention  Your doctor recommends these additional instructions:  If any biopsies were taken, your doctors clinic will contact you in 1 to 2   weeks with any results.  - [facility] to [facility] for [facility].   - Resume previous diet.   - Continue present medications.   - Perform outpatient colonoscopy at appointment to be scheduled pending   patient's clinical course and willingness to prep.  For questions, problems or results please call your physician - Jose Meyer MD at Work:  [phone].  [facility] [place] [facility], EMERGENCY ROOM PHONE NUMBER: [phone]  IF A COMPLICATION OR EMERGENCY SITUATION ARISES AND YOU ARE UNABLE TO REACH   YOUR PHYSICIAN - GO DIRECTLY TO THE EMERGENCY ROOM.  Jose Meyer MD  3/31/2025 11:00:30 AM  This report has been verified and signed electronically.  Dear patient,  As a result of recent federal legislation (The Federal Cures Act), you may   receive lab or pathology results from your procedure in your [facility]   account before your physician is able to contact you. Your physician or   their representative will relay the results to you with their   recommendations at their soonest availability.  Thank you,  PROVATION

## 2025-03-31 NOTE — TREATMENT PLAN
GI Treatment Plan    S/p EGD today    Impression:            - Mild Schatzki ring.                          - Normal stomach.                          - Normal examined duodenum.                          - No specimens collected.   Recommendation:        - Return patient to [facility] for ongoing care.                          - Resume previous diet.                          - Continue present medications.                          - Perform outpatient colonoscopy at appointment to                          be scheduled pending patient's clinical course and                          willingness to prep.     Gi will sign off    Radames Vasquez MD  Gastroenterology Fellow, PGY-IV

## 2025-03-31 NOTE — TRANSFER OF CARE
"Anesthesia Transfer of Care Note    Patient: Zelalem Gamez    Procedure(s) Performed: Procedure(s) (LRB):  EGD (ESOPHAGOGASTRODUODENOSCOPY) (N/A)    Patient location: PACU    Anesthesia Type: general    Transport from OR: Transported from OR on 2-3 L/min O2 by NC with adequate spontaneous ventilation    Post pain: adequate analgesia    Post assessment: no apparent anesthetic complications and tolerated procedure well    Post vital signs: stable    Level of consciousness: sedated and responds to stimulation    Nausea/Vomiting: no nausea/vomiting    Complications: none    Transfer of care protocol was followedComments: Bedside report to PACU RN, opportunity for questions given.       Last vitals: Visit Vitals  BP (!) 124/58   Pulse (!) 52   Temp 36.7 °C (98.1 °F)   Resp 16   Ht 5' 6" (1.676 m)   Wt 45 kg (99 lb 3.3 oz)   SpO2 (!) 93%   BMI 16.01 kg/m²     "

## 2025-03-31 NOTE — PT/OT/SLP PROGRESS
Occupational Therapy   Treatment    Name: Zelalem Gamez  MRN: [id number]  Admitting Diagnosis:  Symptomatic anemia  * Day of Surgery *    Recommendations:     Discharge Recommendations: Moderate Intensity Therapy  Discharge Equipment Recommendations:  lift device, hospital bed  Barriers to discharge:   (increased assistance)    Assessment:     Zelalem Gamez is a 74 y.o. male with a medical diagnosis of Symptomatic anemia.  He presents with the following performance deficits affecting function: weakness, gait instability, impaired balance, impaired self care skills, impaired functional mobility, impaired cognition, decreased coordination, decreased safety awareness, decreased lower extremity function, decreased upper extremity function, pain, abnormal tone, decreased ROM, impaired joint extensibility.     Rehab Prognosis:  Good; patient would benefit from acute skilled OT services to address these deficits and reach maximum level of function.       Plan:     Patient to be seen 4 x/week to address the above listed problems via self-care/home management, therapeutic activities, therapeutic exercises  Plan of Care Expires: 04/28/25  Plan of Care Reviewed with: patient    Subjective     Chief Complaint: None stated  Patient/Family Comments/goals: return home  Pain/Comfort:  Location - Side 1: Left  Location - Orientation 1: generalized  Location 1: heel  Pain Addressed 1: Reposition, Distraction    Objective:     Communicated with: David prior to session.  Patient found HOB elevated with telemetry, oxygen upon OT entry to room.    General Precautions: Standard, fall    Orthopedic Precautions:RUE non weight bearing  Braces: N/A  Respiratory Status: Nasal cannula, flow 2 L/min     Occupational Performance:     Bed Mobility:    Pt HOB elevated for long sitting     Functional Mobility/Transfers:  Functional Mobility: Pt declined and wanted to remain in bed    Activities of Daily Living:  Feeding:  supervision eating lunch  and drinking water  Grooming: supervision and setup A oral care, facial care, and cleaning dentures at bedside    Therex:  Pt performed the following exercises using AROM for 5 reps   Fwd shoulder raise   Bicep curl      AMPAC 6 Click ADL: 17    Treatment & Education:  Pt educated on role and purpose of therapy  Pt educated on goal setting  Pt educated on benefits of OOB activity  Pt educated on self advocacy     Patient left HOB elevated with all lines intact, call button in reach, and nsg notified    GOALS:   Multidisciplinary Problems       Occupational Therapy Goals          Problem: Occupational Therapy    Goal Priority Disciplines Outcome Interventions   Occupational Therapy Goal     OT, PT/OT Progressing    Description: Goals to be met by: 04/28/2025     Patient will increase functional independence with ADLs by performing:    UE Dressing with Supervision.  LE Dressing with Stand-by Assistance.  Grooming while seated with Set-up Assistance.  Toileting from bedside commode with Contact Guard Assistance for hygiene and clothing management.   Supine to sit with [place].  Stand pivot transfer with Contact Guard Assistance  Toilet transfer to bedside commode with Contact Guard Assistance.                         DME Justifications:  Patient requires a hospital bed for positioning of the body in ways that are not feasible with an ordinary bed. The patient requires special positioning for pain relief, limited mobility, and/or being unable to independently make changes in body position without the use of a hospital bed. Pillows and wedges will not be adequate for resolving these positional issues.      Time Tracking:     OT Date of Treatment: 03/31/25  OT Start Time: 1330  OT Stop Time: 1353  OT Total Time (min): 23 min    Billable Minutes:Self Care/Home Management 13  Therapeutic Exercise 10    OT/LUCRECIA: OT          3/31/2025

## 2025-03-31 NOTE — PROGRESS NOTES
[facility] Medicine  Progress Note    Patient Name: Zelalem Gamez  MRN: [id number]  Patient Class: IP- Inpatient   Admission Date: 3/27/2025  Length of Stay: 3 days  Attending Physician: Daisy Sandoval MD  Primary Care Provider: Deysi Pink MD        Subjective     Principal Problem:Symptomatic anemia        HPI:  Mr. Zelalem Gamez arpit 74 year old male with a pMHx of FrEF (EF 35%), hypertension, peripheral arterial disease, chronic kidney disease on hemodialysis (MWF), seizure disorder who presentsafter routine labs revealed hemoglobin of 6.6. He is currently residing at a SNF after being discharged from the hospital on 03/07. Patient reports he has been having persistent constipation for several weeks. He has not noticed any blood in his stools, however, he reports that a nurse changes his briefs for him. He has been able to get out of bed with the help of PT but is mostly confined to his bed due to generalized weakness that has been ongoing since he was discharged from the hospital. He also endorses nasal congestion and cough with sputum production for several weeks. He is on 1-2L of O2 at baseline. Patient was admitted from 2/21-03/07 after a syncopal event. He was found to have several issues going on, including acute blood loss anemia requiring 2 units PRBCs. At this time, he also had an ANGEL on CKD along with rhabdomyolysis, so a tunneled catheter was placed and he was started on dialysis. He has been going to dialysis regularly. He denies headache, fever, chills, hemoptysis, decreased appetite, chest pain, abdominal pain, nausea, vomiting, dysuria, hematuria, or lower extremity pain/swelling.      In ED, Pt AFVSS on 1L NC. Hgb 6.6>6.8. No leukocytosis. K 3.4. Cr 3, previously 2.8. Trop 19. CXR: Findings suggestive of pulmonary edema with bilateral pleural effusions, left greater than right. Admitted to  for c/f GIB.     Overview/Hospital Course:  Pt admitted for evaluation  of symptomatic anemia from clinic w/ Hgb 6.6. Pt afebrile and HDS. Started on GIB pathway and transfused 1 unit PRBC. IV protonix. GI consulted and tentatively plan for scope 3/31. CTA c/f stercoral proctitis w/ large stool burden. Discussed w/ CRS, patient stable, will evacuate stool and continue to monitor. Cr at baseline.  Also w/ b/l pleural effusions was diuresed following transfusion.    Interval History: Pt seen and evaluated at bedside this am.  Pt completed bowel prep last night and is going for scope w/ GI today. Hypoxic overnight, CXR w/ persistent volume overload. Will resume diuresis following scope today.     Review of Systems   Constitutional:  Negative for activity change, chills, diaphoresis, fatigue and fever.   HENT:  Negative for congestion, rhinorrhea and sore throat.    Respiratory:  Positive for cough (chronic, productive). Negative for chest tightness, shortness of breath and wheezing.    Cardiovascular:  Negative for chest pain, palpitations and leg swelling.   Gastrointestinal:  Negative for abdominal distention, abdominal pain, blood in stool, constipation, diarrhea, nausea and vomiting.   Genitourinary:  Negative for difficulty urinating, dysuria, frequency, hematuria and urgency.   Musculoskeletal:  Negative for arthralgias, back pain, gait problem and neck stiffness.   Neurological:  Negative for dizziness, tremors, seizures, syncope, weakness, light-headedness, numbness and headaches.   Psychiatric/Behavioral:  Negative for agitation, confusion and hallucinations.      Objective:     Vital Signs (Most Recent):  Temp: 96.8 °F (36 °C) (03/31/25 1056)  Pulse: (!) 52 (03/31/25 1056)  Resp: 18 (03/31/25 1056)  BP: (!) 124/58 (03/31/25 1055)  SpO2: (!) 93 % (03/31/25 1056) Vital Signs (24h Range):  Temp:  [96.8 °F (36 °C)-98.5 °F (36.9 °C)] 96.8 °F (36 °C)  Pulse:  [51-60] 52  Resp:  [16-18] 18  SpO2:  [87 %-95 %] 93 %  BP: (107-165)/(37-69) 124/58     Weight: 45 kg (99 lb 3.3 oz)  Body mass  index is 16.01 kg/m².    Intake/Output Summary (Last 24 hours) at 3/31/2025 1104  Last data filed at 3/31/2025 1049  Gross per 24 hour   Intake 630 ml   Output 825 ml   Net -195 ml         Physical Exam  Vitals and nursing note reviewed.   Constitutional:       General: He is not in acute distress.     Appearance: He is cachectic. He is not diaphoretic.   HENT:      Head: Normocephalic and atraumatic.      Right Ear: External ear normal.      Left Ear: External ear normal.      Nose: Nose normal. No congestion.      Mouth/Throat:      Pharynx: Oropharynx is clear.   Eyes:      General: No scleral icterus.     Extraocular Movements: Extraocular movements intact.   Cardiovascular:      Rate and Rhythm: Normal rate and regular rhythm.      Pulses: Normal pulses.      Heart sounds: Normal heart sounds. No murmur heard.  Pulmonary:      Effort: Pulmonary effort is normal. No respiratory distress.      Breath sounds: Rales present. No wheezing.   Abdominal:      General: Bowel sounds are normal. There is no distension.      Palpations: Abdomen is soft.      Tenderness: There is no abdominal tenderness. There is no guarding or rebound.   Musculoskeletal:      Cervical back: Normal range of motion.      Right lower leg: No edema.      Left lower leg: No edema.   Skin:     General: Skin is warm and dry.      Capillary Refill: Capillary refill takes less than 2 seconds.   Neurological:      General: No focal deficit present.      Mental Status: He is alert and oriented to person, place, and time. Mental status is at baseline.   Psychiatric:         Mood and Affect: Mood normal.         Behavior: Behavior normal.         Thought Content: Thought content normal.               Significant Labs: All pertinent labs within the past 24 hours have been reviewed.    Significant Imaging: I have reviewed all pertinent imaging results/findings within the past 24 hours.      Assessment & Plan  Symptomatic anemia  Anemia is likely due to   unknown suspect GI . Most recent hemoglobin and hematocrit are listed below.  Recent Labs     03/29/25  0855 03/30/25  0257 03/31/25  0228   HGB 9.5* 9.6* 8.6*   HCT 29.5* 29.8* 27.2*     Plan  - Monitor serial CBC: Every 8 hours  - Transfuse PRBC if patient becomes hemodynamically unstable, symptomatic or H/H drops below 7/21.  - Patient has received 1 units of PRBCs on 3/27  - Patient's anemia is currently stable  - CLD, NPO at [place]n  - IV Protonix 40mg BID  - IVF hydration   - 2 large bore IV access  - anti-emetics as needed  - GI consulted, appreciate recs   - Plan for EGD and colonoscopy today                                                                          Pleural effusion  Acute on chronic respiratory failure with hypoxia  Patient found to have small pleural effusion on imaging. I have personally reviewed and interpreted the following imaging: Xray. A thoracentesis was deferred. Most likely etiology includes Congestive Heart Failure. Management to include Diuresis    Patient with Hypoxic Respiratory failure which is Acute on chronic.  he is on home oxygen at 1 LPM. Supplemental oxygen was provided and noted- Oxygen Concentration (%):  [3] 3 Contributing diagnoses includes - CHF and Pleural effusion Labs and images were reviewed. Patient Has not had a recent ABG. Will treat underlying causes and adjust management of respiratory failure as follows    - lasix 40 mg IV qd to resume following scope  - echo Mild global hypokinesis present. There is mildly reduced systolic function. Ejection fraction is approximately 45%. There is indeterminate diastolic function.   Seizure disorder  - continue phenobarbital and zonisamide  Stage 3b chronic kidney disease  Creatine stable for now. BMP reviewed- noted Estimated Creatinine Clearance: 11.6 mL/min (A) (based on SCr of 3.5 mg/dL (H)). according to latest data. Based on current GFR, CKD stage is stage 3 - GFR 30-59.  Monitor UOP and serial BMP and adjust therapy as  needed. Renally dose meds. Avoid nephrotoxic medications and procedures.    ANGEL is likely due to pre-renal azotemia due to dehydration. Baseline creatinine is  2.8 . Most recent creatinine and eGFR are listed below.  Recent Labs     03/29/25  0519 03/30/25  0257 03/31/25  0759   CREATININE 3.4* 3.5* 3.5*   EGFRNORACEVR 18* 17* 17*      Plan  - ANGEL is worsening. Will adjust treatment as follows: 500 ml NS bolus  - Avoid nephrotoxins and renally dose meds for GFR listed above  - Monitor urine output, serial BMP, and adjust therapy as needed  Generalized weakness  - PTOT    Atrial fibrillation  Patient has paroxysmal (<7 days) atrial fibrillation. Patient is currently in sinus rhythm. [id number] Score: 2. The patients heart rate in the last 24 hours is as follows:  Pulse  Min: 51  Max: 60     Antiarrhythmics  , Daily, Oral  , Daily, Oral  amiodarone tablet 200 mg, Daily, Oral  metoprolol succinate (TOPROL-XL) 24 hr tablet 25 mg, Daily, Oral    Anticoagulants       Plan  - Replete lytes with a goal of K>4, Mg >2  - Patient is not anticoagulated due to risk of bleeding  - Patient's afib is currently controlled    Stercoral colitis  Constipation  - bowel regimen for stool evacuation and continue monitoring    Tobacco dependency  Dangers of cigarette smoking were reviewed with patient in detail. Patient was Counseled for 3-10 minutes. Nicotine replacement options were discussed. Nicotine replacement was discussed- not prescribed per patient's request  VTE Risk Mitigation (From admission, onward)           Ordered     Place sequential compression device  Until discontinued         03/27/25 1704     IP VTE HIGH RISK PATIENT  Once         03/27/25 1704     Place sequential compression device  Until discontinued         03/27/25 1704     Reason for no Mechanical VTE Prophylaxis  Once        Question:  Reasons:  Answer:  Risk of Bleeding    03/27/25 1704                    Discharge Planning   МАРИНА: 4/1/2025     Code Status:  Full Code   Medical Readiness for Discharge Date:   Discharge Plan A: Skilled Nursing Facility                Please place Justification for DME        Nestor Snow PA-C  Department of Hospital Medicine   [address] - End[facility]

## 2025-03-31 NOTE — ASSESSMENT & PLAN NOTE
Patient has paroxysmal (<7 days) atrial fibrillation. Patient is currently in sinus rhythm. [id number] Score: 2. The patients heart rate in the last 24 hours is as follows:  Pulse  Min: 51  Max: 60     Antiarrhythmics  , Daily, Oral  , Daily, Oral  amiodarone tablet 200 mg, Daily, Oral  metoprolol succinate (TOPROL-XL) 24 hr tablet 25 mg, Daily, Oral    Anticoagulants       Plan  - Replete lytes with a goal of K>4, Mg >2  - Patient is not anticoagulated due to risk of bleeding  - Patient's afib is currently controlled

## 2025-04-01 LAB
ABSOLUTE EOSINOPHIL (OHS): 0.06 K/UL
ABSOLUTE MONOCYTE (OHS): 0.55 K/UL (ref 0.3–1)
ABSOLUTE NEUTROPHIL COUNT (OHS): 8.75 K/UL (ref 1.8–7.7)
ANION GAP (OHS): 11 MMOL/L (ref 8–16)
BASOPHILS # BLD AUTO: 0.04 K/UL
BASOPHILS NFR BLD AUTO: 0.4 %
BUN SERPL-MCNC: 37 MG/DL (ref 8–23)
CALCIUM SERPL-MCNC: 7.8 MG/DL (ref 8.7–10.5)
CHLORIDE SERPL-SCNC: 104 MMOL/L (ref 95–110)
CO2 SERPL-SCNC: 22 MMOL/L (ref 23–29)
CREAT SERPL-MCNC: 3.7 MG/DL (ref 0.5–1.4)
ERYTHROCYTE [DISTWIDTH] IN BLOOD BY AUTOMATED COUNT: 21.7 % (ref 11.5–14.5)
GFR SERPLBLD CREATININE-BSD FMLA CKD-EPI: 16 ML/MIN/1.73/M2
GLUCOSE SERPL-MCNC: 80 MG/DL (ref 70–110)
HCT VFR BLD AUTO: 29.5 % (ref 40–54)
HGB BLD-MCNC: 9.5 GM/DL (ref 14–18)
IMM GRANULOCYTES # BLD AUTO: 0.11 K/UL (ref 0–0.04)
IMM GRANULOCYTES NFR BLD AUTO: 1.1 % (ref 0–0.5)
LYMPHOCYTES # BLD AUTO: 0.86 K/UL (ref 1–4.8)
MAGNESIUM SERPL-MCNC: 2.7 MG/DL (ref 1.6–2.6)
MCH RBC QN AUTO: 31 PG (ref 27–31)
MCHC RBC AUTO-ENTMCNC: 32.2 G/DL (ref 32–36)
MCV RBC AUTO: 96 FL (ref 82–98)
NUCLEATED RBC (/100WBC) (OHS): 0 /100 WBC
PLATELET # BLD AUTO: 312 K/UL (ref 150–450)
PMV BLD AUTO: 10.6 FL (ref 9.2–12.9)
POTASSIUM SERPL-SCNC: 4.5 MMOL/L (ref 3.5–5.1)
RBC # BLD AUTO: 3.06 M/UL (ref 4.6–6.2)
RELATIVE EOSINOPHIL (OHS): 0.6 %
RELATIVE LYMPHOCYTE (OHS): 8.3 % (ref 18–48)
RELATIVE MONOCYTE (OHS): 5.3 % (ref 4–15)
RELATIVE NEUTROPHIL (OHS): 84.3 % (ref 38–73)
SODIUM SERPL-SCNC: 137 MMOL/L (ref 136–145)
WBC # BLD AUTO: 10.37 K/UL (ref 3.9–12.7)

## 2025-04-01 PROCEDURE — 63600175 PHARM REV CODE 636 W HCPCS: Performed by: PHYSICIAN ASSISTANT

## 2025-04-01 PROCEDURE — 25000003 PHARM REV CODE 250: Performed by: PHYSICIAN ASSISTANT

## 2025-04-01 PROCEDURE — 85025 COMPLETE CBC W/AUTO DIFF WBC: CPT | Performed by: PHYSICIAN ASSISTANT

## 2025-04-01 PROCEDURE — 82435 ASSAY OF BLOOD CHLORIDE: CPT | Performed by: PHYSICIAN ASSISTANT

## 2025-04-01 PROCEDURE — 25000003 PHARM REV CODE 250: Performed by: STUDENT IN AN ORGANIZED HEALTH CARE EDUCATION/TRAINING PROGRAM

## 2025-04-01 PROCEDURE — 83735 ASSAY OF MAGNESIUM: CPT | Performed by: PHYSICIAN ASSISTANT

## 2025-04-01 PROCEDURE — 36415 COLL VENOUS BLD VENIPUNCTURE: CPT | Performed by: PHYSICIAN ASSISTANT

## 2025-04-01 PROCEDURE — 21400001 HC TELEMETRY ROOM

## 2025-04-01 RX ORDER — PANTOPRAZOLE SODIUM 40 MG/1
40 TABLET, DELAYED RELEASE ORAL
Status: DISCONTINUED | OUTPATIENT
Start: 2025-04-01 | End: 2025-04-04 | Stop reason: HOSPADM

## 2025-04-01 RX ADMIN — METOPROLOL SUCCINATE 25 MG: 25 TABLET, EXTENDED RELEASE ORAL at 09:04

## 2025-04-01 RX ADMIN — AMIODARONE HYDROCHLORIDE 200 MG: 200 TABLET ORAL at 09:04

## 2025-04-01 RX ADMIN — SEVELAMER CARBONATE 800 MG: 800 TABLET, FILM COATED ORAL at 05:04

## 2025-04-01 RX ADMIN — PHENOBARBITAL 32.4 MG: 32.4 TABLET ORAL at 09:04

## 2025-04-01 RX ADMIN — HYDRALAZINE HYDROCHLORIDE 50 MG: 50 TABLET ORAL at 01:04

## 2025-04-01 RX ADMIN — Medication 2000 UNITS: at 09:04

## 2025-04-01 RX ADMIN — SEVELAMER CARBONATE 800 MG: 800 TABLET, FILM COATED ORAL at 12:04

## 2025-04-01 RX ADMIN — HYDRALAZINE HYDROCHLORIDE 50 MG: 50 TABLET ORAL at 10:04

## 2025-04-01 RX ADMIN — POLYETHYLENE GLYCOL 3350 17 G: 17 POWDER, FOR SOLUTION ORAL at 09:04

## 2025-04-01 RX ADMIN — ARIPIPRAZOLE 2 MG: 2 TABLET ORAL at 09:04

## 2025-04-01 RX ADMIN — SULFAMETHOXAZOLE AND TRIMETHOPRIM 1 TABLET: 400; 80 TABLET ORAL at 09:04

## 2025-04-01 RX ADMIN — PANTOPRAZOLE SODIUM 40 MG: 40 TABLET, DELAYED RELEASE ORAL at 03:04

## 2025-04-01 RX ADMIN — AMLODIPINE BESYLATE 10 MG: 10 TABLET ORAL at 09:04

## 2025-04-01 RX ADMIN — SENNOSIDES 8.6 MG: 8.6 TABLET, FILM COATED ORAL at 09:04

## 2025-04-01 RX ADMIN — ACETAMINOPHEN 650 MG: 325 TABLET ORAL at 06:04

## 2025-04-01 RX ADMIN — FERROUS SULFATE TAB 325 MG (65 MG ELEMENTAL FE) 1 EACH: 325 (65 FE) TAB at 09:04

## 2025-04-01 RX ADMIN — PANTOPRAZOLE SODIUM 40 MG: 40 TABLET, DELAYED RELEASE ORAL at 09:04

## 2025-04-01 RX ADMIN — ZONISAMIDE 100 MG: 100 CAPSULE ORAL at 09:04

## 2025-04-01 RX ADMIN — Medication 250 MG: at 09:04

## 2025-04-01 RX ADMIN — HYDRALAZINE HYDROCHLORIDE 50 MG: 50 TABLET ORAL at 06:04

## 2025-04-01 RX ADMIN — SEVELAMER CARBONATE 800 MG: 800 TABLET, FILM COATED ORAL at 09:04

## 2025-04-01 RX ADMIN — FUROSEMIDE 40 MG: 10 INJECTION, SOLUTION INTRAVENOUS at 10:04

## 2025-04-01 NOTE — ASSESSMENT & PLAN NOTE
Patient has paroxysmal (<7 days) atrial fibrillation. Patient is currently in sinus rhythm. [id number] Score: 2. The patients heart rate in the last 24 hours is as follows:  Pulse  Min: 57  Max: 66     Antiarrhythmics  , Daily, Oral  , Daily, Oral  amiodarone tablet 200 mg, Daily, Oral  metoprolol succinate (TOPROL-XL) 24 hr tablet 25 mg, Daily, Oral    Anticoagulants       Plan  - Replete lytes with a goal of K>4, Mg >2  - Patient is not anticoagulated due to risk of bleeding  - Patient's afib is currently controlled

## 2025-04-01 NOTE — ASSESSMENT & PLAN NOTE
Concern for cachexia as evidenced by BMI less than 20 in the presence of known chronic disease. Treatment to include nutrition consult.      Body mass index is 16.01 kg/m².  Albumin   Date Value Ref Range Status   03/27/2025 2.0 (L) 3.5 - 5.2 g/dL Final   03/07/2025 2.9 (L) 3.4 - 5.0 g/dL Final   10/08/2021 3.7 3.5 - 5.2 g/dL Final

## 2025-04-01 NOTE — PROGRESS NOTES
[facility] Medicine  Progress Note    Patient Name: Zelalem Gamez  MRN: [id number]  Patient Class: IP- Inpatient   Admission Date: 3/27/2025  Length of Stay: 4 days  Attending Physician: Daisy Sandoval MD  Primary Care Provider: Deysi Pink MD        Subjective     Principal Problem:Symptomatic anemia        HPI:  Mr. Zelalem Gamez arpit 74 year old male with a pMHx of FrEF (EF 35%), hypertension, peripheral arterial disease, chronic kidney disease on hemodialysis (MWF), seizure disorder who presentsafter routine labs revealed hemoglobin of 6.6. He is currently residing at a SNF after being discharged from the hospital on 03/07. Patient reports he has been having persistent constipation for several weeks. He has not noticed any blood in his stools, however, he reports that a nurse changes his briefs for him. He has been able to get out of bed with the help of PT but is mostly confined to his bed due to generalized weakness that has been ongoing since he was discharged from the hospital. He also endorses nasal congestion and cough with sputum production for several weeks. He is on 1-2L of O2 at baseline. Patient was admitted from 2/21-03/07 after a syncopal event. He was found to have several issues going on, including acute blood loss anemia requiring 2 units PRBCs. At this time, he also had an ANGEL on CKD along with rhabdomyolysis, so a tunneled catheter was placed and he was started on dialysis. He has been going to dialysis regularly. He denies headache, fever, chills, hemoptysis, decreased appetite, chest pain, abdominal pain, nausea, vomiting, dysuria, hematuria, or lower extremity pain/swelling.      In ED, Pt AFVSS on 1L NC. Hgb 6.6>6.8. No leukocytosis. K 3.4. Cr 3, previously 2.8. Trop 19. CXR: Findings suggestive of pulmonary edema with bilateral pleural effusions, left greater than right. Admitted to  for c/f GIB.     Overview/Hospital Course:  Pt  admitted for evaluation of symptomatic anemia from clinic w/ Hgb 6.6. Pt afebrile and HDS. Started on GIB pathway and transfused 1 unit PRBC. IV protonix. GI consulted and tentatively plan for scope 3/31. CTA c/f stercoral proctitis w/ large stool burden. Discussed w/ CRS, patient stable, will evacuate stool and continue to monitor. Cr at baseline.  Also w/ b/l pleural effusions was diuresed following transfusion.    Interval History: NAEON. Pt seen and evaluated at bedside this am.  Pt still has increased O2 requirement and requiring IV diuresis today. Will consider oral transition and discharge tomorrow, if euvolemic. Unable to complete 6mwt today. Discharge tentatively tomorrow.    Review of Systems   Constitutional:  Negative for activity change, chills, diaphoresis, fatigue and fever.   HENT:  Negative for congestion, rhinorrhea and sore throat.    Respiratory:  Positive for cough (chronic, productive). Negative for chest tightness, shortness of breath and wheezing.    Cardiovascular:  Negative for chest pain, palpitations and leg swelling.   Gastrointestinal:  Negative for abdominal distention, abdominal pain, blood in stool, constipation, diarrhea, nausea and vomiting.   Genitourinary:  Negative for difficulty urinating, dysuria, frequency, hematuria and urgency.   Musculoskeletal:  Negative for arthralgias, back pain, gait problem and neck stiffness.   Neurological:  Negative for dizziness, tremors, seizures, syncope, weakness, light-headedness, numbness and headaches.   Psychiatric/Behavioral:  Negative for agitation, confusion and hallucinations.      Objective:     Vital Signs (Most Recent):  Temp: 97.4 °F (36.3 °C) (04/01/25 1120)  Pulse: 61 (04/01/25 1120)  Resp: 18 (04/01/25 1120)  BP: 133/61 (04/01/25 1120)  SpO2: (!) 94 % (04/01/25 1120) Vital Signs (24h Range):  Temp:  [97.4 °F (36.3 °C)-98.5 °F (36.9 °C)] 97.4 °F (36.3 °C)  Pulse:  [57-66] 61  Resp:  [17-18] 18  SpO2:  [91 %-95 %] 94 %  BP:  (133-179)/(45-73) 133/61     Weight: 45 kg (99 lb 3.3 oz)  Body mass index is 16.01 kg/m².    Intake/Output Summary (Last 24 hours) at 4/1/2025 1450  Last data filed at 4/1/2025 1222  Gross per 24 hour   Intake 560 ml   Output 550 ml   Net 10 ml         Physical Exam  Vitals and nursing note reviewed.   Constitutional:       General: He is not in acute distress.     Appearance: He is cachectic. He is not diaphoretic.   HENT:      Head: Normocephalic and atraumatic.      Right Ear: External ear normal.      Left Ear: External ear normal.      Nose: Nose normal. No congestion.      Mouth/Throat:      Pharynx: Oropharynx is clear.   Eyes:      General: No scleral icterus.     Extraocular Movements: Extraocular movements intact.   Cardiovascular:      Rate and Rhythm: Normal rate and regular rhythm.      Pulses: Normal pulses.      Heart sounds: Normal heart sounds. No murmur heard.  Pulmonary:      Effort: Pulmonary effort is normal. No respiratory distress.      Breath sounds: Rales present. No wheezing.   Abdominal:      General: Bowel sounds are normal. There is no distension.      Palpations: Abdomen is soft.      Tenderness: There is no abdominal tenderness. There is no guarding or rebound.   Musculoskeletal:      Cervical back: Normal range of motion.      Right lower leg: No edema.      Left lower leg: No edema.   Skin:     General: Skin is warm and dry.      Capillary Refill: Capillary refill takes less than 2 seconds.   Neurological:      General: No focal deficit present.      Mental Status: He is alert and oriented to person, place, and time. Mental status is at baseline.   Psychiatric:         Mood and Affect: Mood normal.         Behavior: Behavior normal.         Thought Content: Thought content normal.               Significant Labs: All pertinent labs within the past 24 hours have been reviewed.    Significant Imaging: I have reviewed all pertinent imaging results/findings within the past 24  hours.      Assessment & Plan  Symptomatic anemia  Anemia is likely due to  unknown suspect GI . Most recent hemoglobin and hematocrit are listed below.  Recent Labs     03/30/25  0257 03/31/25  0228 04/01/25  0420   HGB 9.6* 8.6* 9.5*   HCT 29.8* 27.2* 29.5*     Plan  - Monitor serial CBC: Every 8 hours  - Transfuse PRBC if patient becomes hemodynamically unstable, symptomatic or H/H drops below 7/21.  - Patient has received 1 units of PRBCs on 3/27  - Patient's anemia is currently stable  - CLD, NPO at [place]  - IV Protonix 40mg BID  - IVF hydration   - 2 large bore IV access  - anti-emetics as needed  - GI consulted, appreciate recs   - S/p EGD today     Impression:    - Mild Schatzki ring.                          - Normal stomach.                          - Normal examined duodenum.                          - No specimens collected.   Recommendation:        - Return patient to [facility] for [facility] care.                          - Resume previous diet.                          - Continue present medications.                          - Perform outpatient colonoscopy at appointment to                          be scheduled pending patient's clinical course and                          willingness to prep.                                                                          Pleural effusion  Acute on chronic respiratory failure with hypoxia  Patient found to have small pleural effusion on imaging. I have personally reviewed and interpreted the following imaging: Xray. A thoracentesis was deferred. Most likely etiology includes Congestive Heart Failure. Management to include Diuresis    Patient with Hypoxic Respiratory failure which is Acute on chronic.  he is on home oxygen at 1 LPM. Supplemental oxygen was provided and noted-   Contributing diagnoses includes - CHF and Pleural effusion Labs and images were reviewed. Patient Has not had a recent ABG. Will treat underlying causes and adjust management of  respiratory failure as follows    - lasix 40 mg IV qd to resume following scope  - echo Mild global hypokinesis present. There is mildly reduced systolic function. Ejection fraction is approximately 45%. There is indeterminate diastolic function.   Seizure disorder  - continue phenobarbital and zonisamide  Stage 3b chronic kidney disease  Creatine stable for now. BMP reviewed- noted Estimated Creatinine Clearance: 11 mL/min (A) (based on SCr of 3.7 mg/dL (H)). according to latest data. Based on current GFR, CKD stage is stage 3 - GFR 30-59.  Monitor UOP and serial BMP and adjust therapy as needed. Renally dose meds. Avoid nephrotoxic medications and procedures.    ANGEL is likely due to pre-renal azotemia due to dehydration. Baseline creatinine is  2.8 . Most recent creatinine and eGFR are listed below.  Recent Labs     03/30/25  0257 03/31/25  0759 04/01/25  0420   CREATININE 3.5* 3.5* 3.7*   EGFRNORACEVR 17* 17* 16*      Plan  - ANGEL is worsening. Will adjust treatment as follows: 500 ml NS bolus  - Avoid nephrotoxins and renally dose meds for GFR listed above  - Monitor urine output, serial BMP, and adjust therapy as needed  Generalized weakness  - PTOT    Atrial fibrillation  Patient has paroxysmal (<7 days) atrial fibrillation. Patient is currently in sinus rhythm. [id number] Score: 2. The patients heart rate in the last 24 hours is as follows:  Pulse  Min: 57  Max: 66     Antiarrhythmics  , Daily, Oral  , Daily, Oral  amiodarone tablet 200 mg, Daily, Oral  metoprolol succinate (TOPROL-XL) 24 hr tablet 25 mg, Daily, Oral    Anticoagulants       Plan  - Replete lytes with a goal of K>4, Mg >2  - Patient is not anticoagulated due to risk of bleeding  - Patient's afib is currently controlled    Stercoral colitis  Constipation  - bowel regimen for stool evacuation and continue monitoring    Tobacco dependency  Dangers of cigarette smoking were reviewed with patient in detail. Patient was Counseled for 3-10 minutes.  Nicotine replacement options were discussed. Nicotine replacement was discussed- not prescribed per patient's request  Severe protein-calorie malnutrition  Nutrition consulted. Most recent weight and BMI monitored-     Measurements:  Wt Readings from Last 1 Encounters:   03/28/25 45 kg (99 lb 3.3 oz)   Body mass index is 16.01 kg/m².    Patient has been screened and assessed by RD.    Malnutrition Type:  Context: chronic illness  Level: severe    Malnutrition Characteristic Summary:  Energy Intake (Malnutrition): less than 75% for greater than or equal to 1 month  Subcutaneous Fat (Malnutrition): moderate depletion  Muscle Mass (Malnutrition): severe depletion    Interventions/Recommendations (treatment strategy):  1. Recommend liberalization to regular diet 2. RD following    Cachexia  Body mass index (BMI) less than 16.5  Concern for cachexia as evidenced by BMI less than 20 in the presence of known chronic disease. Treatment to include nutrition consult.      Body mass index is 16.01 kg/m².  Albumin   Date Value Ref Range Status   03/27/2025 2.0 (L) 3.5 - 5.2 g/dL Final   03/07/2025 2.9 (L) 3.4 - 5.0 g/dL Final   10/08/2021 3.7 3.5 - 5.2 g/dL Final     Age-related physical debility  Patient with Chronic debility due to age-related physical debility. The patient's latest AMPAC (Activity Measure for Post Acute Care) Score is listed below.    AM-PAC Score - How much help does the patient need for each activity listed  Basic Mobility Total Score: 10  Turning over in bed (including adjusting bedclothes, sheets and blankets)?: A lot  Sitting down on and standing up from a chair with arms (e.g., wheelchair, bedside commode, etc.): A lot  Moving from lying on back to sitting on the side of the bed?: A lot  Moving to and from a bed to a chair (including a wheelchair)?: A lot  Need to walk in hospital room?: Unable  Climbing 3-5 steps with a railing?: Unable    Plan  - PT/OT consulted  - Fall precautions in place    VTE  Risk Mitigation (From admission, onward)           Ordered     Place sequential compression device  Until discontinued         03/27/25 1704     IP VTE HIGH RISK PATIENT  Once         03/27/25 1704     Place sequential compression device  Until discontinued         03/27/25 1704     Reason for no Mechanical VTE Prophylaxis  Once        Question:  Reasons:  Answer:  Risk of Bleeding    03/27/25 1704                    Discharge Planning   МАРИНА: 4/2/2025     Code Status: Full Code   Medical Readiness for Discharge Date:   Discharge Plan A: Skilled Nursing Facility                Please place Justification for DME        Nestor Snow PA-C  Department of Hospital Medicine   [facility]

## 2025-04-01 NOTE — ASSESSMENT & PLAN NOTE
Creatine stable for now. BMP reviewed- noted Estimated Creatinine Clearance: 11 mL/min (A) (based on SCr of 3.7 mg/dL (H)). according to latest data. Based on current GFR, CKD stage is stage 3 - GFR 30-59.  Monitor UOP and serial BMP and adjust therapy as needed. Renally dose meds. Avoid nephrotoxic medications and procedures.    ANGEL is likely due to pre-renal azotemia due to dehydration. Baseline creatinine is 2.8. Most recent creatinine and eGFR are listed below.  Recent Labs     03/30/25  0257 03/31/25  0759 04/01/25  0420   CREATININE 3.5* 3.5* 3.7*   EGFRNORACEVR 17* 17* 16*      Plan  - ANGEL is worsening. Will adjust treatment as follows: 500 ml NS bolus  - Avoid nephrotoxins and renally dose meds for GFR listed above  - Monitor urine output, serial BMP, and adjust therapy as needed

## 2025-04-01 NOTE — ASSESSMENT & PLAN NOTE
Anemia is likely due to unknown suspect GI. Most recent hemoglobin and hematocrit are listed below.  Recent Labs     03/30/25  0257 03/31/25  0228 04/01/25  0420   HGB 9.6* 8.6* 9.5*   HCT 29.8* 27.2* 29.5*     Plan  - Monitor serial CBC: Every 8 hours  - Transfuse PRBC if patient becomes hemodynamically unstable, symptomatic or H/H drops below 7/21.  - Patient has received 1 units of PRBCs on 3/27  - Patient's anemia is currently stable  - CLD, NPO at [place]  - IV Protonix 40mg BID  - IVF hydration   - 2 large bore IV access  - anti-emetics as needed  - GI consulted, appreciate recs   - S/p EGD today     Impression:    - Mild Schatzki ring.                          - Normal stomach.                          - Normal examined duodenum.                          - No specimens collected.   Recommendation:        - Return patient to [facility] for [facility] care.                          - Resume previous diet.                          - Continue present medications.                          - Perform outpatient colonoscopy at appointment to                          be scheduled pending patient's clinical course and                          willingness to prep.

## 2025-04-01 NOTE — PLAN OF CARE
Problem: Adult Inpatient Plan of Care  Goal: Plan of Care Review  Outcome: Progressing  Goal: Patient-Specific Goal (Individualized)  Outcome: Progressing  Goal: Absence of Hospital-Acquired Illness or Injury  Outcome: Progressing  Goal: Optimal Comfort and Wellbeing  Outcome: Progressing  Goal: Readiness for Transition of Care  Outcome: Progressing     Problem: Gastrointestinal Bleeding  Goal: Optimal Coping with Acute Illness  Outcome: Progressing  Goal: Hemostasis  Outcome: Progressing     Problem: Infection  Goal: Absence of Infection Signs and Symptoms  Outcome: Progressing     Problem: Acute Kidney Injury/Impairment  Goal: Fluid and Electrolyte Balance  Outcome: Progressing  Goal: Improved Oral Intake  Outcome: Progressing  Goal: Effective Renal Function  Outcome: Progressing     Problem: Wound  Goal: Optimal Coping  Outcome: Progressing  Goal: Optimal Functional Ability  Outcome: Progressing  Goal: Absence of Infection Signs and Symptoms  Outcome: Progressing  Goal: Improved Oral Intake  Outcome: Progressing  Goal: Optimal Pain Control and Function  Outcome: Progressing  Goal: Skin Health and Integrity  Outcome: Progressing  Goal: Optimal Wound Healing  Outcome: Progressing     Problem: Skin Injury Risk Increased  Goal: Skin Health and Integrity  Outcome: Progressing     Problem: Fall Injury Risk  Goal: Absence of Fall and Fall-Related Injury  Outcome: Progressing

## 2025-04-01 NOTE — SUBJECTIVE & OBJECTIVE
Interval History: NAEON. Pt seen and evaluated at bedside this am.  Pt still has increased O2 requirement and requiring IV diuresis today. Will consider oral transition and discharge tomorrow, if euvolemic. Unable to complete 6mwt today. Discharge tentatively tomorrow.    Review of Systems   Constitutional:  Negative for activity change, chills, diaphoresis, fatigue and fever.   HENT:  Negative for congestion, rhinorrhea and sore throat.    Respiratory:  Positive for cough (chronic, productive). Negative for chest tightness, shortness of breath and wheezing.    Cardiovascular:  Negative for chest pain, palpitations and leg swelling.   Gastrointestinal:  Negative for abdominal distention, abdominal pain, blood in stool, constipation, diarrhea, nausea and vomiting.   Genitourinary:  Negative for difficulty urinating, dysuria, frequency, hematuria and urgency.   Musculoskeletal:  Negative for arthralgias, back pain, gait problem and neck stiffness.   Neurological:  Negative for dizziness, tremors, seizures, syncope, weakness, light-headedness, numbness and headaches.   Psychiatric/Behavioral:  Negative for agitation, confusion and hallucinations.      Objective:     Vital Signs (Most Recent):  Temp: 97.4 °F (36.3 °C) (04/01/25 1120)  Pulse: 61 (04/01/25 1120)  Resp: 18 (04/01/25 1120)  BP: 133/61 (04/01/25 1120)  SpO2: (!) 94 % (04/01/25 1120) Vital Signs (24h Range):  Temp:  [97.4 °F (36.3 °C)-98.5 °F (36.9 °C)] 97.4 °F (36.3 °C)  Pulse:  [57-66] 61  Resp:  [17-18] 18  SpO2:  [91 %-95 %] 94 %  BP: (133-179)/(45-73) 133/61     Weight: 45 kg (99 lb 3.3 oz)  Body mass index is 16.01 kg/m².    Intake/Output Summary (Last 24 hours) at 4/1/2025 1450  Last data filed at 4/1/2025 1222  Gross per 24 hour   Intake 560 ml   Output 550 ml   Net 10 ml         Physical Exam  Vitals and nursing note reviewed.   Constitutional:       General: He is not in acute distress.     Appearance: He is cachectic. He is not diaphoretic.   HENT:       Head: Normocephalic and atraumatic.      Right Ear: External ear normal.      Left Ear: External ear normal.      Nose: Nose normal. No congestion.      Mouth/Throat:      Pharynx: Oropharynx is clear.   Eyes:      General: No scleral icterus.     Extraocular Movements: Extraocular movements intact.   Cardiovascular:      Rate and Rhythm: Normal rate and regular rhythm.      Pulses: Normal pulses.      Heart sounds: Normal heart sounds. No murmur heard.  Pulmonary:      Effort: Pulmonary effort is normal. No respiratory distress.      Breath sounds: Rales present. No wheezing.   Abdominal:      General: Bowel sounds are normal. There is no distension.      Palpations: Abdomen is soft.      Tenderness: There is no abdominal tenderness. There is no guarding or rebound.   Musculoskeletal:      Cervical back: Normal range of motion.      Right lower leg: No edema.      Left lower leg: No edema.   Skin:     General: Skin is warm and dry.      Capillary Refill: Capillary refill takes less than 2 seconds.   Neurological:      General: No focal deficit present.      Mental Status: He is alert and oriented to person, place, and time. Mental status is at baseline.   Psychiatric:         Mood and Affect: Mood normal.         Behavior: Behavior normal.         Thought Content: Thought content normal.               Significant Labs: All pertinent labs within the past 24 hours have been reviewed.    Significant Imaging: I have reviewed all pertinent imaging results/findings within the past 24 hours.

## 2025-04-01 NOTE — ANESTHESIA POSTPROCEDURE EVALUATION
Anesthesia Post Evaluation    Patient: Zelalem Gamez    Procedure(s) Performed: Procedure(s) (LRB):  EGD (ESOPHAGOGASTRODUODENOSCOPY) (N/A)    Final Anesthesia Type: general      Patient location during evaluation: PACU  Patient participation: Yes- Able to Participate  Level of consciousness: awake and alert  Post-procedure vital signs: reviewed and stable  Pain management: adequate  Airway patency: patent    PONV status at discharge: No PONV  Anesthetic complications: no      Cardiovascular status: blood pressure returned to baseline  Respiratory status: unassisted  Hydration status: euvolemic  Follow-up not needed.              Vitals Value Taken Time   /61 04/01/25 11:20   Temp 36.3 °C (97.4 °F) 04/01/25 11:20   Pulse 61 04/01/25 11:20   Resp 18 04/01/25 11:20   SpO2 94 % 04/01/25 11:20         Event Time   Out of Recovery 11:23:00         Pain/Oli Score: Pain Rating Prior to Med Admin: 5 (4/1/2025  6:29 AM)  Oli Score: 9 (3/31/2025  9:00 PM)

## 2025-04-01 NOTE — CONSULTS
[facility] - [facility] Medicine Telemetry  Adult Nutrition  Consult Note    SUMMARY     Recommendations  1. Recommend liberalization to regular diet   2. Add Novasource Renal BID for optimization of caloric intake   3. RD following      Goals: Meet % of EEN/EPN by RD f/u date  Nutrition Goal Status: goal not met  Communication of RD Recs: POC    Nutrition Discharge Planning     Nutrition Discharge Planning: adequate nutritional intake    Assessment and Plan    Endocrine  Severe protein-calorie malnutrition  Malnutrition Type:  Context: chronic illness  Level: severe    Related to (etiology):   Decreased ability to consume sufficient energy     Signs and Symptoms (as evidenced by):   Malnutrition Characteristic Summary:  Energy Intake (Malnutrition): less than 75% for greater than or equal to 1 month  Subcutaneous Fat (Malnutrition): moderate depletion  Muscle Mass (Malnutrition): severe depletion    Interventions (treatment strategy):  1. Recommend liberalization to regular diet   2. Add Novasource Renal BID for optimization of caloric intake   3. RD following    Nutrition Diagnosis Status:   New          Malnutrition Assessment  Malnutrition Context: chronic illness  Malnutrition Level: severe          Energy Intake (Malnutrition): less than 75% for greater than or equal to 1 month  Subcutaneous Fat (Malnutrition): moderate depletion  Muscle Mass (Malnutrition): severe depletion   Upper Arm Region (Subcutaneous Fat Loss): moderate depletion   Religion Region (Muscle Loss): moderate depletion  Clavicle Bone Region (Muscle Loss): severe depletion  Dorsal Hand (Muscle Loss): severe depletion                 Reason for Assessment    Reason For Assessment: consult  Diagnosis: other (see comments) (symptomatic anemia)  General Information Comments: RD consulted for malnutrition assessment. Spoke w/ pt at bedside, reports disliking food preferences that are offered in the hospital.  Reports a poor appetite at this  "time. Unable to obtain much information/hx at this time as pt kept muttering apparent dislike for preferences in hospital. RD discussed supplements for optimization of protein/caloric intake which pt is willing to try. Dietary number provided. NFPE was able to be completed, 4/1, pt meets criteria for severe protein calorie malnutrition in the context of chronic illness per ASPEN guidelines. RD following. LBM noted-3/30  Nutrition Related Social Determinants of Health: SDOH: Unable to assess at this time.       Nutrition/Diet History    Spiritual, Cultural Beliefs, Anabaptist Practices, Values that Affect Care: no  Food Allergies: NKFA    Anthropometrics    Height: 5' 6" (167.6 cm)  Height (inches): 66 in  Height Method: Stated  Weight: 45 kg (99 lb 3.3 oz)  Weight (lb): 99.21 lb  Weight Method: Stated  Ideal Body Weight (IBW), Male: 142 lb  % Ideal Body Weight, Male (lb): 69.87 %  BMI (Calculated): 16  BMI Grade: less than 18.5 - underweight       Lab/Procedures/Meds    Pertinent Labs Reviewed: reviewed  Pertinent Labs Comments: GFR 16  Pertinent Medications Reviewed: reviewed  Pertinent Medications Comments: senna tablet    Estimated/Assessed Needs    Weight Used For Calorie Calculations: 45 kg (99 lb 3.3 oz)  Energy Calorie Requirements (kcal): 7140-3778  Energy Need Method: Kcal/kg (30-35 kcal/kg)  Protein Requirements: 54-67 g (1.2-1.5 g/kg)  Weight Used For Protein Calculations: 45 kg (99 lb 3.3 oz)        RDA Method (mL): 1350         Nutrition Prescription Ordered    Current Diet Order: Heart healthy diet    Evaluation of Received Nutrient/Fluid Intake    I/O: -  Energy Calories Required: not meeting needs  Protein Required: not meeting needs  Fluid Required: not meeting needs  Total Fluid Intake (mL/kg): 1 ml or fluid per MD  Tolerance: tolerating  % Intake of Estimated Energy Needs: 0 - 25 %  % Meal Intake: 0 - 25 %    Nutrition Risk    Level of Risk/Frequency of Follow-up: low ((1-2x/week))       Monitor " and Evaluation    Monitor and Evaluation: Food and beverage intake, Energy intake, Protein intake       Nutrition Follow-Up    RD Follow-up?: Yes

## 2025-04-01 NOTE — ASSESSMENT & PLAN NOTE
Nutrition consulted. Most recent weight and BMI monitored-     Measurements:  Wt Readings from Last 1 Encounters:   03/28/25 45 kg (99 lb 3.3 oz)   Body mass index is 16.01 kg/m².    Patient has been screened and assessed by RD.    Malnutrition Type:  Context: chronic illness  Level: severe    Malnutrition Characteristic Summary:  Energy Intake (Malnutrition): less than 75% for greater than or equal to 1 month  Subcutaneous Fat (Malnutrition): moderate depletion  Muscle Mass (Malnutrition): severe depletion    Interventions/Recommendations (treatment strategy):  1. Recommend liberalization to regular diet 2. RD following

## 2025-04-01 NOTE — PLAN OF CARE
Recommendations  1. Recommend liberalization to regular diet   2. Add Novasource Renal BID for optimization of caloric intake   3. RD following        Goals: Meet % of EEN/EPN by RD f/u date  Nutrition Goal Status: goal not met  Communication of RD Recs: POC

## 2025-04-01 NOTE — ASSESSMENT & PLAN NOTE
Malnutrition Type:  Context: chronic illness  Level: severe    Related to (etiology):   Decreased ability to consume sufficient energy     Signs and Symptoms (as evidenced by):   Malnutrition Characteristic Summary:  Energy Intake (Malnutrition): less than 75% for greater than or equal to 1 month  Subcutaneous Fat (Malnutrition): moderate depletion  Muscle Mass (Malnutrition): severe depletion    Interventions (treatment strategy):  1. Recommend liberalization to regular diet   2. Add Novasource Renal BID for optimization of caloric intake   3. RD following    Nutrition Diagnosis Status:   New

## 2025-04-01 NOTE — ASSESSMENT & PLAN NOTE
Patient found to have small pleural effusion on imaging. I have personally reviewed and interpreted the following imaging: Xray. A thoracentesis was deferred. Most likely etiology includes Congestive Heart Failure. Management to include Diuresis    Patient with Hypoxic Respiratory failure which is Acute on chronic.  he is on home oxygen at 1 LPM. Supplemental oxygen was provided and noted-   Contributing diagnoses includes - CHF and Pleural effusion Labs and images were reviewed. Patient Has not had a recent ABG. Will treat underlying causes and adjust management of respiratory failure as follows    - lasix 40 mg IV qd to resume following scope  - echo Mild global hypokinesis present. There is mildly reduced systolic function. Ejection fraction is approximately 45%. There is indeterminate diastolic function.

## 2025-04-02 LAB
ABSOLUTE EOSINOPHIL (OHS): 0.15 K/UL
ABSOLUTE MONOCYTE (OHS): 0.64 K/UL (ref 0.3–1)
ABSOLUTE NEUTROPHIL COUNT (OHS): 10.03 K/UL (ref 1.8–7.7)
ANION GAP (OHS): 11 MMOL/L (ref 8–16)
BASOPHILS # BLD AUTO: 0.05 K/UL
BASOPHILS NFR BLD AUTO: 0.4 %
BUN SERPL-MCNC: 38 MG/DL (ref 8–23)
CALCIUM SERPL-MCNC: 8.1 MG/DL (ref 8.7–10.5)
CHLORIDE SERPL-SCNC: 104 MMOL/L (ref 95–110)
CO2 SERPL-SCNC: 22 MMOL/L (ref 23–29)
CREAT SERPL-MCNC: 3.8 MG/DL (ref 0.5–1.4)
ERYTHROCYTE [DISTWIDTH] IN BLOOD BY AUTOMATED COUNT: 22 % (ref 11.5–14.5)
GFR SERPLBLD CREATININE-BSD FMLA CKD-EPI: 16 ML/MIN/1.73/M2
GLUCOSE SERPL-MCNC: 79 MG/DL (ref 70–110)
HCT VFR BLD AUTO: 30.3 % (ref 40–54)
HGB BLD-MCNC: 9.7 GM/DL (ref 14–18)
IMM GRANULOCYTES # BLD AUTO: 0.12 K/UL (ref 0–0.04)
IMM GRANULOCYTES NFR BLD AUTO: 1 % (ref 0–0.5)
LYMPHOCYTES # BLD AUTO: 0.78 K/UL (ref 1–4.8)
MAGNESIUM SERPL-MCNC: 2.6 MG/DL (ref 1.6–2.6)
MCH RBC QN AUTO: 31.3 PG (ref 27–31)
MCHC RBC AUTO-ENTMCNC: 32 G/DL (ref 32–36)
MCV RBC AUTO: 98 FL (ref 82–98)
NUCLEATED RBC (/100WBC) (OHS): 0 /100 WBC
PLATELET # BLD AUTO: 318 K/UL (ref 150–450)
PMV BLD AUTO: 10.6 FL (ref 9.2–12.9)
POCT GLUCOSE: 178 MG/DL (ref 70–110)
POTASSIUM SERPL-SCNC: 4.5 MMOL/L (ref 3.5–5.1)
RBC # BLD AUTO: 3.1 M/UL (ref 4.6–6.2)
RELATIVE EOSINOPHIL (OHS): 1.3 %
RELATIVE LYMPHOCYTE (OHS): 6.6 % (ref 18–48)
RELATIVE MONOCYTE (OHS): 5.4 % (ref 4–15)
RELATIVE NEUTROPHIL (OHS): 85.3 % (ref 38–73)
SODIUM SERPL-SCNC: 137 MMOL/L (ref 136–145)
WBC # BLD AUTO: 11.77 K/UL (ref 3.9–12.7)

## 2025-04-02 PROCEDURE — 97110 THERAPEUTIC EXERCISES: CPT

## 2025-04-02 PROCEDURE — 97535 SELF CARE MNGMENT TRAINING: CPT

## 2025-04-02 PROCEDURE — 85025 COMPLETE CBC W/AUTO DIFF WBC: CPT | Performed by: PHYSICIAN ASSISTANT

## 2025-04-02 PROCEDURE — 25000242 PHARM REV CODE 250 ALT 637 W/ HCPCS: Performed by: PHYSICIAN ASSISTANT

## 2025-04-02 PROCEDURE — 25000003 PHARM REV CODE 250: Performed by: PHYSICIAN ASSISTANT

## 2025-04-02 PROCEDURE — 94761 N-INVAS EAR/PLS OXIMETRY MLT: CPT

## 2025-04-02 PROCEDURE — 82310 ASSAY OF CALCIUM: CPT | Performed by: PHYSICIAN ASSISTANT

## 2025-04-02 PROCEDURE — 21400001 HC TELEMETRY ROOM

## 2025-04-02 PROCEDURE — 63600175 PHARM REV CODE 636 W HCPCS: Mod: JZ,TB | Performed by: STUDENT IN AN ORGANIZED HEALTH CARE EDUCATION/TRAINING PROGRAM

## 2025-04-02 PROCEDURE — 97112 NEUROMUSCULAR REEDUCATION: CPT

## 2025-04-02 PROCEDURE — 25000003 PHARM REV CODE 250: Performed by: STUDENT IN AN ORGANIZED HEALTH CARE EDUCATION/TRAINING PROGRAM

## 2025-04-02 PROCEDURE — 63600175 PHARM REV CODE 636 W HCPCS: Performed by: PHYSICIAN ASSISTANT

## 2025-04-02 PROCEDURE — 97530 THERAPEUTIC ACTIVITIES: CPT

## 2025-04-02 PROCEDURE — 27000221 HC OXYGEN, UP TO 24 HOURS

## 2025-04-02 PROCEDURE — 94640 AIRWAY INHALATION TREATMENT: CPT

## 2025-04-02 PROCEDURE — 83735 ASSAY OF MAGNESIUM: CPT | Performed by: PHYSICIAN ASSISTANT

## 2025-04-02 PROCEDURE — 99900035 HC TECH TIME PER 15 MIN (STAT)

## 2025-04-02 PROCEDURE — 36415 COLL VENOUS BLD VENIPUNCTURE: CPT | Performed by: PHYSICIAN ASSISTANT

## 2025-04-02 RX ORDER — ALBUTEROL SULFATE 2.5 MG/.5ML
2.5 SOLUTION RESPIRATORY (INHALATION) EVERY 4 HOURS PRN
Status: DISCONTINUED | OUTPATIENT
Start: 2025-04-02 | End: 2025-04-02

## 2025-04-02 RX ORDER — ALBUTEROL SULFATE 2.5 MG/.5ML
2.5 SOLUTION RESPIRATORY (INHALATION)
Status: DISCONTINUED | OUTPATIENT
Start: 2025-04-02 | End: 2025-04-04 | Stop reason: HOSPADM

## 2025-04-02 RX ORDER — PREDNISONE 20 MG/1
60 TABLET ORAL DAILY
Status: DISCONTINUED | OUTPATIENT
Start: 2025-04-03 | End: 2025-04-03

## 2025-04-02 RX ADMIN — AMIODARONE HYDROCHLORIDE 200 MG: 200 TABLET ORAL at 08:04

## 2025-04-02 RX ADMIN — SULFAMETHOXAZOLE AND TRIMETHOPRIM 1 TABLET: 400; 80 TABLET ORAL at 08:04

## 2025-04-02 RX ADMIN — ARIPIPRAZOLE 2 MG: 2 TABLET ORAL at 08:04

## 2025-04-02 RX ADMIN — SEVELAMER CARBONATE 800 MG: 800 TABLET, FILM COATED ORAL at 06:04

## 2025-04-02 RX ADMIN — TRAZODONE HYDROCHLORIDE 25 MG: 50 TABLET ORAL at 09:04

## 2025-04-02 RX ADMIN — PANTOPRAZOLE SODIUM 40 MG: 40 TABLET, DELAYED RELEASE ORAL at 06:04

## 2025-04-02 RX ADMIN — SENNOSIDES 8.6 MG: 8.6 TABLET, FILM COATED ORAL at 08:04

## 2025-04-02 RX ADMIN — METHYLPREDNISOLONE SODIUM SUCCINATE 20 MG: 40 INJECTION, POWDER, FOR SOLUTION INTRAMUSCULAR; INTRAVENOUS at 01:04

## 2025-04-02 RX ADMIN — ALBUTEROL SULFATE 2.5 MG: 2.5 SOLUTION RESPIRATORY (INHALATION) at 07:04

## 2025-04-02 RX ADMIN — SEVELAMER CARBONATE 800 MG: 800 TABLET, FILM COATED ORAL at 05:04

## 2025-04-02 RX ADMIN — FUROSEMIDE 40 MG: 10 INJECTION, SOLUTION INTRAVENOUS at 10:04

## 2025-04-02 RX ADMIN — ZONISAMIDE 100 MG: 100 CAPSULE ORAL at 08:04

## 2025-04-02 RX ADMIN — PANTOPRAZOLE SODIUM 40 MG: 40 TABLET, DELAYED RELEASE ORAL at 05:04

## 2025-04-02 RX ADMIN — SULFAMETHOXAZOLE AND TRIMETHOPRIM 1 TABLET: 400; 80 TABLET ORAL at 09:04

## 2025-04-02 RX ADMIN — Medication 250 MG: at 08:04

## 2025-04-02 RX ADMIN — SEVELAMER CARBONATE 800 MG: 800 TABLET, FILM COATED ORAL at 12:04

## 2025-04-02 RX ADMIN — PHENOBARBITAL 32.4 MG: 32.4 TABLET ORAL at 09:04

## 2025-04-02 RX ADMIN — HYDRALAZINE HYDROCHLORIDE 50 MG: 50 TABLET ORAL at 06:04

## 2025-04-02 RX ADMIN — FERROUS SULFATE TAB 325 MG (65 MG ELEMENTAL FE) 1 EACH: 325 (65 FE) TAB at 08:04

## 2025-04-02 RX ADMIN — METOPROLOL SUCCINATE 25 MG: 25 TABLET, EXTENDED RELEASE ORAL at 08:04

## 2025-04-02 RX ADMIN — HYDRALAZINE HYDROCHLORIDE 50 MG: 50 TABLET ORAL at 01:04

## 2025-04-02 RX ADMIN — Medication 2000 UNITS: at 08:04

## 2025-04-02 RX ADMIN — AMLODIPINE BESYLATE 10 MG: 10 TABLET ORAL at 08:04

## 2025-04-02 NOTE — PROGRESS NOTES
Sander Brandon - Internal Medicine [facility] Medicine  Progress Note    Patient Name: Zelalem Gamez  MRN: [id number]  Patient Class: IP- Inpatient   Admission Date: 3/27/2025  Length of Stay: 5 days  Attending Physician: Daisy Sandoval MD  Primary Care Provider: Deysi Pink MD        Subjective     Principal Problem:Symptomatic anemia        HPI:  Mr. Zelalem Gamez arpit 74 year old male with a pMHx of FrEF (EF 35%), hypertension, peripheral arterial disease, chronic kidney disease on hemodialysis (MWF), seizure disorder who presentsafter routine labs revealed hemoglobin of 6.6. He is currently residing at a SNF after being discharged from the hospital on 03/07. Patient reports he has been having persistent constipation for several weeks. He has not noticed any blood in his stools, however, he reports that a nurse changes his briefs for him. He has been able to get out of bed with the help of PT but is mostly confined to his bed due to generalized weakness that has been ongoing since he was discharged from the hospital. He also endorses nasal congestion and cough with sputum production for several weeks. He is on 1-2L of O2 at baseline. Patient was admitted from 2/21-03/07 after a syncopal event. He was found to have several issues going on, including acute blood loss anemia requiring 2 units PRBCs. At this time, he also had an ANGEL on CKD along with rhabdomyolysis, so a tunneled catheter was placed and he was started on dialysis. He has been going to dialysis regularly. He denies headache, fever, chills, hemoptysis, decreased appetite, chest pain, abdominal pain, nausea, vomiting, dysuria, hematuria, or lower extremity pain/swelling.      In ED, Pt AFVSS on 1L NC. Hgb 6.6>6.8. No leukocytosis. K 3.4. Cr 3, previously 2.8. Trop 19. CXR: Findings suggestive of pulmonary edema with bilateral pleural effusions, left greater than right. Admitted to  for c/f GIB.     Overview/Hospital Course:  Pt  admitted for evaluation of symptomatic anemia from clinic w/ Hgb 6.6. Pt afebrile and HDS. Started on GIB pathway and transfused 1 unit PRBC. IV protonix. GI consulted and tentatively plan for scope 3/31. CTA c/f stercoral proctitis w/ large stool burden. Discussed w/ CRS, patient stable, will evacuate stool and continue to monitor. Cr at baseline.  Also w/ b/l pleural effusions was diuresed following transfusion.    Interval History: Pt is doing well this am. He is requesting to be discharged home. Discussed w/ Pt hypoxic event w/ SpO2 71% yesterday evening and placed on 4L NC. Pt reports he was not SOB and and had no chest pain. Today he is satting at 92% on Ra. He is unable to complete 6mwt d/t wheelchair dependence. Will continue to monitor for hypoxia and address any new O2 requirement.     Review of Systems   Constitutional:  Negative for activity change, chills and fever.   Respiratory:  Positive for cough (chronic, productive). Negative for chest tightness, shortness of breath and wheezing.    Cardiovascular:  Negative for chest pain.   Gastrointestinal:  Negative for abdominal pain, blood in stool, constipation, diarrhea, nausea and vomiting.   Genitourinary:  Negative for difficulty urinating and dysuria.   Musculoskeletal:  Positive for gait problem. Negative for arthralgias and back pain.   Neurological:  Negative for dizziness, light-headedness and headaches.   Psychiatric/Behavioral:  Negative for agitation.      Objective:     Vital Signs (Most Recent):  Temp: 97.5 °F (36.4 °C) (04/02/25 0750)  Pulse: (!) 59 (04/02/25 0750)  Resp: 18 (04/02/25 0333)  BP: (!) 161/62 (04/02/25 0750)  SpO2: (!) 92 % (04/02/25 0750) Vital Signs (24h Range):  Temp:  [97.4 °F (36.3 °C)-98.4 °F (36.9 °C)] 97.5 °F (36.4 °C)  Pulse:  [53-64] 59  Resp:  [18] 18  SpO2:  [71 %-94 %] 92 %  BP: (129-175)/(57-69) 161/62     Weight: 45 kg (99 lb 3.3 oz)  Body mass index is 16.01 kg/m².    Intake/Output Summary (Last 24 hours) at  4/2/2025 1031  Last data filed at 4/2/2025 0839  Gross per 24 hour   Intake 560 ml   Output 900 ml   Net -340 ml         Physical Exam  Vitals and nursing note reviewed.   Constitutional:       General: He is not in acute distress.     Appearance: He is cachectic. He is not diaphoretic.   HENT:      Head: Normocephalic and atraumatic.      Right Ear: External ear normal.      Left Ear: External ear normal.      Nose: Nose normal. No congestion.      Mouth/Throat:      Pharynx: Oropharynx is clear.   Eyes:      General: No scleral icterus.     Extraocular Movements: Extraocular movements intact.   Cardiovascular:      Rate and Rhythm: Normal rate and regular rhythm.      Pulses: Normal pulses.      Heart sounds: Normal heart sounds. No murmur heard.  Pulmonary:      Effort: Pulmonary effort is normal. No respiratory distress.      Breath sounds: Decreased air movement present. No wheezing or rales.   Abdominal:      General: Bowel sounds are normal. There is no distension.      Palpations: Abdomen is soft.      Tenderness: There is no abdominal tenderness. There is no guarding or rebound.   Musculoskeletal:      Cervical back: Normal range of motion.      Right lower leg: No edema.      Left lower leg: No edema.   Skin:     General: Skin is warm and dry.      Capillary Refill: Capillary refill takes less than 2 seconds.   Neurological:      General: No focal deficit present.      Mental Status: He is alert and oriented to person, place, and time. Mental status is at baseline.   Psychiatric:         Mood and Affect: Mood normal.         Behavior: Behavior normal.         Thought Content: Thought content normal.               Significant Labs: All pertinent labs within the past 24 hours have been reviewed.    Significant Imaging: I have reviewed all pertinent imaging results/findings within the past 24 hours.      Assessment & Plan  Symptomatic anemia  Anemia is likely due to  unknown suspect GI . Most recent  hemoglobin and hematocrit are listed below.  Recent Labs     03/31/25  0228 04/01/25  0420 04/02/25  0417   HGB 8.6* 9.5* 9.7*   HCT 27.2* 29.5* 30.3*     Plan  - Monitor serial CBC: Every 8 hours  - Transfuse PRBC if patient becomes hemodynamically unstable, symptomatic or H/H drops below 7/21.  - Patient has received 1 units of PRBCs on 3/27  - Patient's anemia is currently stable  - CLD, NPO at mn  - IV Protonix 40mg BID  - IVF hydration   - 2 large bore IV access  - anti-emetics as needed  - GI consulted, appreciate recs   - S/p EGD today     Impression:    - Mild Schatzki ring.                          - Normal stomach.                          - Normal examined duodenum.                          - No specimens collected.   Recommendation:        - [facility] for [facility] care.                          - Resume previous diet.                          - Continue present medications.                          - Perform outpatient colonoscopy at appointment to                          be scheduled pending patient's clinical course and                          willingness to prep.                                                                          Pleural effusion  Acute on chronic respiratory failure with hypoxia  Patient found to have small pleural effusion on imaging. I have personally reviewed and interpreted the following imaging: Xray. A thoracentesis was deferred. Most likely etiology includes Congestive Heart Failure. Management to include Diuresis    Patient with Hypoxic Respiratory failure which is Acute on chronic.  he is on home oxygen at 1 LPM. Supplemental oxygen was provided and noted- Oxygen Concentration (%):  [4] 4 Contributing diagnoses includes - CHF and Pleural effusion Labs and images were reviewed. Patient Has not had a recent ABG. Will treat underlying causes and adjust management of respiratory failure as follows    - lasix 40 mg IV qd to resume following scope  -  echo Mild global hypokinesis present. There is mildly reduced systolic function. Ejection fraction is approximately 45%. There is indeterminate diastolic function.   Seizure disorder  - continue phenobarbital and zonisamide  Stage 3b chronic kidney disease  Creatine stable for now. BMP reviewed- noted Estimated Creatinine Clearance: 11 mL/min (A) (based on SCr of 3.7 mg/dL (H)). according to latest data. Based on current GFR, CKD stage is stage 3 - GFR 30-59.  Monitor UOP and serial BMP and adjust therapy as needed. Renally dose meds. Avoid nephrotoxic medications and procedures.    ANGEL is likely due to pre-renal azotemia due to dehydration. Baseline creatinine is  2.8 . Most recent creatinine and eGFR are listed below.  Recent Labs     03/31/25  0759 04/01/25  0420   CREATININE 3.5* 3.7*   EGFRNORACEVR 17* 16*      Plan  - ANGEL is worsening. Will adjust treatment as follows: 500 ml NS bolus  - Avoid nephrotoxins and renally dose meds for GFR listed above  - Monitor urine output, serial BMP, and adjust therapy as needed  Generalized weakness  - PTOT    Atrial fibrillation  Patient has paroxysmal (<7 days) atrial fibrillation. Patient is currently in sinus rhythm. [id number] Score: 2. The patients heart rate in the last 24 hours is as follows:  Pulse  Min: 53  Max: 64     Antiarrhythmics  , Daily, Oral  , Daily, Oral  amiodarone tablet 200 mg, Daily, Oral  metoprolol succinate (TOPROL-XL) 24 hr tablet 25 mg, Daily, Oral    Anticoagulants       Plan  - Replete lytes with a goal of K>4, Mg >2  - Patient is not anticoagulated due to risk of bleeding  - Patient's afib is currently controlled    Stercoral colitis  Constipation  - bowel regimen for stool evacuation and continue monitoring    Tobacco dependency  Dangers of cigarette smoking were reviewed with patient in detail. Patient was Counseled for 3-10 minutes. Nicotine replacement options were discussed. Nicotine replacement was discussed- not prescribed per patient's  request  Severe protein-calorie malnutrition  Nutrition consulted. Most recent weight and BMI monitored-     Measurements:  Wt Readings from Last 1 Encounters:   04/02/25 45 kg (99 lb 3.3 oz)   Body mass index is 16.01 kg/m².    Patient has been screened and assessed by RD.    Malnutrition Type:  Context: chronic illness  Level: severe    Malnutrition Characteristic Summary:  Energy Intake (Malnutrition): less than 75% for greater than or equal to 1 month  Subcutaneous Fat (Malnutrition): moderate depletion  Muscle Mass (Malnutrition): severe depletion    Interventions/Recommendations (treatment strategy):  1. Recommend liberalization to regular diet 2. RD following    Cachexia  Body mass index (BMI) less than 16.5  Concern for cachexia as evidenced by BMI less than 20 in the presence of known chronic disease. Treatment to include nutrition consult.      Body mass index is 16.01 kg/m².  Albumin   Date Value Ref Range Status   03/27/2025 2.0 (L) 3.5 - 5.2 g/dL Final   03/07/2025 2.9 (L) 3.4 - 5.0 g/dL Final   10/08/2021 3.7 3.5 - 5.2 g/dL Final     Age-related physical debility  Patient with Chronic debility due to age-related physical debility. The patient's latest AMPAC (Activity Measure for Post Acute Care) Score is listed below.    AM-PAC Score - How much help does the patient need for each activity listed  Basic Mobility Total Score: 10  Turning over in bed (including adjusting bedclothes, sheets and blankets)?: A lot  Sitting down on and standing up from a chair with arms (e.g., wheelchair, bedside commode, etc.): A lot  Moving from lying on back to sitting on the side of the bed?: A lot  Moving to and from a bed to a chair (including a wheelchair)?: A lot  Need to walk in hospital room?: Unable  Climbing 3-5 steps with a railing?: Unable    Plan  - PT/OT consulted  - Fall precautions in place    VTE Risk Mitigation (From admission, onward)           Ordered     Place sequential compression device  Until  discontinued         03/27/25 1704     IP VTE HIGH RISK PATIENT  Once         03/27/25 1704     Reason for no Mechanical VTE Prophylaxis  Once        Question:  Reasons:  Answer:  Risk of Bleeding    03/27/25 1704                    Discharge Planning   МАРИНА: 4/3/2025     Code Status: Full Code   Medical Readiness for Discharge Date:   Discharge Plan A: Skilled Nursing Facility                Please place Justification for DME        Nestor Snow PA-C  Department of Hospital Medicine   [facility] - Internal Medicine Telemetry

## 2025-04-02 NOTE — ASSESSMENT & PLAN NOTE
Nutrition consulted. Most recent weight and BMI monitored-     Measurements:  Wt Readings from Last 1 Encounters:   04/02/25 45 kg (99 lb 3.3 oz)   Body mass index is 16.01 kg/m².    Patient has been screened and assessed by RD.    Malnutrition Type:  Context: chronic illness  Level: severe    Malnutrition Characteristic Summary:  Energy Intake (Malnutrition): less than 75% for greater than or equal to 1 month  Subcutaneous Fat (Malnutrition): moderate depletion  Muscle Mass (Malnutrition): severe depletion    Interventions/Recommendations (treatment strategy):  1. Recommend liberalization to regular diet 2. RD following

## 2025-04-02 NOTE — ASSESSMENT & PLAN NOTE
Anemia is likely due to unknown suspect GI. Most recent hemoglobin and hematocrit are listed below.  Recent Labs     03/31/25  0228 04/01/25  0420 04/02/25  0417   HGB 8.6* 9.5* 9.7*   HCT 27.2* 29.5* 30.3*     Plan  - Monitor serial CBC: Every 8 hours  - Transfuse PRBC if patient becomes hemodynamically unstable, symptomatic or H/H drops below 7/21.  - Patient has received 1 units of PRBCs on 3/27  - Patient's anemia is currently stable  - CLD, NPO at mn  - IV Protonix 40mg BID  - IVF hydration   - 2 large bore IV access  - anti-emetics as needed  - GI consulted, appreciate recs   - S/p EGD today     Impression:    - Mild Schatzki ring.                          - Normal stomach.                          - Normal examined duodenum.                          - No specimens collected.   Recommendation:        - Return patient to [facility] for [facility] care.                          - Resume previous diet.                          - Continue present medications.                          - Perform outpatient colonoscopy at appointment to                          be scheduled pending patient's clinical course and                          willingness to prep.

## 2025-04-02 NOTE — SUBJECTIVE & OBJECTIVE
Interval History: Pt is doing well this am. He is requesting to be discharged home. Discussed w/ Pt hypoxic event w/ SpO2 71% yesterday evening and placed on 4L NC. Pt reports he was not SOB and and had no chest pain. Today he is satting at 92% on Ra. He is unable to complete 6mwt d/t wheelchair dependence. Will continue to monitor for hypoxia and address any new O2 requirement.     Review of Systems   Constitutional:  Negative for activity change, chills and fever.   Respiratory:  Positive for cough (chronic, productive). Negative for chest tightness, shortness of breath and wheezing.    Cardiovascular:  Negative for chest pain.   Gastrointestinal:  Negative for abdominal pain, blood in stool, constipation, diarrhea, nausea and vomiting.   Genitourinary:  Negative for difficulty urinating and dysuria.   Musculoskeletal:  Positive for gait problem. Negative for arthralgias and back pain.   Neurological:  Negative for dizziness, light-headedness and headaches.   Psychiatric/Behavioral:  Negative for agitation.      Objective:     Vital Signs (Most Recent):  Temp: 97.5 °F (36.4 °C) (04/02/25 0750)  Pulse: (!) 59 (04/02/25 0750)  Resp: 18 (04/02/25 0333)  BP: (!) 161/62 (04/02/25 0750)  SpO2: (!) 92 % (04/02/25 0750) Vital Signs (24h Range):  Temp:  [97.4 °F (36.3 °C)-98.4 °F (36.9 °C)] 97.5 °F (36.4 °C)  Pulse:  [53-64] 59  Resp:  [18] 18  SpO2:  [71 %-94 %] 92 %  BP: (129-175)/(57-69) 161/62     Weight: 45 kg (99 lb 3.3 oz)  Body mass index is 16.01 kg/m².    Intake/Output Summary (Last 24 hours) at 4/2/2025 1031  Last data filed at 4/2/2025 0839  Gross per 24 hour   Intake 560 ml   Output 900 ml   Net -340 ml         Physical Exam  Vitals and nursing note reviewed.   Constitutional:       General: He is not in acute distress.     Appearance: He is cachectic. He is not diaphoretic.   HENT:      Head: Normocephalic and atraumatic.      Right Ear: External ear normal.      Left Ear: External ear normal.      Nose: Nose  normal. No congestion.      Mouth/Throat:      Pharynx: Oropharynx is clear.   Eyes:      General: No scleral icterus.     Extraocular Movements: Extraocular movements intact.   Cardiovascular:      Rate and Rhythm: Normal rate and regular rhythm.      Pulses: Normal pulses.      Heart sounds: Normal heart sounds. No murmur heard.  Pulmonary:      Effort: Pulmonary effort is normal. No respiratory distress.      Breath sounds: Decreased air movement present. No wheezing or rales.   Abdominal:      General: Bowel sounds are normal. There is no distension.      Palpations: Abdomen is soft.      Tenderness: There is no abdominal tenderness. There is no guarding or rebound.   Musculoskeletal:      Cervical back: Normal range of motion.      Right lower leg: No edema.      Left lower leg: No edema.   Skin:     General: Skin is warm and dry.      Capillary Refill: Capillary refill takes less than 2 seconds.   Neurological:      General: No focal deficit present.      Mental Status: He is alert and oriented to person, place, and time. Mental status is at baseline.   Psychiatric:         Mood and Affect: Mood normal.         Behavior: Behavior normal.         Thought Content: Thought content normal.               Significant Labs: All pertinent labs within the past 24 hours have been reviewed.    Significant Imaging: I have reviewed all pertinent imaging results/findings within the past 24 hours.

## 2025-04-02 NOTE — PT/OT/SLP PROGRESS
Physical Therapy   Progress Note    Patient Name:  Zelalem Gamez  MRN: [id number]    Admit Date: 3/27/2025  Admitting Diagnosis:  Symptomatic anemia  Length of Stay: 5 days  Recent Surgery: Procedure(s) (LRB):  EGD (ESOPHAGOGASTRODUODENOSCOPY) (N/A) 2 Days Post-Op    Recommendations:     Discharge Recommendations: moderate intensity therapy  Equipment recommendations: hospital bed and lift device  Barriers to discharge: Increased level of skilled assistance required and Fall risk     Assessment:     Zelalem Gamez is a 74 y.o. male [place] [facility] [place] with medical diagnosis of Symptomatic anemia. Pt presents with weakness, impaired endurance, impaired self care skills, impaired functional mobility, gait instability, impaired balance, decreased coordination, decreased lower extremity function, impaired muscle length. Pt is progressing towards goals, but has not yet reached prior level of function.     Pt agreeable to therapy session. Pt educated that he is NWB RUE but continues to put weight through it. Requires significant assist for bed mobility. L leg contracture keeps knee in ~90 deg of flexion with foot 8-10 inches from reaching floor. Performed stand pivot from bed <> recliner with 2 person assist on RLE. Pt states chair is too uncomfortable for him to sit in hence return to bed. Will continue to assess mobility as tolerated.    Zelalem Gamez would benefit from continued acute PT intervention to improve quality of life, focus on recovery of impairments, provide patient/caregiver education, reduce fall risk, and maximize (I) and safety with functional mobility. Once medically stable, recommending pt discharge to moderate intensity therapy. Patient continues to demonstrate the need for moderate intensity therapy on a daily basis post acute exhibited by decreased independence with self-care and functional mobility     Rehab Prognosis: Fair    Plan:     During this hospitalization, patient to be  seen 3 x/week to address the identified rehab impairments via gait training, therapeutic activities, therapeutic exercises, neuromuscular re-education, wheelchair management/training and progress towards stated goals.     Plan of Care Expires:  04/27/25  Plan of Care reviewed with: patient    This plan of care has been discussed with the patient/caregiver, who was included in its development and is in agreement with the identified goals and treatment plan.     Subjective     Communicated with RN prior to session.  Patient found supine upon PT entry to room, agreeable to therapy session. Pt alone during session.    Patient/Family Comments/goals: none stated    Pain/Comfort:  Pain Rating 1: 0/10  Pain Rating Post-Intervention 1: 0/10    Patients cultural, spiritual, Evangelical conflicts given the current situation: None identified     Objective:     Patient found with: telemetry, oxygen, PureWick    General Precautions: Standard, fall   Orthopedic Precautions:RUE non weight bearing   Braces:     Oxygen Device: nasal cannula    Cognition:  Pt is Alert during session.    Therapist provided skilled verbal and tactile cueing to facilitate the following functional mobility tasks. Listed tasks are focused on recovery of impairments and improving pt's independence and efficiency with bed mobility, transfers and ambulation as able.     Bed Mobility:  Supine > Sit: Moderate Assistance  Sit > Supine: Moderate Assistance    Transfers:   Sit <> Stand Transfer: Maximum Assistancex2 from eob with no AD   Bed <> Chair: Maximum Assistancex2 with no AD                    Balance:  Dynamic Sitting: mod with progression to SBA  Standing:  Static: 0: Needs MAXIMAL assist to maintain    Dynamic: 0: N/A    Outcome Measure: AM-PAC 6 CLICK MOBILITY  Total Score:10     Patient/Caregiver Education and Additional Therapeutic Activities/Exercises       Provided pt/caregiver education regarding:   PT POC and goals for therapy   Safety with  mobility and fall risk   Safe management of AD as needed   Energy conservation techniques   Instruction on use of call button and importance of calling nursing staff for assistance with mobility     Patient/caregiver able to verbalize understanding; will follow-up with pt/caregiver during current admit for additional questions/concerns within scope of practice.     White board updated.     Patient left HOB elevated with all lines intact and call button in reach .    Goals:     Multidisciplinary Problems       Physical Therapy Goals          Problem: Physical Therapy    Goal Priority Disciplines Outcome Interventions   Physical Therapy Goal     PT, PT/OT Progressing    Description: Goals to be met by:      Patient will increase functional independence with mobility by performin. Supine to sit with MInimal Assistance  2. Sit to supine with MInimal Assistance  3. Rolling to Left and Right with Stand-by Assistance.  4. Sit to stand transfer with Moderate Assistance  5. Bed to chair transfer with Moderate Assistance using slideboard vs squat pivot technique.   6. Wheelchair propulsion x100 feet with Stand-by Assistance using UE/LE  7. Sitting at edge of bed x10 minutes with Stand-by Assistance                         Time Tracking:       PT Received On: 25  PT Start Time: 1104     PT Stop Time: 1132  PT Total Time (min): 28 min     Billable Minutes: Therapeutic Activity 14 and Neuromuscular Re-education 14    2025

## 2025-04-02 NOTE — PHYSICIAN QUERY
Please clarify the Heart Failure diagnosis:  Acute on chronic systolic heart failure (HFrEF or HFmrEF)

## 2025-04-02 NOTE — ASSESSMENT & PLAN NOTE
Patient with Chronic debility due to age-related physical debility. The patient's latest AMPAC (Activity Measure for Post Acute Care) Score is listed below.    AM-PAC Score - How much help does the patient need for each activity listed  Basic Mobility Total Score: 10  Turning over in bed (including adjusting bedclothes, sheets and blankets)?: A lot  Sitting down on and standing up from a chair with arms (e.g., wheelchair, bedside commode, etc.): A lot  Moving from lying on back to sitting on the side of the bed?: A lot  Moving to and from a bed to a chair (including a wheelchair)?: A lot  Need to walk in hospital room?: Unable  Climbing 3-5 steps with a railing?: Unable    Plan  - PT/OT consulted  - Fall precautions in place

## 2025-04-02 NOTE — PLAN OF CARE
[facility] - Internal Medicine Telemetry  Discharge Reassessment    Primary Care Provider: Deysi Pink MD    Expected Discharge Date: 4/3/2025    Reassessment (most recent)       Discharge Reassessment - 04/02/25 1630          Discharge Reassessment    Assessment Type Discharge Planning Reassessment (P)      Did the patient's condition or plan change since previous assessment? No (P)      Discharge Plan discussed with: Patient (P)      Communicated МАРИНА with patient/caregiver Yes (P)      Discharge Plan A Skilled Nursing Facility (P)      Discharge Plan B Home Health;Home (P)      DME Needed Upon Discharge  none (P)      Transition of Care Barriers None (P)      Why the patient remains in the hospital Requires continued medical care (P)         Post-Acute Status    Post-Acute Authorization Placement (P)      Post-Acute Placement Status Pending post-acute provider review/more information requested (P)      Coverage MEDICARE - MEDICARE PART A & B - (P)      Hospital Resources/Appts/Education Provided Provided education on problems/symptoms using teachback (P)                  Discharge Plan A and Plan B have been determined by review of patient's clinical status, future medical and therapeutic needs, and coverage/benefits for post-acute care in coordination with multidisciplinary team members.       BINU completed DP reassessment w/ patient at the bedside. Patient confirmed plan of re-admit to [facility] once medically ready. BINU sent updated clinicals to facility via [employer]; pending post-acute provider review.    BINU will continue to follow.                    AAYUSH Tidwell, LMSW  [facility]  Case Management  Ext. [phone]

## 2025-04-02 NOTE — PHYSICIAN QUERY
Please provide the integumentary diagnosis related to the documentation of Left Heel:  Pressure Injury/Decubitus Ulcer, Unstageable

## 2025-04-02 NOTE — PROGRESS NOTES
04/02/25 0333   Vital Signs   Temp 97.7 °F (36.5 °C)   Temp Source Oral   Pulse 64   Resp 18   SpO2 (!) 91 %   BP (!) 175/65   MAP (mmHg) 102     Pt presented hypertensive with a spo2 goal of 90%. Pt was asymptomatic. Nurse gave 0600 per MD.    No acute s/s of distress. Pt AAOx4, resting comfortably in bed, respirations E/UL, updated on POC, wheels locked and in low position, call bell within reach, Comfort positioning and restroom needs were addressed. Necessary items were placed within reach. Plan of care ongoing. No new orders at this time.

## 2025-04-02 NOTE — ASSESSMENT & PLAN NOTE
Patient has paroxysmal (<7 days) atrial fibrillation. Patient is currently in sinus rhythm. [id number] Score: 2. The patients heart rate in the last 24 hours is as follows:  Pulse  Min: 53  Max: 64     Antiarrhythmics  , Daily, Oral  , Daily, Oral  amiodarone tablet 200 mg, Daily, Oral  metoprolol succinate (TOPROL-XL) 24 hr tablet 25 mg, Daily, Oral    Anticoagulants       Plan  - Replete lytes with a goal of K>4, Mg >2  - Patient is not anticoagulated due to risk of bleeding  - Patient's afib is currently controlled

## 2025-04-02 NOTE — PT/OT/SLP PROGRESS
Occupational Therapy   Co-Treatment  Co-treatment performed due to patient's multiple deficits requiring two skilled therapists to appropriately and safely assess patient's strength and endurance while facilitating functional tasks in addition to accommodating for patient's activity tolerance.        Name: Zelalem Gamez  MRN: [id number]  Admitting Diagnosis:  Symptomatic anemia  2 Days Post-Op    Recommendations:     Discharge Recommendations: Moderate Intensity Therapy  Discharge Equipment Recommendations:  lift device, hospital bed  Barriers to discharge:   (Increased skilled assistance required)    Assessment:     Zelalem Gamez is a 74 y.o. male with a medical diagnosis of Symptomatic anemia.  He presents with the following performance deficits affecting function are weakness, impaired endurance, impaired self care skills, impaired functional mobility, impaired balance, decreased coordination, impaired cognition, decreased upper extremity function, decreased lower extremity function, decreased ROM, abnormal tone, pain, decreased safety awareness, impaired skin, impaired joint extensibility.     Pt agreeable to therapy but did not tolerate well. Pt is significantly limited by his performance deficits which is impacting his performance with ADLs IADLs, functional mobility, and transfers. Due to the severity of high muscle tone in the L LE, flexed hip, wound on L heel, & R clavicle fracture, pt is unable to complete MRADLS, transfers or mobilize on his own.     Pt would continue to benefit from skilled OT services to maximize functional independence with ADLs and functional mobility, reduce caregiver burden, and facilitate safe discharge in the least restrictive environment.      Rehab Prognosis:  Fair; patient would benefit from acute skilled OT services to address these deficits and reach maximum level of function.       Plan:     Patient to be seen 4 x/week to address the above listed problems via  self-care/home management, therapeutic activities, therapeutic exercises  Plan of Care Expires: 04/28/25  Plan of Care Reviewed with: patient    Subjective     Chief Complaint: I want to get OOB  Patient/Family Comments/goals: get OOB and get into a wheelchair  Pain/Comfort:  Pain Rating 1: 0/10    Objective:     Communicated with: nurse prior to session.  Patient found HOB elevated with telemetry, oxygen, PureWick upon OT entry to room.    General Precautions: Standard, fall    Orthopedic Precautions:RUE non weight bearing  Braces: N/A  Respiratory Status: Nasal cannula, flow 1 L/min     Occupational Performance:     Bed Mobility:    Patient completed Scooting with mod assistance    Patient completed Supine to Sit with moderate assistance    Patient completed Sit to Supine with moderate assistance     Functional Mobility/Transfers:  Patient completed Sit <> Stand Transfer (from EOB) with maximal assistance and of 2 persons via gait belt    Patient completed Bed <> Chair Transfer using Step Transfer technique with maximal assistance and of 2 persons with mahin belt    Functional Mobility: pt unable to ambulate due to inability to WB through L LE, flexed hip, R clavical fracture (unable to use RW), poor coordination, & generalized weakness    Activities of Daily Living:  Grooming: stand by assistance to complete facial hygiene while seated EOB    Upper Body Dressing: maximal assistance to don gown over back while seated EOB    AMPAC 6 Click ADL: 15    Treatment & Education:  -Education on task modification to maximize safety and (I) during bed mobility, ADLs and mobility/transfers  -Education on importance of OOB activity to maintain/improve overall strength, activity tolerance and promote recovery  -Pt educated on hand placement during transfers  -Pt educated on bed positioning to minimize joint stiffness & contractures that would further inhibit pt's overall mobility  -Pt educated on orthopedic precautions to  maintain pt safety & promote recovery  -Pt educated to call for assistance and to transfer with hospital staff only  Pt had no further questions & when asked whether there were any concerns pt reported none.     Patient left HOB elevated with all lines intact, call button in reach, and bed alarm on    GOALS:   Multidisciplinary Problems       Occupational Therapy Goals          Problem: Occupational Therapy    Goal Priority Disciplines Outcome Interventions   Occupational Therapy Goal     OT, PT/OT Progressing    Description: Goals to be met by: 04/28/2025     Patient will increase functional independence with ADLs by performing:    UE Dressing with Supervision.  LE Dressing with Stand-by Assistance.  Grooming while seated with Set-up Assistance.  Toileting from bedside commode with Contact Guard Assistance for hygiene and clothing management.   Supine to sit with [place].  Stand pivot transfer with Contact Guard Assistance  Toilet transfer to bedside commode with Contact Guard Assistance.                         Time Tracking:     OT Date of Treatment: 04/02/25  OT Start Time: 1103  OT Stop Time: 1133  OT Total Time (min): 30 min    Billable Minutes:Self Care/Home Management 15  Therapeutic Exercise 15    OT/LUCRECIA: OT          4/2/2025

## 2025-04-02 NOTE — ASSESSMENT & PLAN NOTE
Patient found to have small pleural effusion on imaging. I have personally reviewed and interpreted the following imaging: Xray. A thoracentesis was deferred. Most likely etiology includes Congestive Heart Failure. Management to include Diuresis    Patient with Hypoxic Respiratory failure which is Acute on chronic.  he is on home oxygen at 1 LPM. Supplemental oxygen was provided and noted- Oxygen Concentration (%):  [4] 4 Contributing diagnoses includes - CHF and Pleural effusion Labs and images were reviewed. Patient Has not had a recent ABG. Will treat underlying causes and adjust management of respiratory failure as follows    - lasix 40 mg IV qd to resume following scope  - echo Mild global hypokinesis present. There is mildly reduced systolic function. Ejection fraction is approximately 45%. There is indeterminate diastolic function.

## 2025-04-02 NOTE — ASSESSMENT & PLAN NOTE
Creatine stable for now. BMP reviewed- noted Estimated Creatinine Clearance: 11 mL/min (A) (based on SCr of 3.7 mg/dL (H)). according to latest data. Based on current GFR, CKD stage is stage 3 - GFR 30-59.  Monitor UOP and serial BMP and adjust therapy as needed. Renally dose meds. Avoid nephrotoxic medications and procedures.    ANGEL is likely due to pre-renal azotemia due to dehydration. Baseline creatinine is 2.8. Most recent creatinine and eGFR are listed below.  Recent Labs     03/31/25  0759 04/01/25  0420   CREATININE 3.5* 3.7*   EGFRNORACEVR 17* 16*      Plan  - ANGEL is worsening. Will adjust treatment as follows: 500 ml NS bolus  - Avoid nephrotoxins and renally dose meds for GFR listed above  - Monitor urine output, serial BMP, and adjust therapy as needed

## 2025-04-02 NOTE — PHYSICIAN QUERY
Please provide the integumentary diagnosis related to the documentation of Left Greater Trochanter:    Pressure Injury/Decubitus Ulcer, Unstageable

## 2025-04-03 PROBLEM — Z99.2 DIALYSIS PATIENT: Status: ACTIVE | Noted: 2025-04-03

## 2025-04-03 PROBLEM — E87.70 HYPERVOLEMIA: Status: ACTIVE | Noted: 2025-04-03

## 2025-04-03 PROBLEM — N18.6 ESRD (END STAGE RENAL DISEASE) ON DIALYSIS: Status: ACTIVE | Noted: 2025-04-03

## 2025-04-03 PROBLEM — Z99.2 ESRD (END STAGE RENAL DISEASE) ON DIALYSIS: Status: ACTIVE | Noted: 2025-04-03

## 2025-04-03 PROBLEM — N18.32 ACUTE RENAL FAILURE SUPERIMPOSED ON STAGE 3B CHRONIC KIDNEY DISEASE: Status: ACTIVE | Noted: 2025-03-28

## 2025-04-03 LAB
ABSOLUTE EOSINOPHIL (OHS): 0 K/UL
ABSOLUTE MONOCYTE (OHS): 0.41 K/UL (ref 0.3–1)
ABSOLUTE NEUTROPHIL COUNT (OHS): 8.39 K/UL (ref 1.8–7.7)
ANION GAP (OHS): 11 MMOL/L (ref 8–16)
BASOPHILS # BLD AUTO: 0.01 K/UL
BASOPHILS NFR BLD AUTO: 0.1 %
BNP SERPL-MCNC: 1185 PG/ML (ref 0–99)
BUN SERPL-MCNC: 44 MG/DL (ref 8–23)
CALCIUM SERPL-MCNC: 7.9 MG/DL (ref 8.7–10.5)
CHLORIDE SERPL-SCNC: 101 MMOL/L (ref 95–110)
CO2 SERPL-SCNC: 25 MMOL/L (ref 23–29)
CREAT SERPL-MCNC: 4.1 MG/DL (ref 0.5–1.4)
CREAT UR-MCNC: 44 MG/DL (ref 23–375)
CREAT UR-MCNC: 45 MG/DL (ref 23–375)
ERYTHROCYTE [DISTWIDTH] IN BLOOD BY AUTOMATED COUNT: 21.8 % (ref 11.5–14.5)
GFR SERPLBLD CREATININE-BSD FMLA CKD-EPI: 14 ML/MIN/1.73/M2
GLUCOSE SERPL-MCNC: 90 MG/DL (ref 70–110)
HBV SURFACE AG SERPL QL IA: NORMAL
HCT VFR BLD AUTO: 26.9 % (ref 40–54)
HGB BLD-MCNC: 8.7 GM/DL (ref 14–18)
IMM GRANULOCYTES # BLD AUTO: 0.06 K/UL (ref 0–0.04)
IMM GRANULOCYTES NFR BLD AUTO: 0.6 % (ref 0–0.5)
LYMPHOCYTES # BLD AUTO: 0.67 K/UL (ref 1–4.8)
MAGNESIUM SERPL-MCNC: 2.8 MG/DL (ref 1.6–2.6)
MCH RBC QN AUTO: 31.5 PG (ref 27–31)
MCHC RBC AUTO-ENTMCNC: 32.3 G/DL (ref 32–36)
MCV RBC AUTO: 98 FL (ref 82–98)
NUCLEATED RBC (/100WBC) (OHS): 0 /100 WBC
OHS QRS DURATION: 86 MS
OHS QTC CALCULATION: 486 MS
OSMOLALITY UR: 455 MOSM/KG (ref 50–1200)
PLATELET # BLD AUTO: 289 K/UL (ref 150–450)
PMV BLD AUTO: 11.1 FL (ref 9.2–12.9)
POCT GLUCOSE: 148 MG/DL (ref 70–110)
POTASSIUM SERPL-SCNC: 4.6 MMOL/L (ref 3.5–5.1)
PROT UR-MCNC: 405 MG/DL
PROT/CREAT UR: 9 MG/G{CREAT}
RBC # BLD AUTO: 2.76 M/UL (ref 4.6–6.2)
RELATIVE EOSINOPHIL (OHS): 0 %
RELATIVE LYMPHOCYTE (OHS): 7 % (ref 18–48)
RELATIVE MONOCYTE (OHS): 4.3 % (ref 4–15)
RELATIVE NEUTROPHIL (OHS): 88 % (ref 38–73)
SODIUM SERPL-SCNC: 137 MMOL/L (ref 136–145)
SODIUM UR-SCNC: 132 MMOL/L (ref 20–250)
UUN UR-MCNC: 280 MG/DL (ref 140–1050)
WBC # BLD AUTO: 9.54 K/UL (ref 3.9–12.7)

## 2025-04-03 PROCEDURE — 5A1D70Z PERFORMANCE OF URINARY FILTRATION, INTERMITTENT, LESS THAN 6 HOURS PER DAY: ICD-10-PCS | Performed by: INTERNAL MEDICINE

## 2025-04-03 PROCEDURE — 27000221 HC OXYGEN, UP TO 24 HOURS

## 2025-04-03 PROCEDURE — 36415 COLL VENOUS BLD VENIPUNCTURE: CPT | Performed by: STUDENT IN AN ORGANIZED HEALTH CARE EDUCATION/TRAINING PROGRAM

## 2025-04-03 PROCEDURE — 21400001 HC TELEMETRY ROOM

## 2025-04-03 PROCEDURE — 93005 ELECTROCARDIOGRAM TRACING: CPT

## 2025-04-03 PROCEDURE — 82310 ASSAY OF CALCIUM: CPT | Performed by: PHYSICIAN ASSISTANT

## 2025-04-03 PROCEDURE — 36415 COLL VENOUS BLD VENIPUNCTURE: CPT | Performed by: PHYSICIAN ASSISTANT

## 2025-04-03 PROCEDURE — 99900035 HC TECH TIME PER 15 MIN (STAT)

## 2025-04-03 PROCEDURE — 25000003 PHARM REV CODE 250: Performed by: STUDENT IN AN ORGANIZED HEALTH CARE EDUCATION/TRAINING PROGRAM

## 2025-04-03 PROCEDURE — 25000003 PHARM REV CODE 250

## 2025-04-03 PROCEDURE — 87340 HEPATITIS B SURFACE AG IA: CPT | Performed by: STUDENT IN AN ORGANIZED HEALTH CARE EDUCATION/TRAINING PROGRAM

## 2025-04-03 PROCEDURE — 93010 ELECTROCARDIOGRAM REPORT: CPT | Mod: ,,, | Performed by: INTERNAL MEDICINE

## 2025-04-03 PROCEDURE — 83880 ASSAY OF NATRIURETIC PEPTIDE: CPT

## 2025-04-03 PROCEDURE — 99223 1ST HOSP IP/OBS HIGH 75: CPT | Mod: GC,,, | Performed by: INTERNAL MEDICINE

## 2025-04-03 PROCEDURE — 94640 AIRWAY INHALATION TREATMENT: CPT

## 2025-04-03 PROCEDURE — 36415 COLL VENOUS BLD VENIPUNCTURE: CPT

## 2025-04-03 PROCEDURE — 94761 N-INVAS EAR/PLS OXIMETRY MLT: CPT

## 2025-04-03 PROCEDURE — 25000242 PHARM REV CODE 250 ALT 637 W/ HCPCS: Performed by: PHYSICIAN ASSISTANT

## 2025-04-03 PROCEDURE — 80100014 HC HEMODIALYSIS 1:1

## 2025-04-03 PROCEDURE — 84540 ASSAY OF URINE/UREA-N: CPT

## 2025-04-03 PROCEDURE — 84300 ASSAY OF URINE SODIUM: CPT

## 2025-04-03 PROCEDURE — 63600175 PHARM REV CODE 636 W HCPCS: Performed by: PHYSICIAN ASSISTANT

## 2025-04-03 PROCEDURE — 84156 ASSAY OF PROTEIN URINE: CPT

## 2025-04-03 PROCEDURE — 83735 ASSAY OF MAGNESIUM: CPT

## 2025-04-03 PROCEDURE — 82570 ASSAY OF URINE CREATININE: CPT

## 2025-04-03 PROCEDURE — 85025 COMPLETE CBC W/AUTO DIFF WBC: CPT | Performed by: PHYSICIAN ASSISTANT

## 2025-04-03 PROCEDURE — 83935 ASSAY OF URINE OSMOLALITY: CPT

## 2025-04-03 PROCEDURE — 25000003 PHARM REV CODE 250: Performed by: PHYSICIAN ASSISTANT

## 2025-04-03 RX ORDER — FUROSEMIDE 10 MG/ML
20 INJECTION INTRAMUSCULAR; INTRAVENOUS ONCE
Status: DISCONTINUED | OUTPATIENT
Start: 2025-04-03 | End: 2025-04-03

## 2025-04-03 RX ORDER — SODIUM CHLORIDE 9 MG/ML
INJECTION, SOLUTION INTRAVENOUS ONCE
Status: CANCELLED | OUTPATIENT
Start: 2025-04-03 | End: 2025-04-03

## 2025-04-03 RX ORDER — FUROSEMIDE 10 MG/ML
40 INJECTION INTRAMUSCULAR; INTRAVENOUS 2 TIMES DAILY
Status: DISCONTINUED | OUTPATIENT
Start: 2025-04-03 | End: 2025-04-03

## 2025-04-03 RX ORDER — CIPROFLOXACIN 750 MG/1
750 TABLET, FILM COATED ORAL
Status: DISCONTINUED | OUTPATIENT
Start: 2025-04-03 | End: 2025-04-04 | Stop reason: HOSPADM

## 2025-04-03 RX ORDER — MUPIROCIN 20 MG/G
OINTMENT TOPICAL 2 TIMES DAILY
Status: CANCELLED | OUTPATIENT
Start: 2025-04-03 | End: 2025-04-08

## 2025-04-03 RX ORDER — SODIUM CHLORIDE 9 MG/ML
INJECTION, SOLUTION INTRAVENOUS
Status: CANCELLED | OUTPATIENT
Start: 2025-04-03

## 2025-04-03 RX ADMIN — ALBUTEROL SULFATE 2.5 MG: 2.5 SOLUTION RESPIRATORY (INHALATION) at 09:04

## 2025-04-03 RX ADMIN — PREDNISONE 60 MG: 20 TABLET ORAL at 09:04

## 2025-04-03 RX ADMIN — HYDRALAZINE HYDROCHLORIDE 50 MG: 50 TABLET ORAL at 06:04

## 2025-04-03 RX ADMIN — SEVELAMER CARBONATE 800 MG: 800 TABLET, FILM COATED ORAL at 09:04

## 2025-04-03 RX ADMIN — SULFAMETHOXAZOLE AND TRIMETHOPRIM 1 TABLET: 400; 80 TABLET ORAL at 09:04

## 2025-04-03 RX ADMIN — AMLODIPINE BESYLATE 10 MG: 10 TABLET ORAL at 09:04

## 2025-04-03 RX ADMIN — PANTOPRAZOLE SODIUM 40 MG: 40 TABLET, DELAYED RELEASE ORAL at 05:04

## 2025-04-03 RX ADMIN — SENNOSIDES 8.6 MG: 8.6 TABLET, FILM COATED ORAL at 09:04

## 2025-04-03 RX ADMIN — Medication 2000 UNITS: at 09:04

## 2025-04-03 RX ADMIN — ALBUTEROL SULFATE 2.5 MG: 2.5 SOLUTION RESPIRATORY (INHALATION) at 04:04

## 2025-04-03 RX ADMIN — ALBUTEROL SULFATE 2.5 MG: 2.5 SOLUTION RESPIRATORY (INHALATION) at 12:04

## 2025-04-03 RX ADMIN — SEVELAMER CARBONATE 800 MG: 800 TABLET, FILM COATED ORAL at 02:04

## 2025-04-03 RX ADMIN — ACETAMINOPHEN 650 MG: 325 TABLET ORAL at 03:04

## 2025-04-03 RX ADMIN — PANTOPRAZOLE SODIUM 40 MG: 40 TABLET, DELAYED RELEASE ORAL at 06:04

## 2025-04-03 RX ADMIN — FUROSEMIDE 40 MG: 10 INJECTION, SOLUTION INTRAVENOUS at 08:04

## 2025-04-03 RX ADMIN — ZONISAMIDE 100 MG: 100 CAPSULE ORAL at 10:04

## 2025-04-03 RX ADMIN — FERROUS SULFATE TAB 325 MG (65 MG ELEMENTAL FE) 1 EACH: 325 (65 FE) TAB at 09:04

## 2025-04-03 RX ADMIN — Medication 250 MG: at 09:04

## 2025-04-03 RX ADMIN — AMIODARONE HYDROCHLORIDE 200 MG: 200 TABLET ORAL at 09:04

## 2025-04-03 RX ADMIN — TORSEMIDE 50 MG: 20 TABLET ORAL at 05:04

## 2025-04-03 RX ADMIN — ALBUTEROL SULFATE 2.5 MG: 2.5 SOLUTION RESPIRATORY (INHALATION) at 07:04

## 2025-04-03 RX ADMIN — TRAZODONE HYDROCHLORIDE 25 MG: 50 TABLET ORAL at 10:04

## 2025-04-03 RX ADMIN — PHENOBARBITAL 32.4 MG: 32.4 TABLET ORAL at 09:04

## 2025-04-03 RX ADMIN — SEVELAMER CARBONATE 800 MG: 800 TABLET, FILM COATED ORAL at 05:04

## 2025-04-03 RX ADMIN — POLYETHYLENE GLYCOL 3350 17 G: 17 POWDER, FOR SOLUTION ORAL at 09:04

## 2025-04-03 RX ADMIN — METOPROLOL SUCCINATE 25 MG: 25 TABLET, EXTENDED RELEASE ORAL at 09:04

## 2025-04-03 RX ADMIN — CIPROFLOXACIN HYDROCHLORIDE 750 MG: 750 TABLET, FILM COATED ORAL at 10:04

## 2025-04-03 NOTE — ASSESSMENT & PLAN NOTE
This is a dialysis dependent ANGEL (due to rhabdomyolysis) patient who is dialyzed MWF. He has received no dialysis sessions during this hospitalization. Nephrology is being made aware of his dialysis needs at the time of consult as there is concern over his worsening respiratory status. Chest XR and physical exam point to fluid overload and on interview with the patient his oxygen requirements are greater than normal despite at attempts at diuresis this hospitalization. Note: he was showing signs of overt proteinuria about 1 year ago.       Dialysis dependent ANGEL  CKD4  Past Rhabdomyolysis ANGEL  Proteinuria     Recommendations:  Dialysis tonight or tomorrow w/ plan for 2L off in 3 hours  Obtain UPCR  Start torsemide 50mg BID

## 2025-04-03 NOTE — CONSULTS
[facility] - Internal Medicine Telemetry  Nephrology  Consult Note    Patient Name: Zelalem Gamez  MRN: [id number]  Admission Date: 3/27/2025  Hospital Length of Stay: 6 days  Attending Provider: Ramon Bond*   Primary Care Physician: Deysi Pink MD  Principal Problem:Acute on chronic respiratory failure with hypoxemia    Inpatient consult to Nephrology  Consult performed by: Goldy Aragon DO  Consult ordered by: Destiny Machado PA-C        Subjective:     HPI: 74 year old male with a pMHx of FrEF (EF 35%), hypertension, peripheral arterial disease, chronic kidney disease on hemodialysis (MWF), seizure disorder who presentsafter routine labs revealed hemoglobin of 6.6. He is currently residing at a SNF after being discharged from the hospital on 03/07. Patient was admitted from 2/21-03/07 after a syncopal event. He was found to have several issues going on, including acute blood loss anemia requiring 2 units PRBCs. At this time, he also had an ANGEL on CKD along with rhabdomyolysis, so a tunneled catheter was placed and he was started on dialysis. He has been going to dialysis regularly. He denies headache, fever, chills, hemoptysis, decreased appetite, chest pain, abdominal pain, nausea, vomiting, dysuria, hematuria, or lower extremity pain/swelling.      In ED, Pt AFVSS on 1L NC. Hgb 6.6>6.8. No leukocytosis. K 3.4. Cr 3, previously 2.8. Trop 19. CXR: Findings suggestive of pulmonary edema with bilateral pleural effusions, left greater than right. Admitted to  for c/f GIB.     On chart review he is a dialysis dependent ANGEL patient (MWF). Nephrology is being consulted for dialysis in the setting of volume overload.     Past Medical History:   Diagnosis Date    Arthritis     Epilepsy     Hyperglycemia     Stroke        Past Surgical History:   Procedure Laterality Date    CHOLECYSTECTOMY      ESOPHAGOGASTRODUODENOSCOPY N/A 3/31/2025    Procedure: EGD (ESOPHAGOGASTRODUODENOSCOPY);  Surgeon:  Jose Meyer MD;  Location: [facility] ([address] [place] [address]);  Service: Endoscopy;  Laterality: N/A;    JOINT REPLACEMENT      SKIN CANCER EXCISION         Review of patient's allergies indicates:  No Known Allergies  Current Facility-Administered Medications   Medication Frequency    0.9%  NaCl infusion (for blood administration) Q24H PRN    acetaminophen tablet 650 mg Q8H PRN    albuterol sulfate nebulizer solution 2.5 mg Q4H WAKE    amiodarone tablet 200 mg Daily    amLODIPine tablet 10 mg Daily    ARIPiprazole tablet 2 mg Daily    ascorbic acid (vitamin C) tablet 250 mg Daily    ciprofloxacin HCl tablet 750 mg Q24H    dextrose 50% injection 12.5 g PRN    dextrose 50% injection 25 g PRN    ferrous sulfate tablet 1 each Daily    furosemide injection 20 mg Once    furosemide injection 40 mg BID    glucagon (human recombinant) injection 1 mg PRN    glucose chewable tablet 16 g PRN    glucose chewable tablet 24 g PRN    hydrALAZINE tablet 50 mg Q8H    melatonin tablet 6 mg Nightly PRN    metoprolol succinate (TOPROL-XL) 24 hr tablet 25 mg Daily    naloxone 0.4 mg/mL injection 0.02 mg PRN    ondansetron disintegrating tablet 8 mg Q8H PRN    pantoprazole EC tablet 40 mg BID AC    PHENobarbitaL tablet 32.4 mg Daily    polyethylene glycol packet 17 g Daily    prochlorperazine injection Soln 5 mg Q6H PRN    senna tablet 8.6 mg Daily    sevelamer carbonate tablet 800 mg TID WM    trazodone split tablet 25 mg Nightly PRN    vitamin D 1000 units tablet 2,000 Units Daily    zonisamide capsule 100 mg Daily     Family History       Problem Relation (Age of Onset)    Diabetes Mother          Tobacco Use    Smoking status: Every Day     Current packs/day: 1.00     Average packs/day: 1 pack/day for 45.0 years (45.0 ttl pk-yrs)     Types: Cigarettes    Smokeless tobacco: Never   Substance and Sexual Activity    Alcohol use: No    Drug use: No    Sexual activity: Not on file     Review of Systems  Objective:     Vital Signs (Most  Recent):  Temp: 97.9 °F (36.6 °C) (25 1110)  Pulse: 63 (25 1506)  Resp: 18 (25 1227)  BP: (!) 118/50 (25 1400)  SpO2: (!) 86 % (25 1227) Vital Signs (24h Range):  Temp:  [97.5 °F (36.4 °C)-97.9 °F (36.6 °C)] 97.9 °F (36.6 °C)  Pulse:  [51-87] 63  Resp:  [18-20] 18  SpO2:  [79 %-96 %] 86 %  BP: ()/(32-50) 118/50     Weight: 45 kg (99 lb 3.3 oz) (25 0642)  Body mass index is 16.01 kg/m².  Body surface area is 1.45 meters squared.    I/O last 3 completed shifts:  In: 480 [P.O.:480]  Out: 550 [Urine:550]     Physical Exam  Vitals and nursing note reviewed.   Constitutional:       General: He is not in acute distress.     Appearance: He is well-developed. He is cachectic. He is ill-appearing (chronically).   HENT:      Head: Normocephalic and atraumatic.   Eyes:      Extraocular Movements: Extraocular movements intact.   Neck:      Vascular: JVD present.   Cardiovascular:      Rate and Rhythm: Normal rate and regular rhythm.      Heart sounds: Normal heart sounds.   Pulmonary:      Effort: No respiratory distress.      Breath sounds: Rales present.   Chest:      Comments: TDC in place to right chest wall  Abdominal:      General: There is no distension.   Musculoskeletal:         General: No tenderness. Normal range of motion.      Right lower le+ Pitting Edema present.      Left lower le+ Pitting Edema present.   Skin:     General: Skin is warm and dry.      Findings: No rash.   Neurological:      Mental Status: He is alert and oriented to person, place, and time.   Psychiatric:         Behavior: Behavior normal.         Thought Content: Thought content normal.         Judgment: Judgment normal.          Significant Labs:  All labs within the past 24 hours have been reviewed.    Significant Imaging:  Labs: Reviewed  Assessment/Plan:     Renal/  Dialysis patient  This is a dialysis dependent ANGEL (due to rhabdomyolysis) patient who is dialyzed MWF. He has received no  dialysis sessions during this hospitalization. Nephrology is being made aware of his dialysis needs at the time of consult as there is concern over his worsening respiratory status. Chest XR and physical exam point to fluid overload and on interview with the patient his oxygen requirements are greater than normal despite at attempts at diuresis this hospitalization. Note: he was showing signs of overt proteinuria about 1 year ago.       Dialysis dependent ANGEL  CKD4  Past Rhabdomyolysis ANGEL  Proteinuria     Recommendations:  Dialysis tonight or tomorrow w/ plan for 2L off in 3 hours  Obtain UPCR  Start torsemide 50mg BID         Thank you for your consult. I will follow-up with patient. Please contact us if you have any additional questions.    Goldy Aragon, DO  Nephrology  [facility] - Internal Medicine Telemetry    ATTENDING PHYSICIAN ATTESTATION  I have personally verified the history and examined the patient. I thoroughly reviewed the demographic, clinical, laboratorial and imaging information available in medical records. I agree with the assessment and recommendations provided by the subspecialty resident who was under my supervision.

## 2025-04-03 NOTE — HPI
74 year old male with a pMHx of FrEF (EF 35%), hypertension, peripheral arterial disease, chronic kidney disease on hemodialysis (MWF), seizure disorder who presentsafter routine labs revealed hemoglobin of 6.6. He is currently residing at a SNF after being discharged from the hospital on 03/07. Patient was admitted from 2/21-03/07 after a syncopal event. He was found to have several issues going on, including acute blood loss anemia requiring 2 units PRBCs. At this time, he also had an ANGEL on CKD along with rhabdomyolysis, so a tunneled catheter was placed and he was started on dialysis. He has been going to dialysis regularly. He denies headache, fever, chills, hemoptysis, decreased appetite, chest pain, abdominal pain, nausea, vomiting, dysuria, hematuria, or lower extremity pain/swelling.      In ED, Pt AFVSS on 1L NC. Hgb 6.6>6.8. No leukocytosis. K 3.4. Cr 3, previously 2.8. Trop 19. CXR: Findings suggestive of pulmonary edema with bilateral pleural effusions, left greater than right. Admitted to  for c/f GIB.     On chart review he is a dialysis dependent ANGEL patient (MWF). Nephrology is being consulted for dialysis in the setting of volume overload.

## 2025-04-03 NOTE — PROGRESS NOTES
[facility] - Internal Medicine [facility] Medicine  Progress Note    Patient Name: Zelalem Gamez  MRN: [id number]  Patient Class: IP- Inpatient   Admission Date: 3/27/2025  Length of Stay: 6 days  Attending Physician: Ramon Bond*  Primary Care Provider: Deysi Pink MD    Subjective     Principal Problem:Acute on chronic respiratory failure with hypoxemia    HPI:  Mr. Zelalem Gamez arpit 74 year old male with a pMHx of FrEF (EF 35%), hypertension, peripheral arterial disease, chronic kidney disease on hemodialysis (MWF), seizure disorder who presentsafter routine labs revealed hemoglobin of 6.6. He is currently residing at a SNF after being discharged from the hospital on 03/07. Patient reports he has been having persistent constipation for several weeks. He has not noticed any blood in his stools, however, he reports that a nurse changes his briefs for him. He has been able to get out of bed with the help of PT but is mostly confined to his bed due to generalized weakness that has been ongoing since he was discharged from the hospital. He also endorses nasal congestion and cough with sputum production for several weeks. He is on 1-2L of O2 at baseline. Patient was admitted from 2/21-03/07 after a syncopal event. He was found to have several issues going on, including acute blood loss anemia requiring 2 units PRBCs. At this time, he also had an ANGEL on CKD along with rhabdomyolysis, so a tunneled catheter was placed and he was started on dialysis. He has been going to dialysis regularly. He denies headache, fever, chills, hemoptysis, decreased appetite, chest pain, abdominal pain, nausea, vomiting, dysuria, hematuria, or lower extremity pain/swelling.      In ED, Pt AFVSS on 1L NC. Hgb 6.6>6.8. No leukocytosis. K 3.4. Cr 3, previously 2.8. Trop 19. CXR: Findings suggestive of pulmonary edema with bilateral pleural effusions, left greater than right. Admitted to  for c/f GIB.      Overview/Hospital Course:  Pt admitted for evaluation of symptomatic anemia from clinic w/ Hgb 6.6. Pt afebrile and HDS. Started on GIB pathway and transfused 1 unit PRBC. IV protonix. GI consulted and tentatively plan for scope 3/31. CTA c/f stercoral proctitis w/ large stool burden. Discussed w/ CRS, patient stable, will evacuate stool and continue to monitor. Cr at baseline. Also w/ b/l pleural effusions was diuresed following transfusion.       Previous hospital management team hospital course above. I (Destiny Machado PA-C with Dr. Ramon Bond) took over this patient's care on 04/03/2025. Per chart review admitted for acute anemia with hgb 6.6. given 1 unit pRBC with improvement. GI consulted, inpatient EGD performed with Schatzki's ring but otherwise normal stomach/esophagus. Recommended outpatient colonoscopy. Hemoglobin remained at baseline, however, patient was noted to have worsening oxygen requirement and increasing creatinine (3.0 >> 4.1) over 5-6 days. Given 40 mg IV lasix but creatinine continued to worsen, per documentation there was some concern for pre-renal azotemia and patient was given a small fluid bolus without improvement. Repeat chest CT 04/02 revealed Findings of CHF and volume overload with cardiomegaly, moderate pericardial effusion, bilateral pleural effusions and superimposed pulmonary edema. At the time of my assessment patient expressed to me that he was receiving HD at the SNF every MWF but had not been dialyzed this admission. Nephrology consulted for volume management inpatient.     Interval History: MINO. Continues to have increased oxygen requirements, 92% on 4L with basline oxygen ~2L. Repeat CT chest reveals significant cardiomegaly, pleural effusions and volume overload.   Persistently worsening renal function on AM labs (3.0 >> 3.4 >> 3.8 >> 4.1). Per chart review patient had a TDC placed and started HD 03/04 - 03/05. Nephrology consulted today for HD and  fluid management needs.     Review of Systems   Constitutional:  Negative for activity change, chills and fever.   Respiratory:  Positive for cough (chronic, productive). Negative for chest tightness, shortness of breath and wheezing.    Cardiovascular:  Negative for chest pain.   Gastrointestinal:  Negative for abdominal pain, blood in stool, constipation, diarrhea, nausea and vomiting.   Genitourinary:  Negative for difficulty urinating and dysuria.   Musculoskeletal:  Positive for gait problem. Negative for arthralgias and back pain.   Neurological:  Negative for dizziness, light-headedness and headaches.   Psychiatric/Behavioral:  Negative for agitation.      Objective:     Vital Signs (Most Recent):  Temp: 97.9 °F (36.6 °C) (04/03/25 1110)  Pulse: 63 (04/03/25 1506)  Resp: 18 (04/03/25 1227)  BP: (!) 115/49 (04/03/25 1110)  SpO2: (!) 86 % (04/03/25 1227) Vital Signs (24h Range):  Temp:  [97.5 °F (36.4 °C)-97.9 °F (36.6 °C)] 97.9 °F (36.6 °C)  Pulse:  [51-87] 63  Resp:  [18-20] 18  SpO2:  [79 %-96 %] 86 %  BP: ()/(32-50) 115/49     Weight: 45 kg (99 lb 3.3 oz)  Body mass index is 16.01 kg/m².    Intake/Output Summary (Last 24 hours) at 4/3/2025 1518  Last data filed at 4/3/2025 1437  Gross per 24 hour   Intake --   Output 500 ml   Net -500 ml         Physical Exam  Vitals and nursing note reviewed.   Constitutional:       General: He is not in acute distress.     Appearance: He is well-developed. He is cachectic. He is ill-appearing (chronically).   HENT:      Head: Normocephalic and atraumatic.   Eyes:      Extraocular Movements: Extraocular movements intact.   Neck:      Vascular: JVD present.   Cardiovascular:      Rate and Rhythm: Normal rate and regular rhythm.      Heart sounds: Normal heart sounds.      Comments: Pitting edema to bilateral feet  Pulmonary:      Effort: Pulmonary effort is normal. No respiratory distress.      Breath sounds: Rales present.      Comments: Shallow inspiratory effort,  bibasilar rales  Chest:      Comments: TDC in place to right chest wall  Abdominal:      General: There is no distension.   Musculoskeletal:         General: No tenderness. Normal range of motion.      Right lower le+ Pitting Edema present.      Left lower le+ Pitting Edema present.   Skin:     General: Skin is warm and dry.      Findings: No rash.   Neurological:      Mental Status: He is alert and oriented to person, place, and time.   Psychiatric:         Behavior: Behavior normal.         Thought Content: Thought content normal.         Judgment: Judgment normal.     Significant Labs: All pertinent labs within the past 24 hours have been reviewed.    Significant Imaging: I have reviewed all pertinent imaging results/findings within the past 24 hours.      Assessment & Plan  Acute on chronic respiratory failure with hypoxemia  Pleural effusion  Pleural effusion  Hypervolemia   - 92% on 4L NC (baseline 1-2 L NC)  - CXR  with Persistent volume overload and pulmonary edema   - CT chest  with Findings of CHF and volume overload with cardiomegaly, moderate pericardial effusion, bilateral pleural effusions and superimposed pulmonary edema.   - given 40 mg IV lasix without improvement. Prior team suspected some dehydration component, given 500 mL fluid bolus without improvement  - ; on chart review by me noted to have recently started HD every MWF. Had not been dialyzed since admission, nephrology consulted  - wean oxygen as tolerated   Patient found to have small pleural effusion on imaging. I have personally reviewed and interpreted the following imaging: Xray. A thoracentesis was deferred. Most likely etiology includes Congestive Heart Failure. Management to include Diuresis    Patient with Hypoxic Respiratory failure which is Acute on chronic.  he is on home oxygen at 1 LPM. Supplemental oxygen was provided and noted- Oxygen Concentration (%):  [4] 4 Contributing diagnoses includes - CHF and  Pleural effusion Labs and images were reviewed. Patient Has not had a recent ABG. Will treat underlying causes and adjust management of respiratory failure as follows    - lasix 40 mg IV qd to resume following scope  - echo Mild global hypokinesis present. There is mildly reduced systolic function. Ejection fraction is approximately 45%. There is indeterminate diastolic function.   Seizure disorder  - continue phenobarbital and zonisamide  Generalized weakness  - PTOT  Symptomatic anemia  Anemia is likely due to unknown suspect GI. Most recent hemoglobin and hematocrit are listed below.  Recent Labs     04/01/25  0420 04/02/25  0417 04/03/25  0228   HGB 9.5* 9.7* 8.7*   HCT 29.5* 30.3* 26.9*     Plan  - Monitor serial CBC: Every 8 hours  - Transfuse PRBC if patient becomes hemodynamically unstable, symptomatic or H/H drops below 7/21.  - Patient has received 1 units of PRBCs on 3/27  - Patient's anemia is currently stable  - CLD, NPO at mn  - IV Protonix 40mg BID  - IVF hydration   - 2 large bore IV access  - anti-emetics as needed  - GI consulted, appreciate recs   - S/p EGD today     Impression:    - Mild Schatzki ring.                          - Normal stomach.                          - Normal examined duodenum.                          - No specimens collected.   Recommendation:        - [facility].                          - Resume previous diet.                          - Continue present medications.                          - Perform outpatient colonoscopy at appointment to                          be scheduled pending patient's clinical course and                          willingness to prep.                                                                          Atrial fibrillation  Patient has paroxysmal (<7 days) atrial fibrillation. Patient is currently in sinus rhythm. [id number] Score: 2. The patients heart rate in the last 24 hours is as follows:  Pulse  Min:  51  Max: 87     Antiarrhythmics  , Daily, Oral  , Daily, Oral  amiodarone tablet 200 mg, Daily, Oral  metoprolol succinate (TOPROL-XL) 24 hr tablet 25 mg, Daily, Oral    Anticoagulants    Not initiated due to anemia     Plan  - Replete lytes with a goal of K>4, Mg >2  - Patient is not anticoagulated due to risk of bleeding  - Patient's afib is currently controlled    Stercoral colitis  Constipation  Constipation   - bowel regimen for stool evacuation and continue monitoring  - bowel regimen for stool evacuation and continue monitoring    Tobacco dependency  Dangers of cigarette smoking were reviewed with patient in detail. Patient was Counseled for 3-10 minutes. Nicotine replacement options were discussed. Nicotine replacement was discussed- not prescribed per patient's request  Severe protein-calorie malnutrition  Nutrition consulted. Most recent weight and BMI monitored-     Measurements:  Wt Readings from Last 1 Encounters:   04/02/25 45 kg (99 lb 3.3 oz)   Body mass index is 16.01 kg/m².    Patient has been screened and assessed by RD.    Malnutrition Type:  Context: chronic illness  Level: severe    Malnutrition Characteristic Summary:  Energy Intake (Malnutrition): less than 75% for greater than or equal to 1 month  Subcutaneous Fat (Malnutrition): moderate depletion  Muscle Mass (Malnutrition): severe depletion    Interventions/Recommendations (treatment strategy):  1. Recommend liberalization to regular diet 2. RD following    Cachexia  Body mass index (BMI) less than 16.5  Body mass index (BMI) less than 16.5   Concern for cachexia as evidenced by BMI less than 20 in the presence of known chronic disease. Treatment to include nutrition consult.      Body mass index is 16.01 kg/m².  Albumin   Date Value Ref Range Status   03/27/2025 2.0 (L) 3.5 - 5.2 g/dL Final   03/07/2025 2.9 (L) 3.4 - 5.0 g/dL Final   10/08/2021 3.7 3.5 - 5.2 g/dL Final     Concern for cachexia as evidenced by BMI less than 20 in the  presence of known chronic disease. Treatment to include nutrition consult.      Body mass index is 16.01 kg/m².  Albumin   Date Value Ref Range Status   03/27/2025 2.0 (L) 3.5 - 5.2 g/dL Final   03/07/2025 2.9 (L) 3.4 - 5.0 g/dL Final   10/08/2021 3.7 3.5 - 5.2 g/dL Final     Age-related physical debility  Patient with Chronic debility due to age-related physical debility. The patient's latest AMPAC (Activity Measure for Post Acute Care) Score is listed below.    AM-PAC Score - How much help does the patient need for each activity listed  Basic Mobility Total Score: 10  Turning over in bed (including adjusting bedclothes, sheets and blankets)?: A lot  Sitting down on and standing up from a chair with arms (e.g., wheelchair, bedside commode, etc.): A lot  Moving from lying on back to sitting on the side of the bed?: A lot  Moving to and from a bed to a chair (including a wheelchair)?: A lot  Need to walk in hospital room?: Unable  Climbing 3-5 steps with a railing?: Unable    Plan  - PT/OT consulted  - Fall precautions in place    Hypervolemia      ESRD (end stage renal disease) on dialysis  -HD MWF; last dialyzed prior to  admission  -Nephrology consulted for HD   -renally dose medications  -renal diet  -avoid nephrotoxins  VTE Risk Mitigation (From admission, onward)           Ordered     Place sequential compression device  Until discontinued         03/27/25 1704     IP VTE HIGH RISK PATIENT  Once         03/27/25 1704     Reason for no Mechanical VTE Prophylaxis  Once        Question:  Reasons:  Answer:  Risk of Bleeding    03/27/25 1704                    Discharge Planning   МАРИНА: 4/7/2025     Code Status: Full Code   Medical Readiness for Discharge Date:   Discharge Plan A: Skilled Nursing Facility        Destiny Machado PA-C  Department of Hospital Medicine   [facility] - Internal Medicine Telemetry

## 2025-04-03 NOTE — SUBJECTIVE & OBJECTIVE
Interval History: MINO. Continues to have increased oxygen requirements, 92% on 4L with basline oxygen ~2L. Repeat CT chest reveals significant cardiomegaly, pleural effusions and volume overload.   Persistently worsening renal function on AM labs (3.0 >> 3.4 >> 3.8 >> 4.1). Per chart review patient had a TDC placed and started HD 03/04 - 03/05. Nephrology consulted today for HD and fluid management needs.     Review of Systems   Constitutional:  Negative for activity change, chills and fever.   Respiratory:  Positive for cough (chronic, productive). Negative for chest tightness, shortness of breath and wheezing.    Cardiovascular:  Negative for chest pain.   Gastrointestinal:  Negative for abdominal pain, blood in stool, constipation, diarrhea, nausea and vomiting.   Genitourinary:  Negative for difficulty urinating and dysuria.   Musculoskeletal:  Positive for gait problem. Negative for arthralgias and back pain.   Neurological:  Negative for dizziness, light-headedness and headaches.   Psychiatric/Behavioral:  Negative for agitation.      Objective:     Vital Signs (Most Recent):  Temp: 97.9 °F (36.6 °C) (04/03/25 1110)  Pulse: 63 (04/03/25 1506)  Resp: 18 (04/03/25 1227)  BP: (!) 115/49 (04/03/25 1110)  SpO2: (!) 86 % (04/03/25 1227) Vital Signs (24h Range):  Temp:  [97.5 °F (36.4 °C)-97.9 °F (36.6 °C)] 97.9 °F (36.6 °C)  Pulse:  [51-87] 63  Resp:  [18-20] 18  SpO2:  [79 %-96 %] 86 %  BP: ()/(32-50) 115/49     Weight: 45 kg (99 lb 3.3 oz)  Body mass index is 16.01 kg/m².    Intake/Output Summary (Last 24 hours) at 4/3/2025 1518  Last data filed at 4/3/2025 1437  Gross per 24 hour   Intake --   Output 500 ml   Net -500 ml         Physical Exam  Vitals and nursing note reviewed.   Constitutional:       General: He is not in acute distress.     Appearance: He is well-developed. He is cachectic. He is ill-appearing (chronically).   HENT:      Head: Normocephalic and atraumatic.   Eyes:      Extraocular  Movements: Extraocular movements intact.   Neck:      Vascular: JVD present.   Cardiovascular:      Rate and Rhythm: Normal rate and regular rhythm.      Heart sounds: Normal heart sounds.      Comments: Pitting edema to bilateral feet  Pulmonary:      Effort: Pulmonary effort is normal. No respiratory distress.      Breath sounds: Rales present.      Comments: Shallow inspiratory effort, bibasilar rales  Chest:      Comments: TDC in place to right chest wall  Abdominal:      General: There is no distension.   Musculoskeletal:         General: No tenderness. Normal range of motion.      Right lower le+ Pitting Edema present.      Left lower le+ Pitting Edema present.   Skin:     General: Skin is warm and dry.      Findings: No rash.   Neurological:      Mental Status: He is alert and oriented to person, place, and time.   Psychiatric:         Behavior: Behavior normal.         Thought Content: Thought content normal.         Judgment: Judgment normal.     Significant Labs: All pertinent labs within the past 24 hours have been reviewed.    Significant Imaging: I have reviewed all pertinent imaging results/findings within the past 24 hours.

## 2025-04-03 NOTE — ASSESSMENT & PLAN NOTE
Creatine stable for now. BMP reviewed- noted Estimated Creatinine Clearance: 9.9 mL/min (A) (based on SCr of 4.1 mg/dL (H)). according to latest data. Based on current GFR, CKD stage is stage 3 - GFR 30-59.  Monitor UOP and serial BMP and adjust therapy as needed. Renally dose meds. Avoid nephrotoxic medications and procedures.    ANGEL is likely due to pre-renal azotemia due to dehydration. Baseline creatinine is 2.8. Most recent creatinine and eGFR are listed below.  Recent Labs     04/01/25  0420 04/02/25  0417 04/03/25  0228   CREATININE 3.7* 3.8* 4.1*   EGFRNORACEVR 16* 16* 14*        Plan  - ANGEL is worsening. Will adjust treatment as follows: 500 ml NS bolus  - Avoid nephrotoxins and renally dose meds for GFR listed above  - Monitor urine output, serial BMP, and adjust therapy as needed

## 2025-04-03 NOTE — ASSESSMENT & PLAN NOTE
Pleural effusion  Hypervolemia   - 92% on 4L NC (baseline 1-2 L NC)  - CXR 03/31 with Persistent volume overload and pulmonary edema   - CT chest 04/02 with Findings of CHF and volume overload with cardiomegaly, moderate pericardial effusion, bilateral pleural effusions and superimposed pulmonary edema.   - given 40 mg IV lasix without improvement. Prior team suspected some dehydration component, given 500 mL fluid bolus without improvement  - 04/03; on chart review by me noted to have recently started HD every MWF. Had not been dialyzed since admission, nephrology consulted  - wean oxygen as tolerated   Patient found to have small pleural effusion on imaging. I have personally reviewed and interpreted the following imaging: Xray. A thoracentesis was deferred. Most likely etiology includes Congestive Heart Failure. Management to include Diuresis    Patient with Hypoxic Respiratory failure which is Acute on chronic.  he is on home oxygen at 1 LPM. Supplemental oxygen was provided and noted- Oxygen Concentration (%):  [4] 4 Contributing diagnoses includes - CHF and Pleural effusion Labs and images were reviewed. Patient Has not had a recent ABG. Will treat underlying causes and adjust management of respiratory failure as follows    - lasix 40 mg IV qd to resume following scope  - echo Mild global hypokinesis present. There is mildly reduced systolic function. Ejection fraction is approximately 45%. There is indeterminate diastolic function.

## 2025-04-03 NOTE — PROGRESS NOTES
Pharmacist Renal Dose Adjustment Note    Zelalem Gamez is a 74 y.o. male being treated with the medication ciprofloxacin.    Patient Data:    Vital Signs (Most Recent):  Temp: 97.5 °F (36.4 °C) (04/03/25 0800)  Pulse: 64 (04/03/25 0909)  Resp: 18 (04/03/25 0909)  BP: (!) 124/40 (04/03/25 0858)  SpO2: (!) 92 % (04/03/25 0909) Vital Signs (72h Range):  Temp:  [96.8 °F (36 °C)-98.5 °F (36.9 °C)]   Pulse:  [49-87]   Resp:  [13-20]   BP: ()/(32-73)   SpO2:  [71 %-96 %]      Recent Labs   Lab 04/01/25  0420 04/02/25  0417 04/03/25 0228   CREATININE 3.7* 3.8* 4.1*     Serum creatinine: 4.1 mg/dL (H) 04/03/25 0228  Estimated creatinine clearance: 9.9 mL/min (A)    Medication:ciprofloxacin dose: 500 mg frequency every 12 hrs will be changed to medication:ciprofloxacin dose:750 mg frequency:every 24 hrs    Pharmacist's Name: Ruddy Willard  Pharmacist's Extension: [phone]

## 2025-04-03 NOTE — ASSESSMENT & PLAN NOTE
Anemia is likely due to unknown suspect GI. Most recent hemoglobin and hematocrit are listed below.  Recent Labs     04/01/25  0420 04/02/25  0417 04/03/25  0228   HGB 9.5* 9.7* 8.7*   HCT 29.5* 30.3* 26.9*     Plan  - Monitor serial CBC: Every 8 hours  - Transfuse PRBC if patient becomes hemodynamically unstable, symptomatic or H/H drops below 7/21.  - Patient has received 1 units of PRBCs on 3/27  - Patient's anemia is currently stable  - CLD, NPO at mn  - IV Protonix 40mg BID  - IVF hydration   - 2 large bore IV access  - anti-emetics as needed  - GI consulted, appreciate recs   - S/p EGD today     Impression:    - Mild Schatzki ring.                          - Normal stomach.                          - Normal examined duodenum.                          - No specimens collected.   Recommendation:        - Return [facility] to [facility] for [facility] care.                          - Resume previous diet.                          - Continue present medications.                          - Perform outpatient colonoscopy at appointment to                          be scheduled pending patient's clinical course and                          willingness to prep.

## 2025-04-03 NOTE — PLAN OF CARE
Discharge Plan A and Plan B have been determined by review of patient's clinical status, future medical and therapeutic needs, and coverage/benefits for post-acute care in coordination with multidisciplinary team members.    04/03/25 0957   Post-Acute Status   Post-Acute Authorization Placement   Post-Acute Placement Status Pending post-acute provider review/more information requested   Coverage MEDICARE - MEDICARE PART A & B -   Hospital Resources/Appts/Education Provided Provided education on problems/symptoms using teachback   Discharge Plan   Discharge Plan A Skilled Nursing Facility   Discharge Plan B Home Health;Home with family     [facility] phoned [facility] [phone] for update on status of patient re-admit to SNF. SW spoke w/ admissions, re-admit pending review of clinicals.     10:30am  Per MD, patient no longer medically stable for dc to SNF; pending nephrology clearance.     2:00pm  [facility] confirmed that patient medically ready for dc today. [facility] sent SNF orders to facility; pending post-acute provider review.     3:40pm  [facility] confirmed w/ Kellie (admissions) that orders approved for admit today. [facility] placed [facility] orders for patient transport to SNF. Patient notified and agreeable to dc plan. Report to be called to [phone]. MD and bedside nurse notified.                       AAYUSH Tidwell, LMSW  [facility]  Case Management  Ext. [phone]

## 2025-04-03 NOTE — ASSESSMENT & PLAN NOTE
Constipation   - bowel regimen for stool evacuation and continue monitoring  - bowel regimen for stool evacuation and continue monitoring

## 2025-04-03 NOTE — ASSESSMENT & PLAN NOTE
Body mass index (BMI) less than 16.5   Concern for cachexia as evidenced by BMI less than 20 in the presence of known chronic disease. Treatment to include nutrition consult.      Body mass index is 16.01 kg/m².  Albumin   Date Value Ref Range Status   03/27/2025 2.0 (L) 3.5 - 5.2 g/dL Final   03/07/2025 2.9 (L) 3.4 - 5.0 g/dL Final   10/08/2021 3.7 3.5 - 5.2 g/dL Final     Concern for cachexia as evidenced by BMI less than 20 in the presence of known chronic disease. Treatment to include nutrition consult.      Body mass index is 16.01 kg/m².  Albumin   Date Value Ref Range Status   03/27/2025 2.0 (L) 3.5 - 5.2 g/dL Final   03/07/2025 2.9 (L) 3.4 - 5.0 g/dL Final   10/08/2021 3.7 3.5 - 5.2 g/dL Final

## 2025-04-03 NOTE — ASSESSMENT & PLAN NOTE
-HD MWF; last dialyzed prior to  admission  -Nephrology consulted for HD   -renally dose medications  -renal diet  -avoid nephrotoxins

## 2025-04-03 NOTE — SUBJECTIVE & OBJECTIVE
Past Medical History:   Diagnosis Date    Arthritis     Epilepsy     Hyperglycemia     Stroke        Past Surgical History:   Procedure Laterality Date    CHOLECYSTECTOMY      ESOPHAGOGASTRODUODENOSCOPY N/A 3/31/2025    Procedure: EGD (ESOPHAGOGASTRODUODENOSCOPY);  Surgeon: Jose Meyer MD;  Location: [address]);  Service: Endoscopy;  Laterality: N/A;    JOINT REPLACEMENT      SKIN CANCER EXCISION         Review of patient's allergies indicates:  No Known Allergies  Current Facility-Administered Medications   Medication Frequency    0.9%  NaCl infusion (for blood administration) Q24H PRN    acetaminophen tablet 650 mg Q8H PRN    albuterol sulfate nebulizer solution 2.5 mg Q4H WAKE    amiodarone tablet 200 mg Daily    amLODIPine tablet 10 mg Daily    ARIPiprazole tablet 2 mg Daily    ascorbic acid (vitamin C) tablet 250 mg Daily    ciprofloxacin HCl tablet 750 mg Q24H    dextrose 50% injection 12.5 g PRN    dextrose 50% injection 25 g PRN    ferrous sulfate tablet 1 each Daily    furosemide injection 20 mg Once    furosemide injection 40 mg BID    glucagon (human recombinant) injection 1 mg PRN    glucose chewable tablet 16 g PRN    glucose chewable tablet 24 g PRN    hydrALAZINE tablet 50 mg Q8H    melatonin tablet 6 mg Nightly PRN    metoprolol succinate (TOPROL-XL) 24 hr tablet 25 mg Daily    naloxone 0.4 mg/mL injection 0.02 mg PRN    ondansetron disintegrating tablet 8 mg Q8H PRN    pantoprazole EC tablet 40 mg BID AC    PHENobarbitaL tablet 32.4 mg Daily    polyethylene glycol packet 17 g Daily    prochlorperazine injection Soln 5 mg Q6H PRN    senna tablet 8.6 mg Daily    sevelamer carbonate tablet 800 mg TID WM    trazodone split tablet 25 mg Nightly PRN    vitamin D 1000 units tablet 2,000 Units Daily    zonisamide capsule 100 mg Daily     Family History       Problem Relation (Age of Onset)    Diabetes Mother          Tobacco Use    Smoking status: Every Day     Current packs/day: 1.00      Average packs/day: 1 pack/day for 45.0 years (45.0 ttl pk-yrs)     Types: Cigarettes    Smokeless tobacco: Never   Substance and Sexual Activity    Alcohol use: No    Drug use: No    Sexual activity: Not on file     Review of Systems  Objective:     Vital Signs (Most Recent):  Temp: 97.9 °F (36.6 °C) (25 1110)  Pulse: 63 (25 1506)  Resp: 18 (25 1227)  BP: (!) 118/50 (25 1400)  SpO2: (!) 86 % (25 1227) Vital Signs (24h Range):  Temp:  [97.5 °F (36.4 °C)-97.9 °F (36.6 °C)] 97.9 °F (36.6 °C)  Pulse:  [51-87] 63  Resp:  [18-20] 18  SpO2:  [79 %-96 %] 86 %  BP: ()/(32-50) 118/50     Weight: 45 kg (99 lb 3.3 oz) (25 0642)  Body mass index is 16.01 kg/m².  Body surface area is 1.45 meters squared.    I/O last 3 completed shifts:  In: 480 [P.O.:480]  Out: 550 [Urine:550]     Physical Exam  Vitals and nursing note reviewed.   Constitutional:       General: He is not in acute distress.     Appearance: He is well-developed. He is cachectic. He is ill-appearing (chronically).   HENT:      Head: Normocephalic and atraumatic.   Eyes:      Extraocular Movements: Extraocular movements intact.   Neck:      Vascular: JVD present.   Cardiovascular:      Rate and Rhythm: Normal rate and regular rhythm.      Heart sounds: Normal heart sounds.   Pulmonary:      Effort: No respiratory distress.      Breath sounds: Rales present.   Chest:      Comments: TDC in place to right chest wall  Abdominal:      General: There is no distension.   Musculoskeletal:         General: No tenderness. Normal range of motion.      Right lower le+ Pitting Edema present.      Left lower le+ Pitting Edema present.   Skin:     General: Skin is warm and dry.      Findings: No rash.   Neurological:      Mental Status: He is alert and oriented to person, place, and time.   Psychiatric:         Behavior: Behavior normal.         Thought Content: Thought content normal.         Judgment: Judgment normal.           Significant Labs:  All labs within the past 24 hours have been reviewed.    Significant Imaging:  Labs: Reviewed

## 2025-04-03 NOTE — ASSESSMENT & PLAN NOTE
Patient has paroxysmal (<7 days) atrial fibrillation. Patient is currently in sinus rhythm. [id number] Score: 2. The patients heart rate in the last 24 hours is as follows:  Pulse  Min: 51  Max: 87     Antiarrhythmics  , Daily, Oral  , Daily, Oral  amiodarone tablet 200 mg, Daily, Oral  metoprolol succinate (TOPROL-XL) 24 hr tablet 25 mg, Daily, Oral    Anticoagulants    Not initiated due to anemia     Plan  - Replete lytes with a goal of K>4, Mg >2  - Patient is not anticoagulated due to risk of bleeding  - Patient's afib is currently controlled

## 2025-04-04 VITALS
TEMPERATURE: 99 F | HEIGHT: 66 IN | WEIGHT: 99.19 LBS | SYSTOLIC BLOOD PRESSURE: 135 MMHG | BODY MASS INDEX: 15.94 KG/M2 | RESPIRATION RATE: 18 BRPM | HEART RATE: 76 BPM | OXYGEN SATURATION: 95 % | DIASTOLIC BLOOD PRESSURE: 52 MMHG

## 2025-04-04 PROBLEM — R93.89 ABNORMAL CT SCAN: Status: ACTIVE | Noted: 2025-04-04

## 2025-04-04 LAB
ABSOLUTE EOSINOPHIL (OHS): 0 K/UL
ABSOLUTE MONOCYTE (OHS): 0.57 K/UL (ref 0.3–1)
ABSOLUTE NEUTROPHIL COUNT (OHS): 11.38 K/UL (ref 1.8–7.7)
ANION GAP (OHS): 9 MMOL/L (ref 8–16)
BASOPHILS # BLD AUTO: 0.01 K/UL
BASOPHILS NFR BLD AUTO: 0.1 %
BUN SERPL-MCNC: 18 MG/DL (ref 8–23)
C3 SERPL-MCNC: 102 MG/DL (ref 50–180)
C4 COMPLEMENT (OHS): 27 MG/DL (ref 11–44)
CALCIUM SERPL-MCNC: 8.1 MG/DL (ref 8.7–10.5)
CHLORIDE SERPL-SCNC: 103 MMOL/L (ref 95–110)
CO2 SERPL-SCNC: 23 MMOL/L (ref 23–29)
CREAT SERPL-MCNC: 2.2 MG/DL (ref 0.5–1.4)
ERYTHROCYTE [DISTWIDTH] IN BLOOD BY AUTOMATED COUNT: 21.6 % (ref 11.5–14.5)
GFR SERPLBLD CREATININE-BSD FMLA CKD-EPI: 30 ML/MIN/1.73/M2
GLUCOSE SERPL-MCNC: 93 MG/DL (ref 70–110)
HCT VFR BLD AUTO: 26.9 % (ref 40–54)
HGB BLD-MCNC: 8.5 GM/DL (ref 14–18)
IMM GRANULOCYTES # BLD AUTO: 0.08 K/UL (ref 0–0.04)
IMM GRANULOCYTES NFR BLD AUTO: 0.6 % (ref 0–0.5)
LYMPHOCYTES # BLD AUTO: 0.64 K/UL (ref 1–4.8)
MAGNESIUM SERPL-MCNC: 2.3 MG/DL (ref 1.6–2.6)
MCH RBC QN AUTO: 30.9 PG (ref 27–31)
MCHC RBC AUTO-ENTMCNC: 31.6 G/DL (ref 32–36)
MCV RBC AUTO: 98 FL (ref 82–98)
NUCLEATED RBC (/100WBC) (OHS): 0 /100 WBC
PHOSPHATE SERPL-MCNC: 2.6 MG/DL (ref 2.7–4.5)
PLATELET # BLD AUTO: 265 K/UL (ref 150–450)
PMV BLD AUTO: 10.7 FL (ref 9.2–12.9)
POTASSIUM SERPL-SCNC: 3.7 MMOL/L (ref 3.5–5.1)
RBC # BLD AUTO: 2.75 M/UL (ref 4.6–6.2)
RELATIVE EOSINOPHIL (OHS): 0 %
RELATIVE LYMPHOCYTE (OHS): 5 % (ref 18–48)
RELATIVE MONOCYTE (OHS): 4.5 % (ref 4–15)
RELATIVE NEUTROPHIL (OHS): 89.8 % (ref 38–73)
SODIUM SERPL-SCNC: 135 MMOL/L (ref 136–145)
WBC # BLD AUTO: 12.68 K/UL (ref 3.9–12.7)

## 2025-04-04 PROCEDURE — 86160 COMPLEMENT ANTIGEN: CPT | Performed by: INTERNAL MEDICINE

## 2025-04-04 PROCEDURE — 83521 IG LIGHT CHAINS FREE EACH: CPT | Performed by: INTERNAL MEDICINE

## 2025-04-04 PROCEDURE — 82310 ASSAY OF CALCIUM: CPT | Performed by: PHYSICIAN ASSISTANT

## 2025-04-04 PROCEDURE — 99900035 HC TECH TIME PER 15 MIN (STAT)

## 2025-04-04 PROCEDURE — 83520 IMMUNOASSAY QUANT NOS NONAB: CPT | Performed by: INTERNAL MEDICINE

## 2025-04-04 PROCEDURE — 83735 ASSAY OF MAGNESIUM: CPT

## 2025-04-04 PROCEDURE — 94761 N-INVAS EAR/PLS OXIMETRY MLT: CPT

## 2025-04-04 PROCEDURE — 86334 IMMUNOFIX E-PHORESIS SERUM: CPT | Performed by: INTERNAL MEDICINE

## 2025-04-04 PROCEDURE — 36415 COLL VENOUS BLD VENIPUNCTURE: CPT | Performed by: INTERNAL MEDICINE

## 2025-04-04 PROCEDURE — 25000003 PHARM REV CODE 250

## 2025-04-04 PROCEDURE — 94640 AIRWAY INHALATION TREATMENT: CPT

## 2025-04-04 PROCEDURE — 25000242 PHARM REV CODE 250 ALT 637 W/ HCPCS: Performed by: PHYSICIAN ASSISTANT

## 2025-04-04 PROCEDURE — 84165 PROTEIN E-PHORESIS SERUM: CPT | Performed by: INTERNAL MEDICINE

## 2025-04-04 PROCEDURE — 86038 ANTINUCLEAR ANTIBODIES: CPT | Performed by: INTERNAL MEDICINE

## 2025-04-04 PROCEDURE — 85025 COMPLETE CBC W/AUTO DIFF WBC: CPT | Performed by: PHYSICIAN ASSISTANT

## 2025-04-04 PROCEDURE — 25000003 PHARM REV CODE 250: Performed by: PHYSICIAN ASSISTANT

## 2025-04-04 PROCEDURE — 84100 ASSAY OF PHOSPHORUS: CPT

## 2025-04-04 PROCEDURE — 36415 COLL VENOUS BLD VENIPUNCTURE: CPT | Performed by: PHYSICIAN ASSISTANT

## 2025-04-04 PROCEDURE — 25000003 PHARM REV CODE 250: Performed by: STUDENT IN AN ORGANIZED HEALTH CARE EDUCATION/TRAINING PROGRAM

## 2025-04-04 PROCEDURE — 86255 FLUORESCENT ANTIBODY SCREEN: CPT | Performed by: INTERNAL MEDICINE

## 2025-04-04 PROCEDURE — 84165 PROTEIN E-PHORESIS SERUM: CPT | Mod: ,,, | Performed by: PATHOLOGY

## 2025-04-04 PROCEDURE — 27000221 HC OXYGEN, UP TO 24 HOURS

## 2025-04-04 PROCEDURE — 99231 SBSQ HOSP IP/OBS SF/LOW 25: CPT | Mod: GC,,, | Performed by: INTERNAL MEDICINE

## 2025-04-04 RX ORDER — TIZANIDINE HYDROCHLORIDE 4 MG/1
1 CAPSULE, GELATIN COATED ORAL 2 TIMES DAILY PRN
Start: 2025-04-04

## 2025-04-04 RX ORDER — NAPROXEN SODIUM 220 MG/1
1 TABLET, FILM COATED ORAL DAILY
COMMUNITY
Start: 2025-03-08 | End: 2026-03-08

## 2025-04-04 RX ORDER — TAMSULOSIN HYDROCHLORIDE 0.4 MG/1
1 CAPSULE ORAL NIGHTLY
COMMUNITY
Start: 2024-08-16

## 2025-04-04 RX ORDER — AMLODIPINE BESYLATE 10 MG/1
10 TABLET ORAL EVERY MORNING
COMMUNITY
Start: 2025-01-08

## 2025-04-04 RX ORDER — TORSEMIDE 10 MG/1
50 TABLET ORAL 2 TIMES DAILY
Start: 2025-04-04 | End: 2026-04-04

## 2025-04-04 RX ORDER — PHENOBARBITAL 16.2 MG/1
32 TABLET ORAL DAILY
Qty: 60 TABLET | Refills: 0 | Status: SHIPPED | OUTPATIENT
Start: 2025-04-04 | End: 2025-05-04

## 2025-04-04 RX ORDER — PHENOBARBITAL 16.2 MG/1
32 TABLET ORAL DAILY
Qty: 2 TABLET | Refills: 0 | Status: SHIPPED | OUTPATIENT
Start: 2025-04-04 | End: 2025-04-04

## 2025-04-04 RX ORDER — TIZANIDINE HYDROCHLORIDE 4 MG/1
1 CAPSULE, GELATIN COATED ORAL 2 TIMES DAILY
Status: ON HOLD | COMMUNITY
Start: 2025-01-09 | End: 2025-04-04

## 2025-04-04 RX ORDER — ZONISAMIDE 100 MG/1
100 CAPSULE ORAL DAILY
Start: 2025-04-04

## 2025-04-04 RX ORDER — CIPROFLOXACIN 500 MG/1
500 TABLET ORAL DAILY
Qty: 3 TABLET | Refills: 0 | Status: SHIPPED | OUTPATIENT
Start: 2025-04-04 | End: 2025-04-07

## 2025-04-04 RX ADMIN — SEVELAMER CARBONATE 800 MG: 800 TABLET, FILM COATED ORAL at 09:04

## 2025-04-04 RX ADMIN — FERROUS SULFATE TAB 325 MG (65 MG ELEMENTAL FE) 1 EACH: 325 (65 FE) TAB at 09:04

## 2025-04-04 RX ADMIN — HYDRALAZINE HYDROCHLORIDE 50 MG: 50 TABLET ORAL at 04:04

## 2025-04-04 RX ADMIN — ARIPIPRAZOLE 2 MG: 2 TABLET ORAL at 09:04

## 2025-04-04 RX ADMIN — SEVELAMER CARBONATE 800 MG: 800 TABLET, FILM COATED ORAL at 12:04

## 2025-04-04 RX ADMIN — PHENOBARBITAL 32.4 MG: 32.4 TABLET ORAL at 09:04

## 2025-04-04 RX ADMIN — ALBUTEROL SULFATE 2.5 MG: 2.5 SOLUTION RESPIRATORY (INHALATION) at 08:04

## 2025-04-04 RX ADMIN — AMIODARONE HYDROCHLORIDE 200 MG: 200 TABLET ORAL at 09:04

## 2025-04-04 RX ADMIN — TORSEMIDE 50 MG: 20 TABLET ORAL at 04:04

## 2025-04-04 RX ADMIN — PANTOPRAZOLE SODIUM 40 MG: 40 TABLET, DELAYED RELEASE ORAL at 04:04

## 2025-04-04 RX ADMIN — CIPROFLOXACIN HYDROCHLORIDE 750 MG: 750 TABLET, FILM COATED ORAL at 09:04

## 2025-04-04 RX ADMIN — ZONISAMIDE 100 MG: 100 CAPSULE ORAL at 09:04

## 2025-04-04 RX ADMIN — METOPROLOL SUCCINATE 25 MG: 25 TABLET, EXTENDED RELEASE ORAL at 09:04

## 2025-04-04 RX ADMIN — TORSEMIDE 50 MG: 20 TABLET ORAL at 09:04

## 2025-04-04 RX ADMIN — AMLODIPINE BESYLATE 10 MG: 10 TABLET ORAL at 09:04

## 2025-04-04 RX ADMIN — ALBUTEROL SULFATE 2.5 MG: 2.5 SOLUTION RESPIRATORY (INHALATION) at 04:04

## 2025-04-04 RX ADMIN — SEVELAMER CARBONATE 800 MG: 800 TABLET, FILM COATED ORAL at 04:04

## 2025-04-04 RX ADMIN — Medication 250 MG: at 09:04

## 2025-04-04 RX ADMIN — Medication 2000 UNITS: at 09:04

## 2025-04-04 RX ADMIN — ALBUTEROL SULFATE 2.5 MG: 2.5 SOLUTION RESPIRATORY (INHALATION) at 12:04

## 2025-04-04 NOTE — ASSESSMENT & PLAN NOTE
This is a dialysis dependent ANGEL (due to rhabdomyolysis) patient who is dialyzed MWF. He has received no dialysis sessions during this hospitalization. Nephrology is being made aware of his dialysis needs at the time of consult as there is concern over his worsening respiratory status. Chest XR and physical exam point to fluid overload and on interview with the patient his oxygen requirements are greater than normal despite at attempts at diuresis this hospitalization. Note: he was showing signs of overt proteinuria about 1 year ago.      Repeat UPCR with nephrotic range proteinuria (~9 this admission). Hep B and C and HIV negative.      Dialysis dependent ANGEL  CKD - though likely ESRD now   Past Rhabdomyolysis ANGEL  Proteinuria - nephrotic range     Recommendations:  No dialysis today   Ordered SPEP, free light chains, PLA2R, C3 and 4, SILVER   Continue torsemide 50mg BID

## 2025-04-04 NOTE — PLAN OF CARE
NURSING HOME ORDERS    04/04/2025  [facility]  [facility]  [address]  [facility] [address]  Dept: [phone]  Loc: [phone]     Admit to Nursing Home:  Skilled    Diagnoses:  Active Hospital Problems    Diagnosis  POA    *Acute on chronic respiratory failure with hypoxemia [J96.21]  Yes    ESRD (end stage renal disease) on dialysis [N18.6, Z99.2]  Not Applicable     Priority: 2     Abnormal CT scan [R93.89]  Yes    Hypervolemia [E87.70]  Yes    Dialysis patient [Z99.2]  Not Applicable    Cachexia [R64]  Yes    Body mass index (BMI) less than 16.5 [Z68.1]  Not Applicable    Age-related physical debility [R54]  Yes    Tobacco dependency [F17.200]  Yes    Constipation [K59.00]  Yes    Stercoral colitis [K52.89]  Yes    Generalized weakness [R53.1]  Yes    Symptomatic anemia [D64.9]  Yes    Atrial fibrillation [I48.91]  Yes    Pleural effusion [J90]  Yes    Severe protein-calorie malnutrition [E43]  Yes        Recommendations      1. Continue regular diet with Boost Plus TID.     Goals: 1. Pt's intake meals and supplements to meet % estimated energy requirements by RD follow upl.  Nutrition Goal Status: new  Communication of RD Recs:  (POC)     Assessment and Plan     Nutrition Problem:  Severe Protein-Calorie Malnutrition  Malnutrition in the context of Social Circumstances     Related to (etiology):  Food insecurity     Signs and Symptoms (as evidenced by):  Energy Intake: <75% of estimated energy requirement for at least 6 months  Body Fat Depletion: moderate to severe depletion of orbitals, triceps, thoracic region   Muscle Mass Depletion: moderate to severe depletion of clavicles, temples, shoulders, scapular region, hands, legs      Interventions(treatment strategy):  Collaboration of care with providers.  General healthy diet.  Commercial beverage: Boost Plus TID     Nutrition Diagnosis Status:  New]      Seizure disorder [G40.909]  Yes       Resolved Hospital Problems   No resolved problems to display.       Patient is homebound due to:  Acute on chronic respiratory failure with hypoxemia    Allergies:Review of patient's allergies indicates:  No Known Allergies    Vitals:  Routine    Diet: renal diet    Activities:   Activity as tolerated    Goals of Care Treatment Preferences:  Code Status: Full Code      Labs:  per facility    Nursing Precautions:  Fall and Pressure ulcer prevention    Consults:   PT to evaluate and treat- 5 times a week and OT to evaluate and treat- 5 times a week     Miscellaneous Care: Routine Skin for Bedridden Patients:  Apply moisture barrier cream to all     Miscellaneous   - Routine Skin for Bedridden Patients: Instruct patient/caregiver to apply moisture barrier cream to all skin folds and wet areas in perineal area daily and after baths and all bowel movements.  - Home Oxygen:  Oxygen at 3 L/min nasal canula to be used:  Continuously., Assess oxygen saturation via pulse oximeter as needed for increase in SOB., and Notify physician if oxygen saturation less than 88%    Hemodialysis per nephrology             Medications: Discontinue all previous medication orders, if any. See new list below.     Medication List        PAUSE taking these medications      gabapentin 100 MG capsule  Wait to take this until your doctor or other care provider tells you to start again.  Commonly known as: NEURONTIN  Take 100 mg by mouth 2 (two) times daily.            START taking these medications      ciprofloxacin HCl 500 MG tablet  Commonly known as: CIPRO  Take 1 tablet (500 mg total) by mouth once daily. for 3 days  END DATE 04/07/2025   torsemide 10 MG Tab  Commonly known as: DEMADEX  Take 5 tablets (50 mg total) by mouth 2 (two) times a day.            CHANGE how you take these medications      tiZANidine 4 mg Cap  Take 1 capsule by mouth 2 (two) times daily as needed.  What changed:   when to take this  reasons to take this             CONTINUE taking these medications      acetaminophen 500 MG tablet  Commonly known as: TYLENOL  Take 1 tablet (500 mg total) by mouth every 6 (six) hours as needed for Pain.     amiodarone 200 MG Tab  Commonly known as: PACERONE  Take 200 mg by mouth once daily.     amLODIPine 10 MG tablet  Commonly known as: NORVASC  Take 10 mg by mouth every morning.     ARIPiprazole 2 MG Tab  Commonly known as: ABILIFY  Take 2 mg by mouth once daily.     ascorbic acid (vitamin C) 250 MG tablet  Commonly known as: VITAMIN C  Take 1 tablet (250 mg total) by mouth once daily.     aspirin 81 MG Chew  Take 1 tablet by mouth once daily.     ferrous sulfate Tab tablet  Commonly known as: FEOSOL  Take 1 tablet (1 each total) by mouth once daily.     metoprolol succinate 25 MG 24 hr tablet  Commonly known as: TOPROL-XL  Take 25 mg by mouth once daily.     PHENobarbitaL 16.2 MG tablet  Take 32 mg by mouth once daily.     sevelamer carbonate 800 mg Tab  Commonly known as: RENVELA  Take 1 tablet (800 mg total) by mouth 3 (three) times daily with meals.     tamsulosin 0.4 mg Cap  Commonly known as: FLOMAX  Take 1 capsule by mouth every evening.     vitamin D 1000 units Tab  Commonly known as: VITAMIN D3  Take 2 tablets (2,000 Units total) by mouth once daily.     zonisamide 100 MG Cap  Commonly known as: ZONEGRAN  Take 100 mg by mouth once daily            STOP taking these medications      zinc sulfate 50 mg zinc (220 mg) capsule  Commonly known as: ZINCATE                Immunizations Administered as of 4/4/2025       Name Date Dose VIS Date Route Exp Date    COVID-19, MRNA, LN-S, PF (Pfizer) (Purple Cap) 4/28/2021 0.3 mL 5/10/2021 Intramuscular --    Site: Left arm     : Pf[employer] Inc     Lot: [id number]     External: Auto Reconciled From Outside Source     Comment: Adminis     COVID-19, MRNA, LN-DYANA RICHARD (Pfizer) (Purple Cap) 3/31/2021 0.3 mL 10/6/2009 Intramuscular --    Site: Left arm     : Pf[employer] Inc     Lot:  [id number]     External: Auto Reconciled From Outside Source     Comment: Adminis             This patient has had both covid vaccinations    Some patients may experience side effects after vaccination.  These may include fever, headache, muscle or joint aches.  Most symptoms resolve with 24-48 hours and do not require urgent medical evaluation unless they persist for more than 72 hours or symptoms are concerning for an unrelated medical condition.          _________________________________  Destiny Machado PA-C  04/04/2025

## 2025-04-04 NOTE — PLAN OF CARE
Problem: Adult Inpatient Plan of Care  Goal: Plan of Care Review  Outcome: Progressing  Goal: Patient-Specific Goal (Individualized)  Outcome: Progressing  Goal: Absence of Hospital-Acquired Illness or Injury  Outcome: Progressing  Goal: Optimal Comfort and Wellbeing  Outcome: Progressing  Goal: Readiness for Transition of Care  Outcome: Progressing     Problem: Gastrointestinal Bleeding  Goal: Optimal Coping with Acute Illness  Outcome: Progressing  Goal: Hemostasis  Outcome: Progressing     Problem: Infection  Goal: Absence of Infection Signs and Symptoms  Outcome: Progressing     Problem: Acute Kidney Injury/Impairment  Goal: Fluid and Electrolyte Balance  Outcome: Progressing  Goal: Improved Oral Intake  Outcome: Progressing  Goal: Effective Renal Function  Outcome: Progressing     Problem: Wound  Goal: Optimal Coping  Outcome: Progressing  Goal: Optimal Functional Ability  Outcome: Progressing  Goal: Absence of Infection Signs and Symptoms  Outcome: Progressing  Goal: Improved Oral Intake  Outcome: Progressing  Goal: Optimal Pain Control and Function  Outcome: Progressing  Goal: Skin Health and Integrity  Outcome: Progressing  Goal: Optimal Wound Healing  Outcome: Progressing     Problem: Skin Injury Risk Increased  Goal: Skin Health and Integrity  Outcome: Progressing     Problem: Fall Injury Risk  Goal: Absence of Fall and Fall-Related Injury  Outcome: Progressing     Problem: Hemodialysis  Goal: Safe, Effective Therapy Delivery  Outcome: Progressing  Goal: Effective Tissue Perfusion  Outcome: Progressing  Goal: Absence of Infection Signs and Symptoms  Outcome: Progressing

## 2025-04-04 NOTE — PROGRESS NOTES
[facility]  Nephrology  Progress Note    Patient Name: Zelalem Gamez  MRN: [id number]  Admission Date: 3/27/2025  Hospital Length of Stay: 7 days  Attending Provider: Dolores Espinal MD   Primary Care Physician: Deysi Pink MD  Principal Problem:Acute on chronic respiratory failure with hypoxemia    Subjective:     HPI: 74 year old male with a pMHx of FrEF (EF 35%), hypertension, peripheral arterial disease, chronic kidney disease on hemodialysis (MWF), seizure disorder who presentsafter routine labs revealed hemoglobin of 6.6. He is currently residing at a SNF after being discharged from the hospital on 03/07. Patient was admitted from 2/21-03/07 after a syncopal event. He was found to have several issues going on, including acute blood loss anemia requiring 2 units PRBCs. At this time, he also had an ANGEL on CKD along with rhabdomyolysis, so a tunneled catheter was placed and he was started on dialysis. He has been going to dialysis regularly. He denies headache, fever, chills, hemoptysis, decreased appetite, chest pain, abdominal pain, nausea, vomiting, dysuria, hematuria, or lower extremity pain/swelling.      In ED, Pt AFVSS on 1L NC. Hgb 6.6>6.8. No leukocytosis. K 3.4. Cr 3, previously 2.8. Trop 19. CXR: Findings suggestive of pulmonary edema with bilateral pleural effusions, left greater than right. Admitted to  for c/f GIB.     On chart review he is a dialysis dependent ANGEL patient (MWF). Nephrology is being consulted for dialysis in the setting of volume overload.     Interval History: Underwent dialysis yesterday night/thisAM. UPCR resulted at 9.     Review of patient's allergies indicates:  No Known Allergies  Current Facility-Administered Medications   Medication Frequency    0.9%  NaCl infusion (for blood administration) Q24H PRN    acetaminophen tablet 650 mg Q8H PRN    albuterol sulfate nebulizer solution 2.5 mg Q4H WAKE    amiodarone tablet 200 mg Daily    amLODIPine  tablet 10 mg Daily    ARIPiprazole tablet 2 mg Daily    ascorbic acid (vitamin C) tablet 250 mg Daily    ciprofloxacin HCl tablet 750 mg Q24H    dextrose 50% injection 12.5 g PRN    dextrose 50% injection 25 g PRN    ferrous sulfate tablet 1 each Daily    glucagon (human recombinant) injection 1 mg PRN    glucose chewable tablet 16 g PRN    glucose chewable tablet 24 g PRN    hydrALAZINE tablet 50 mg Q8H    melatonin tablet 6 mg Nightly PRN    metoprolol succinate (TOPROL-XL) 24 hr tablet 25 mg Daily    naloxone 0.4 mg/mL injection 0.02 mg PRN    ondansetron disintegrating tablet 8 mg Q8H PRN    pantoprazole EC tablet 40 mg BID AC    PHENobarbitaL tablet 32.4 mg Daily    polyethylene glycol packet 17 g Daily    prochlorperazine injection Soln 5 mg Q6H PRN    senna tablet 8.6 mg Daily    sevelamer carbonate tablet 800 mg TID WM    torsemide tablet 50 mg BID loop    trazodone split tablet 25 mg Nightly PRN    vitamin D 1000 units tablet 2,000 Units Daily    zonisamide capsule 100 mg Daily       Objective:     Vital Signs (Most Recent):  Temp: 98.4 °F (36.9 °C) (04/04/25 1111)  Pulse: 71 (04/04/25 1221)  Resp: 18 (04/04/25 1221)  BP: (!) 139/55 (04/04/25 1111)  SpO2: (!) 94 % (04/04/25 1221) Vital Signs (24h Range):  Temp:  [97.6 °F (36.4 °C)-98.6 °F (37 °C)] 98.4 °F (36.9 °C)  Pulse:  [] 71  Resp:  [18-21] 18  SpO2:  [80 %-96 %] 94 %  BP: (105-148)/(39-60) 139/55     Weight: 45 kg (99 lb 3.3 oz) (04/02/25 0642)  Body mass index is 16.01 kg/m².  Body surface area is 1.45 meters squared.    I/O last 3 completed shifts:  In: -   Out: 3050 [Urine:850; Other:2200]     Physical Exam  Vitals and nursing note reviewed.   Constitutional:       General: He is not in acute distress.     Appearance: He is well-developed. He is cachectic. He is ill-appearing (chronically).   HENT:      Head: Normocephalic and atraumatic.   Eyes:      Extraocular Movements: Extraocular movements intact.   Cardiovascular:      Rate and Rhythm:  Normal rate and regular rhythm.      Heart sounds: Normal heart sounds.   Pulmonary:      Effort: No respiratory distress.   Chest:      Comments: TDC in place to right chest wall  Abdominal:      General: There is no distension.   Musculoskeletal:         General: No tenderness. Normal range of motion.   Skin:     General: Skin is warm and dry.      Findings: No rash.   Neurological:      Mental Status: He is alert and oriented to person, place, and time.   Psychiatric:         Behavior: Behavior normal.         Thought Content: Thought content normal.         Judgment: Judgment normal.          Significant Labs:  All labs within the past 24 hours have been reviewed.     Significant Imaging:  Labs: Reviewed  Assessment/Plan:     Renal/  Dialysis patient  This is a dialysis dependent ANGEL (due to rhabdomyolysis) patient who is dialyzed MWF. He has received no dialysis sessions during this hospitalization. Nephrology is being made aware of his dialysis needs at the time of consult as there is concern over his worsening respiratory status. Chest XR and physical exam point to fluid overload and on interview with the patient his oxygen requirements are greater than normal despite at attempts at diuresis this hospitalization. Note: he was showing signs of overt proteinuria about 1 year ago.      Repeat UPCR with nephrotic range proteinuria (~9 this admission). Hep B and C and HIV negative.      Dialysis dependent ANGEL  CKD - though likely ESRD now   Past Rhabdomyolysis ANGEL  Proteinuria - nephrotic range     Recommendations:  No dialysis today   Ordered SPEP, free light chains, PLA2R, C3 and 4, SILVER   Continue torsemide 50mg BID         Thank you for your consult. I will follow-up with patient. Please contact us if you have any additional questions.    Goldy Aragon, DO  Nephrology  [facility]    ATTENDING PHYSICIAN ATTESTATION  I have personally verified the history and examined the patient.  I thoroughly reviewed the demographic, clinical, laboratorial and imaging information available in medical records. I agree with the assessment and recommendations provided by the subspecialty resident who was under my supervision.

## 2025-04-04 NOTE — PROGRESS NOTES
04/03/25 2054   Required for all Hemodialysis Patients   Hepatitis Status negative   Handoff Report   Received From Mervat KAYE   Given To Yoselin KAYE   Treatment Type   Treatment Type Maintenance   Vital Signs   Temp 98 °F (36.7 °C)   Temp Source Oral   Pulse 68   Heart Rate Source Monitor   Resp 20   SpO2 (!) 90 %   Pulse Oximetry Type Intermittent   Probe Placed On (Pulse Ox) Left:;finger   Flow (L/min) (Oxygen Therapy) 44   Oxygen Concentration (%) 36   Device (Oxygen Therapy) nasal cannula   BP (!) 131/44   MAP (mmHg) 77   BP Location Left arm   BP Method Automatic   Patient Position Sitting   Assessments (Pre/Post)   Consent Obtained yes   Safety other (see comments)   Date Hepatitis Profile Obtained 04/03/25   Level of Consciousness (AVPU) alert   Pain   Preferred Pain Scale number (Numeric Rating Pain Scale)   Pain Rating (0-10): Rest 0   Pre-Hemodialysis Assessment   Patient Status Arrived   Treatment Status Started   Gross Bleach Negative Yes   Machine Number K44   Alarms Verified Yes   pH 7   Machine Temperature 97.7 °F (36.5 °C)   Dialyzer F-160   Machine Conductivity 14   Meter Conductivity 14   Dialysate Na (mEq/L) 138   Dialysate K (mEq/L) 2   Dialysate CA (mEq/L) 3   Dialysate HCO3 (mEq/L) 30   Prime Ordered (mL) 250 mL   Net UF Goal 2000   Total UF Goal 2500   Pre-Hemodialysis Comments arrived to pt's bs. report rcvd. POC rev w pt; pt verbalized understanding. MD orders rev and verified. machine settings per MD orders.   RO Rejection Within Limits? Yes        Hemodialysis Catheter right subclavian   No placement date or time found.   Present Prior to Hospital Arrival?: Yes  Location: right subclavian   Line Necessity Review CRRT/HD   Site Assessment No drainage;No redness;No swelling;No warmth   Dressing Type CHG impregnated dressing/sponge   Dressing Status Clean;Dry;Intact   Dressing Intervention Sterile dressing change   Date on Dressing 04/03/25   Dressing Due to be Changed 04/10/25   Venous  Patency/Care accessed;blood return present;flushed w/o difficulty   Arterial Patency/Care accessed;blood return present;flushed w/o difficulty   During Hemodialysis Assessment   Blood Flow Rate (mL/min) 400 mL/min   Dialysate Flow Rate (mL/min) 800 ml/min   Ultrafiltration Rate (mL/Hr) 830 mL/Hr   Arteriovenous Lines Secure Yes   Arterial Pressure (mmHg) -150 mmHg   Venous Pressure (mmHg) 90   TMP 50   Venous Line in Air Detector Yes   Intake (mL) 250 mL   Intra-Hemodialysis Comments CVC cleasned and allowed to dry. A and V ports: aspirated 10 cc blood and discarded w ease. flushed w 10 cc NS w/o diff. machine connected, lines secured, alarms engaged tx initaited.

## 2025-04-04 NOTE — PROGRESS NOTES
04/03/25 2355   Vital Signs   Temp 98 °F (36.7 °C)   Temp Source Oral   Pulse 71   Heart Rate Source Monitor   Resp 18   SpO2 (!) 90 %   Pulse Oximetry Type Intermittent   Probe Placed On (Pulse Ox) Left:;finger   Flow (L/min) (Oxygen Therapy) 4   Oxygen Concentration (%) 36   Device (Oxygen Therapy) nasal cannula   BP (!) 132/49   MAP (mmHg) 77   BP Location Left arm   BP Method Automatic   Patient Position Sitting        Hemodialysis Catheter right subclavian   No placement date or time found.   Present Prior to Hospital Arrival?: Yes  Location: right subclavian   Dressing Status Clean;Dry;Intact   Dressing Intervention Integrity maintained   Venous Patency/Care deaccessed;flushed w/o difficulty;normal saline locked   Arterial Patency/Care deaccessed;flushed w/o difficulty;normal saline locked   During Hemodialysis Assessment   Intra-Hemodialysis Comments tx completed; blood rinsedback   Post-Hemodialysis Assessment   Rinseback Volume (mL) 250 mL   Blood Volume Processed (Liters) 67.2 L   Dialyzer Clearance Moderately streaked   Duration of Treatment 180 minutes   Total UF (mL) 2200 mL   Net Fluid Removal 1700   Patient Response to Treatment tolerated 3 hr Tx UF 1700 cc   Post-Hemodialysis Comments CVC flushed w 10 cc NS capped and wrapped. Report to primary RN. No distress noted upon departure

## 2025-04-04 NOTE — PLAN OF CARE
"Your fax has been successfully sent to [phone] at [phone].  ------------------------------------------------------------  4/4/2025 5:50:20 PM Transmission Record   Sent to [phone] with remote ID [phone]"   Result: (0/339;0/0) Success   Page record: 1 - 7   Elapsed time: 02:33 on channel 49      On call CM followed up on stretcher transportation. Scheduled ETA is 7:00 PM. Faxed updated SNF orders to [facility] nurse [phone]. Bedside nurse, Jasmin Reyes LPN, will send hard script with patient.      Lakeshia Simpson RN  Weekend  - [facility]  [phone]   "

## 2025-04-04 NOTE — SUBJECTIVE & OBJECTIVE
Interval History: Underwent dialysis yesterday night/thisAM. UPCR resulted at 9.     Review of patient's allergies indicates:  No Known Allergies  Current Facility-Administered Medications   Medication Frequency    0.9%  NaCl infusion (for blood administration) Q24H PRN    acetaminophen tablet 650 mg Q8H PRN    albuterol sulfate nebulizer solution 2.5 mg Q4H WAKE    amiodarone tablet 200 mg Daily    amLODIPine tablet 10 mg Daily    ARIPiprazole tablet 2 mg Daily    ascorbic acid (vitamin C) tablet 250 mg Daily    ciprofloxacin HCl tablet 750 mg Q24H    dextrose 50% injection 12.5 g PRN    dextrose 50% injection 25 g PRN    ferrous sulfate tablet 1 each Daily    glucagon (human recombinant) injection 1 mg PRN    glucose chewable tablet 16 g PRN    glucose chewable tablet 24 g PRN    hydrALAZINE tablet 50 mg Q8H    melatonin tablet 6 mg Nightly PRN    metoprolol succinate (TOPROL-XL) 24 hr tablet 25 mg Daily    naloxone 0.4 mg/mL injection 0.02 mg PRN    ondansetron disintegrating tablet 8 mg Q8H PRN    pantoprazole EC tablet 40 mg BID AC    PHENobarbitaL tablet 32.4 mg Daily    polyethylene glycol packet 17 g Daily    prochlorperazine injection Soln 5 mg Q6H PRN    senna tablet 8.6 mg Daily    sevelamer carbonate tablet 800 mg TID WM    torsemide tablet 50 mg BID loop    trazodone split tablet 25 mg Nightly PRN    vitamin D 1000 units tablet 2,000 Units Daily    zonisamide capsule 100 mg Daily       Objective:     Vital Signs (Most Recent):  Temp: 98.4 °F (36.9 °C) (04/04/25 1111)  Pulse: 71 (04/04/25 1221)  Resp: 18 (04/04/25 1221)  BP: (!) 139/55 (04/04/25 1111)  SpO2: (!) 94 % (04/04/25 1221) Vital Signs (24h Range):  Temp:  [97.6 °F (36.4 °C)-98.6 °F (37 °C)] 98.4 °F (36.9 °C)  Pulse:  [] 71  Resp:  [18-21] 18  SpO2:  [80 %-96 %] 94 %  BP: (105-148)/(39-60) 139/55     Weight: 45 kg (99 lb 3.3 oz) (04/02/25 0642)  Body mass index is 16.01 kg/m².  Body surface area is 1.45 meters squared.    I/O last 3  completed shifts:  In: -   Out: 3050 [Urine:850; Other:2200]     Physical Exam  Vitals and nursing note reviewed.   Constitutional:       General: He is not in acute distress.     Appearance: He is well-developed. He is cachectic. He is ill-appearing (chronically).   HENT:      Head: Normocephalic and atraumatic.   Eyes:      Extraocular Movements: Extraocular movements intact.   Cardiovascular:      Rate and Rhythm: Normal rate and regular rhythm.      Heart sounds: Normal heart sounds.   Pulmonary:      Effort: No respiratory distress.   Chest:      Comments: TDC in place to right chest wall  Abdominal:      General: There is no distension.   Musculoskeletal:         General: No tenderness. Normal range of motion.   Skin:     General: Skin is warm and dry.      Findings: No rash.   Neurological:      Mental Status: He is alert and oriented to person, place, and time.   Psychiatric:         Behavior: Behavior normal.         Thought Content: Thought content normal.         Judgment: Judgment normal.          Significant Labs:  All labs within the past 24 hours have been reviewed.     Significant Imaging:  Labs: Reviewed

## 2025-04-04 NOTE — PLAN OF CARE
Important Message from Medicare  Important Message from Medicare regarding Discharge Appeal Rights: Signed/date by patient/caregiver, Given to patient/caregiver, Explained to patient/caregiver     Date IMM was signed: 04/04/25  Time IMM was signed: 9760               AAYUSH Tidwell, LMSW  [facility]  Case Management  Ext. [phone]

## 2025-04-05 NOTE — PLAN OF CARE
[facility] Internal Medicine Telemetry  Discharge Final Note    Primary Care Provider: Deysi Pink MD    Expected Discharge Date: 4/4/2025    Patient discharged to [facility] SNF via stretcher transportation.     Patient's bedside nurse and MD notified of the above.    Discharge Plan A and Plan B have been determined by review of patient's clinical status, future medical and therapeutic needs, and coverage/benefits for post-acute care in coordination with multidisciplinary team members.        Final Discharge Note (most recent)       Final Note - 04/05/25 1147          Final Note    Assessment Type Final Discharge Note (P)      Anticipated Discharge Disposition Skilled Nursing Facility (P)      What phone number can be called within the next 1-3 days to see how you are doing after discharge? [phone] (P)      Hospital Resources/Appts/Education Provided Provided education on problems/symptoms using teachback (P)         Post-Acute Status    Post-Acute Authorization Placement (P)      Post-Acute Placement Status Set-up Complete/Auth obtained (P)      Coverage MEDICARE - MEDICARE PART A & B - (P)                      Important Message from Medicare  Important Message from Medicare regarding Discharge Appeal Rights: Signed/date by patient/caregiver, Given to patient/caregiver, Explained to patient/caregiver     Date IMM was signed: 04/04/25  Time IMM was signed: 1340    Contact Info       Alphonso Stephenson MD   Specialty: Nephrology    [address] [facility]  [place] [address]   Phone: [phone]       Next Steps: Follow up    [facility] [address]   Specialty: Gastroenterology    [address]  [facility] [address]   Phone: [phone]       Next Steps: Follow up    Deysi Pink MD   Specialty: Family Medicine, Internal Medicine   Relationship: PCP - General    [address]  [facility] [address]   Phone: [phone]       Next  Steps: Follow up                          AAYUSH Tidwell, LMSW  [facility]  Case Management  Ext. [phone]

## 2025-04-07 LAB
ALBUMIN, SPE (OHS): 2.37 G/DL (ref 3.35–5.55)
ALPHA 1 GLOB (OHS): 0.55 GM/DL (ref 0.17–0.41)
ALPHA 2 GLOB (OHS): 0.73 GM/DL (ref 0.43–0.99)
ANA (OHS): NORMAL
BETA GLOB (OHS): 0.98 GM/DL (ref 0.5–1.1)
GAMMA GLOBULIN (OHS): 0.97 GM/DL (ref 0.67–1.58)
KAPPA LC FREE SER-MCNC: 0.56 MG/L (ref 0.26–1.65)
KAPPA LC FREE/LAMBDA FREE SER: 11.77 MG/DL (ref 0.33–1.94)
LAMBDA LC FREE SERPL-MCNC: 21.07 MG/DL (ref 0.57–2.63)
PLA2R IGG SER IA-ACNC: <2 RU/ML
PLA2R IGG SERPL QL IF: NEGATIVE
PROT SERPL-MCNC: 5.6 GM/DL (ref 6–8.4)
THSD7A IGG SERPL QL IF: NEGATIVE

## 2025-04-07 NOTE — ASSESSMENT & PLAN NOTE
Pleural effusion  Hypervolemia   - 92% on 4L NC (baseline 1-2 L NC)  - CXR 03/31 with Persistent volume overload and pulmonary edema   - CT chest 04/02 with Findings of CHF and volume overload with cardiomegaly, moderate pericardial effusion, bilateral pleural effusions and superimposed pulmonary edema.   - given 40 mg IV lasix without improvement. Prior team suspected some dehydration component, given 500 mL fluid bolus without improvement  - 04/03; on chart review by me noted to have recently started HD every MWF. Had not been dialyzed since admission, nephrology consulted  - creatinine, respiratory status and volume status significantly improved with HD. Cleared to discharge and resume outpatient schedule.   Pleural effusion  Hypervolemia   - 92% on 4L NC (baseline 1-2 L NC)  - CXR 03/31 with Persistent volume overload and pulmonary edema   - CT chest 04/02 with Findings of CHF and volume overload with cardiomegaly, moderate pericardial effusion, bilateral pleural effusions and superimposed pulmonary edema.   - given 40 mg IV lasix without improvement. Prior team suspected some dehydration component, given 500 mL fluid bolus without improvement  - 04/03; on chart review by me noted to have recently started HD every MWF. Had not been dialyzed since admission, nephrology consulted  - wean oxygen as tolerated   Patient found to have small pleural effusion on imaging. I have personally reviewed and interpreted the following imaging: Xray. A thoracentesis was deferred. Most likely etiology includes Congestive Heart Failure. Management to include Diuresis    Patient with Hypoxic Respiratory failure which is Acute on chronic.  he is on home oxygen at 1 LPM. Supplemental oxygen was provided and noted-  s - CHF and Pleural effusion Labs and images were reviewed. Patient Has not had a recent ABG. Will treat underlying causes and adjust management of respiratory failure as follows    - lasix 40 mg IV qd to resume following  scope  - echo Mild global hypokinesis present. There is mildly reduced systolic function. Ejection fraction is approximately 45%. There is indeterminate diastolic function.

## 2025-04-07 NOTE — ASSESSMENT & PLAN NOTE
Anemia is likely due to unknown suspect GI. Most recent hemoglobin and hematocrit are listed below.  Recent Labs     04/04/25  0424   HGB 8.5*   HCT 26.9*     Plan  - Monitor serial CBC: Every 8 hours  - Transfuse PRBC if patient becomes hemodynamically unstable, symptomatic or H/H drops below 7/21.  - Patient has received 1 units of PRBCs on 3/27  - Patient's anemia is currently stable  - CLD, NPO at mn  - IV Protonix 40mg BID  - IVF hydration   - 2 large bore IV access  - anti-emetics as needed  - GI consulted, appreciate recs   - S/p EGD today     Impression:    - Mild Schatzki ring.                          - Normal stomach.                          - Normal examined duodenum.                          - No specimens collected.   Recommendation:        - [facility].                          - Resume previous diet.                          - Continue present medications.                          - Perform outpatient colonoscopy at appointment to                          be scheduled pending patient's clinical course and                          willingness to prep.

## 2025-04-07 NOTE — ASSESSMENT & PLAN NOTE
- PTOT   Post-Care Instructions: I reviewed with the patient in detail post-care instructions. Patient is to wear sunprotection, and avoid picking at any of the treated lesions. Pt may apply Vaseline to crusted or scabbing areas. Render Post-Care Instructions In Note?: no Consent: The patient's consent was obtained including but not limited to risks of crusting, scabbing, blistering, scarring, darker or lighter pigmentary change, recurrence, incomplete removal and infection. Duration Of Freeze Thaw-Cycle (Seconds): 3 Number Of Freeze-Thaw Cycles: 1 freeze-thaw cycle Detail Level: Detailed

## 2025-04-07 NOTE — DISCHARGE SUMMARY
Sander Brandon - Internal Medicine [facility] Medicine  Discharge Summary      Patient Name: Zelalem Gamez  MRN: [id number]  CINTHYA: [id number]  Patient Class: IP- Inpatient  Admission Date: 3/27/2025  Hospital Length of Stay: 7 days  Discharge Date and Time: 4/4/2025  7:54 PM  Attending Physician: No att. providers found   Discharging Provider: Destiny Machado PA-C  Primary Care Provider: Deysi Pink MD  [facility] Medicine Team: [facility] Destiny Machado PA-C  Primary Care Team: [facility]    HPI:   Mr. Zelalem Gamez arpit 74 year old male with a pMHx of FrEF (EF 35%), hypertension, peripheral arterial disease, chronic kidney disease on hemodialysis (MWF), seizure disorder who presentsafter routine labs revealed hemoglobin of 6.6. He is currently residing at a SNF after being discharged from the hospital on 03/07. Patient reports he has been having persistent constipation for several weeks. He has not noticed any blood in his stools, however, he reports that a nurse changes his briefs for him. He has been able to get out of bed with the help of PT but is mostly confined to his bed due to generalized weakness that has been ongoing since he was discharged from the hospital. He also endorses nasal congestion and cough with sputum production for several weeks. He is on 1-2L of O2 at baseline. Patient was admitted from 2/21-03/07 after a syncopal event. He was found to have several issues going on, including acute blood loss anemia requiring 2 units PRBCs. At this time, he also had an AGNEL on CKD along with rhabdomyolysis, so a tunneled catheter was placed and he was started on dialysis. He has been going to dialysis regularly. He denies headache, fever, chills, hemoptysis, decreased appetite, chest pain, abdominal pain, nausea, vomiting, dysuria, hematuria, or lower extremity pain/swelling.      In ED, Pt AFVSS on 1L NC. Hgb 6.6>6.8. No leukocytosis. K 3.4. Cr 3, previously 2.8. Trop 19. CXR:  Findings suggestive of pulmonary edema with bilateral pleural effusions, left greater than right. Admitted to  for c/f GIB.     Procedure(s) (LRB):  EGD (ESOPHAGOGASTRODUODENOSCOPY) (N/A)      Hospital Course:   Pt admitted for evaluation of symptomatic anemia from clinic w/ Hgb 6.6. Pt afebrile and HDS. Started on GIB pathway and transfused 1 unit PRBC. IV protonix. GI consulted and tentatively plan for scope 3/31. CTA c/f stercoral proctitis w/ large stool burden. Discussed w/ CRS, patient stable, will evacuate stool and continue to monitor. Cr at baseline. Also w/ b/l pleural effusions was diuresed following transfusion.       Previous hospital management team hospital course above. I (Destiny Machado PA-C with Dr. Ramon Bond) took over this patient's care on 04/03/2025. Per chart review admitted for acute anemia with hgb 6.6. given 1 unit pRBC with improvement. GI consulted, inpatient EGD performed with Schatzki's ring but otherwise normal stomach/esophagus. Recommended outpatient colonoscopy. Hemoglobin remained at baseline, however, patient was noted to have worsening oxygen requirement and increasing creatinine (3.0 >> 4.1) over 5-6 days. Given 40 mg IV lasix but creatinine continued to worsen, per documentation there was some concern for pre-renal azotemia and patient was given a small fluid bolus without improvement. Repeat chest CT 04/02 revealed Findings of CHF and volume overload with cardiomegaly, moderate pericardial effusion, bilateral pleural effusions and superimposed pulmonary edema. At the time of my assessment patient expressed to me that he was receiving HD at the [facility] every MWF but had not been dialyzed this admission. Nephrology consulted for volume management inpatient. Removed 1.7 L during dialysis, creatinine improved to 2.2 and patient able to be weaned down to 3L (baseline 1-2L). Discussed with nephrology, no plans for serial HD sessions - patient cleared to return to normal  outpatient schedule. Patient was very eager to return to his SNF to continue his therapy and was very agreeable to return to his normal HD schedule there.     Referral to outpatient GI sent to arrange further colonoscopy/further monitoring of anemia.     On day of discharge patient's vital signs were stable and patient appeared clinically ready for discharge. Hospital course, discharge plan and return precautions discussed - patient expressed understanding. All questions answered at that time.     Physical Exam  Vitals and nursing note reviewed.   Constitutional:       General: He is not in acute distress.     Appearance: He is well-developed. He is cachectic. He is ill-appearing (chronically).   HENT:      Head: Normocephalic and atraumatic.   Eyes:      Extraocular Movements: Extraocular movements intact.   Neck:      Vascular: JVD present.   Cardiovascular:      Rate and Rhythm: Normal rate and regular rhythm.      Heart sounds: Normal heart sounds.      Comments: Pitting edema to bilateral feet - improving   Pulmonary:      Effort: Pulmonary effort is normal. No respiratory distress. On 3L NC     Breath sounds: Rales present.      Comments: Shallow inspiratory effort, bibasilar rales - improved from prior  Chest:      Comments: TDC in place to right chest wall  Abdominal:      General: There is no distension.   Musculoskeletal:         General: No tenderness. Normal range of motion.      Right lower le+ Pitting Edema present.      Left lower le+ Pitting Edema present.   Skin:     General: Skin is warm and dry.      Findings: No rash.   Neurological:      Mental Status: He is alert and oriented to person, place, and time.   Psychiatric:         Behavior: Behavior normal.         Thought Content: Thought content normal.         Judgment: Judgment normal.         Goals of Care Treatment Preferences:  Code Status: Full Code      SDOH Screening:  The patient was screened for utility difficulties, food  insecurity, transport difficulties, housing insecurity, and interpersonal safety and there were no concerns identified this admission.     Consults:   Consults (From admission, onward)          Status Ordering Provider     Inpatient consult to Nephrology  Once        Provider:  (Not yet assigned)    Completed BELÉN PARNELL     Inpatient consult to Registered Dietitian/Nutritionist  Once        Provider:  (Not yet assigned)    Completed CAROLA FERRER     Inpatient consult to Gastroenterology  Once        Provider:  (Not yet assigned)    Completed CRUZITO SALTER            Assessment & Plan  Acute on chronic respiratory failure with hypoxemia  Pleural effusion  Pleural effusion  Hypervolemia   - 92% on 4L NC (baseline 1-2 L NC)  - CXR 03/31 with Persistent volume overload and pulmonary edema   - CT chest 04/02 with Findings of CHF and volume overload with cardiomegaly, moderate pericardial effusion, bilateral pleural effusions and superimposed pulmonary edema.   - given 40 mg IV lasix without improvement. Prior team suspected some dehydration component, given 500 mL fluid bolus without improvement  - 04/03; on chart review by me noted to have recently started HD every MWF. Had not been dialyzed since admission, nephrology consulted  - creatinine, respiratory status and volume status significantly improved with HD. Cleared to discharge and resume outpatient schedule.   Pleural effusion  Hypervolemia   - 92% on 4L NC (baseline 1-2 L NC)  - CXR 03/31 with Persistent volume overload and pulmonary edema   - CT chest 04/02 with Findings of CHF and volume overload with cardiomegaly, moderate pericardial effusion, bilateral pleural effusions and superimposed pulmonary edema.   - given 40 mg IV lasix without improvement. Prior team suspected some dehydration component, given 500 mL fluid bolus without improvement  - 04/03; on chart review by me noted to have recently started HD every MWF. Had not been dialyzed  since admission, nephrology consulted  - wean oxygen as tolerated   Patient found to have small pleural effusion on imaging. I have personally reviewed and interpreted the following imaging: Xray. A thoracentesis was deferred. Most likely etiology includes Congestive Heart Failure. Management to include Diuresis    Patient with Hypoxic Respiratory failure which is Acute on chronic.  he is on home oxygen at 1 LPM. Supplemental oxygen was provided and noted-  s - CHF and Pleural effusion Labs and images were reviewed. Patient Has not had a recent ABG. Will treat underlying causes and adjust management of respiratory failure as follows    - lasix 40 mg IV qd to resume following scope  - echo Mild global hypokinesis present. There is mildly reduced systolic function. Ejection fraction is approximately 45%. There is indeterminate diastolic function.   Seizure disorder  - continue phenobarbital and zonisamide  Generalized weakness  - PTOT  Symptomatic anemia  Anemia is likely due to  unknown suspect GI . Most recent hemoglobin and hematocrit are listed below.  Recent Labs     04/04/25  0424   HGB 8.5*   HCT 26.9*     Plan  - Monitor serial CBC: Every 8 hours  - Transfuse PRBC if patient becomes hemodynamically unstable, symptomatic or H/H drops below 7/21.  - Patient has received 1 units of PRBCs on 3/27  - Patient's anemia is currently stable  - CLD, NPO at mn  - IV Protonix 40mg BID  - IVF hydration   - 2 large bore IV access  - anti-emetics as needed  - GI consulted, appreciate recs   - S/p EGD today     Impression:    - Mild Schatzki ring.                          - Normal stomach.                          - Normal examined duodenum.                          - No specimens collected.   Recommendation:        - [facility] patient to [facility] for [facility] care.                          - Resume previous diet.                          - Continue present medications.                          - Perform outpatient  colonoscopy at appointment to                          be scheduled pending patient's clinical course and                          willingness to prep.                                                                          Atrial fibrillation  Patient has paroxysmal (<7 days) atrial fibrillation. Patient is currently in sinus rhythm. [id number] Score: 2. The patients heart rate in the last 24 hours is as follows:  No data recorded     Antiarrhythmics  , Daily, Oral  , Daily, Oral    Anticoagulants    Not initiated due to anemia     Plan  - Replete lytes with a goal of K>4, Mg >2  - Patient is not anticoagulated due to risk of bleeding  - Patient's afib is currently controlled    Stercoral colitis  Constipation  Constipation   - bowel regimen for stool evacuation and continue monitoring  - bowel regimen for stool evacuation and continue monitoring    Tobacco dependency  Dangers of cigarette smoking were reviewed with patient in detail. Patient was Counseled for 3-10 minutes. Nicotine replacement options were discussed. Nicotine replacement was discussed- not prescribed per patient's request  Severe protein-calorie malnutrition  Nutrition consulted. Most recent weight and BMI monitored-     Measurements:  Wt Readings from Last 1 Encounters:   04/02/25 45 kg (99 lb 3.3 oz)   Body mass index is 16.01 kg/m².    Patient has been screened and assessed by RD.    Malnutrition Type:  Context: chronic illness  Level: severe    Malnutrition Characteristic Summary:  Energy Intake (Malnutrition): less than 75% for greater than or equal to 1 month  Subcutaneous Fat (Malnutrition): moderate depletion  Muscle Mass (Malnutrition): severe depletion    Interventions/Recommendations (treatment strategy):  1. Recommend liberalization to regular diet 2. RD following    Cachexia  Body mass index (BMI) less than 16.5  Body mass index (BMI) less than 16.5   Concern for cachexia as evidenced by BMI less than 20 in the presence of known  chronic disease. Treatment to include nutrition consult.      Body mass index is 16.01 kg/m².  Albumin   Date Value Ref Range Status   03/27/2025 2.0 (L) 3.5 - 5.2 g/dL Final   03/07/2025 2.9 (L) 3.4 - 5.0 g/dL Final   10/08/2021 3.7 3.5 - 5.2 g/dL Final     Concern for cachexia as evidenced by BMI less than 20 in the presence of known chronic disease. Treatment to include nutrition consult.      Body mass index is 16.01 kg/m².  Albumin   Date Value Ref Range Status   03/27/2025 2.0 (L) 3.5 - 5.2 g/dL Final   03/07/2025 2.9 (L) 3.4 - 5.0 g/dL Final   10/08/2021 3.7 3.5 - 5.2 g/dL Final     Age-related physical debility  Patient with Chronic debility due to age-related physical debility. The patient's latest AMPAC (Activity Measure for Post Acute Care) Score is listed below.    AM-PAC Score - How much help does the patient need for each activity listed  Basic Mobility Total Score: 10  Turning over in bed (including adjusting bedclothes, sheets and blankets)?: A lot  Sitting down on and standing up from a chair with arms (e.g., wheelchair, bedside commode, etc.): A lot  Moving from lying on back to sitting on the side of the bed?: A lot  Moving to and from a bed to a chair (including a wheelchair)?: A lot  Need to walk in hospital room?: Unable  Climbing 3-5 steps with a railing?: Unable    Plan  - PT/OT consulted  - Fall precautions in place    Hypervolemia      ESRD (end stage renal disease) on dialysis  -HD MWF; last dialyzed prior to  admission  -Nephrology consulted for HD   -renally dose medications  -renal diet  -avoid nephrotoxins  Dialysis patient      Abnormal CT scan      Final Active Diagnoses:    Diagnosis Date Noted POA    PRINCIPAL PROBLEM:  Acute on chronic respiratory failure with hypoxemia [J96.21] 03/31/2025 Yes    ESRD (end stage renal disease) on dialysis [N18.6, Z99.2] 04/03/2025 Not Applicable    Abnormal CT scan [R93.89] 04/04/2025 Yes    Hypervolemia [E87.70] 04/03/2025 Yes    Dialysis  patient [Z99.2] 04/03/2025 Not Applicable    Cachexia [R64] 03/31/2025 Yes    Body mass index (BMI) less than 16.5 [Z68.1] 03/31/2025 Not Applicable    Age-related physical debility [R54] 03/31/2025 Yes    Tobacco dependency [F17.200] 03/29/2025 Yes    Constipation [K59.00] 03/28/2025 Yes    Stercoral colitis [K52.89] 03/28/2025 Yes    Generalized weakness [R53.1] 03/27/2025 Yes    Symptomatic anemia [D64.9] 03/27/2025 Yes    Atrial fibrillation [I48.91] 03/27/2025 Yes    Pleural effusion [J90] 03/27/2025 Yes    Severe protein-calorie malnutrition [E43] 10/14/2021 Yes    Seizure disorder [G40.909] 10/09/2021 Yes      Problems Resolved During this Admission:       Discharged Condition: stable    Disposition: Skilled Nursing Facility    Follow Up:   Follow-up Information       Alphonso Stephenson MD Follow up.    Specialty: Nephrology  Contact information:  [address]  [address]  [facility]  [facility] [address]  [phone]               [facility] 4th Fl Follow up.    Specialty: Gastroenterology  Contact information:  [address] [facility] [address] [phone]  Additional information:  [facility] - Main Building, 4th Floor   Please park in [facility] and take Atrium elevator             Deysi Pink MD Follow up.    Specialties: Family Medicine, Internal Medicine  Contact information:  [address] [facility] [address]  [phone]                           Patient Instructions:      Ambulatory Referral/Consult to Suspected Oncology Diagnosis Clinic   Standing Status: Future   Referral Priority: Routine Referral Type: Consultation   Referral Reason: Specialty Services Required   Requested Specialty: Hematology and Oncology   Number of Visits Requested: 1     Ambulatory referral/consult to Nephrology   Standing Status: Future   Referral Priority: Urgent Referral Type: Consultation   Referral Reason: Specialty Services  Required   Requested Specialty: Nephrology   Number of Visits Requested: 1     Ambulatory referral/consult to Gastroenterology   Standing Status: Future   Referral Priority: Urgent Referral Type: Consultation   Referral Reason: Specialty Services Required   Requested Specialty: Gastroenterology   Number of Visits Requested: 1     Diet renal     Notify your health care provider if you experience any of the following:  temperature >100.4     Notify your health care provider if you experience any of the following:  persistent nausea and vomiting or diarrhea     Notify your health care provider if you experience any of the following:  severe uncontrolled pain     Notify your health care provider if you experience any of the following:  difficulty breathing or increased cough     Notify your health care provider if you experience any of the following:  severe persistent headache     Notify your health care provider if you experience any of the following:  worsening rash     Notify your health care provider if you experience any of the following:  increased confusion or weakness     Activity as tolerated       Significant Diagnostic Studies:   Lab Results   Component Value Date    WBC 12.68 04/04/2025    HGB 8.5 (L) 04/04/2025    HCT 26.9 (L) 04/04/2025    MCV 98 04/04/2025     04/04/2025     CMP  Sodium   Date Value Ref Range Status   04/04/2025 135 (L) 136 - 145 mmol/L Final   03/07/2025 134 (L) 135 - 146 mmol/L Final   10/26/2021 141 136 - 145 mmol/L Final     Potassium   Date Value Ref Range Status   04/04/2025 3.7 3.5 - 5.1 mmol/L Final   03/07/2025 4.3 3.6 - 5.2 mmol/L Final   10/26/2021 4.1 3.5 - 5.1 mmol/L Final     Chloride   Date Value Ref Range Status   04/04/2025 103 95 - 110 mmol/L Final   10/26/2021 110 95 - 110 mmol/L Final     CO2   Date Value Ref Range Status   04/04/2025 23 23 - 29 mmol/L Final   10/26/2021 23 23 - 29 mmol/L Final     Carbon Dioxide   Date Value Ref Range Status   03/07/2025 24 24  - 32 mmol/L Final     Glucose   Date Value Ref Range Status   10/26/2021 89 70 - 110 mg/dL Final     BUN   Date Value Ref Range Status   04/04/2025 18 8 - 23 mg/dL Final     Creatinine   Date Value Ref Range Status   04/04/2025 2.2 (H) 0.5 - 1.4 mg/dL Final     Calcium   Date Value Ref Range Status   04/04/2025 8.1 (L) 8.7 - 10.5 mg/dL Final   03/07/2025 8.6 8.4 - 10.3 mg/dL Final   10/26/2021 9.4 8.7 - 10.5 mg/dL Final     Total Protein   Date Value Ref Range Status   10/08/2021 7.4 6.0 - 8.4 g/dL Final     Albumin   Date Value Ref Range Status   03/27/2025 2.0 (L) 3.5 - 5.2 g/dL Final   03/07/2025 2.9 (L) 3.4 - 5.0 g/dL Final   10/08/2021 3.7 3.5 - 5.2 g/dL Final     Total Bilirubin   Date Value Ref Range Status   03/07/2025 0.3 <1.3 mg/dL Final   10/08/2021 0.4 0.1 - 1.0 mg/dL Final     Comment:     For infants and newborns, interpretation of results should be based  on gestational age, weight and in agreement with clinical  observations.    Premature Infant recommended reference ranges:  Up to 24 hours.............<8.0 mg/dL  Up to 48 hours............<12.0 mg/dL  3-5 days..................<15.0 mg/dL  6-29 days.................<15.0 mg/dL       Bilirubin Total   Date Value Ref Range Status   03/27/2025 0.2 0.1 - 1.0 mg/dL Final     Comment:     For infants and newborns, interpretation of results should be based   on gestational age, weight and in agreement with clinical   observations.    Premature Infant recommended reference ranges:   0-24 hours:  <8.0 mg/dL   24-48 hours: <12.0 mg/dL   3-5 days:    <15.0 mg/dL   6-29 days:   <15.0 mg/dL     Alkaline Phosphatase   Date Value Ref Range Status   10/08/2021 41 (L) 55 - 135 U/L Final     ALP   Date Value Ref Range Status   03/27/2025 45 40 - 150 unit/L Final     AST   Date Value Ref Range Status   03/27/2025 21 11 - 45 unit/L Final   03/07/2025 37 <45 U/L Final   10/08/2021 20 10 - 40 U/L Final     ALT   Date Value Ref Range Status   03/27/2025 11 10 - 44 unit/L  Final   03/07/2025 25 <46 U/L Final   10/08/2021 7 (L) 10 - 44 U/L Final     Anion Gap   Date Value Ref Range Status   04/04/2025 9 8 - 16 mmol/L Final     eGFR   Date Value Ref Range Status   04/04/2025 30 (L) >60 mL/min/1.73/m2 Final     Comment:     Estimated GFR calculated using the CKD-EPI creatinine (2021) equation.     Imaging Results               CT Abdomen Pelvis  Without Contrast (Final result)  Result time 03/27/25 17:07:23      Final result by Az Villatoro MD (03/27/25 17:07:23)                   Impression:      This report was flagged in Epic as abnormal.    1. Large amount of stool within the rectum noting rectal wall thickening.  Stercoral proctitis or other infectious or inflammatory proctitis are considerations.  Correlation with any history of constipation or impaction.  2. Colonic diverticulosis without convincing findings to suggest diverticulitis.  3. Moderate bilateral pleural effusions with associated pericardial effusion suggest volume overload.  There is pulmonary edema.  4. Bilateral nonobstructive nephrolithiasis/renal vascular calcification.  5. Please see above for several additional findings.      Electronically signed by: Az Villatoro MD  Date:    03/27/2025  Time:    17:07               Narrative:    EXAMINATION:  CT ABDOMEN PELVIS WITHOUT CONTRAST    CLINICAL HISTORY:  GI bleed;Unable to get CTA due to ANGEL;    TECHNIQUE:  Low dose axial images, sagittal and coronal reformations were obtained from the lung bases to the pubic symphysis.  Oral contrast was not administered.    COMPARISON:  Radiograph 11/03/2010    FINDINGS:  Images of the lower thorax are remarkable for moderate bilateral pleural effusions with associated compressive atelectasis of the bilateral lower lobes.  Scattered interlobular septal thickening suggests superimposed edema.  The heart is not enlarged noting mild to moderate pericardial effusion.    Evaluation of the abdomen and pelvis is somewhat limited  secondary to motion artifact and artifact from patient's arms within the gantry.  Allowing for the above, the liver, spleen, pancreas and adrenal glands have a grossly unremarkable noncontrast appearance.  The gallbladder is surgically absent.  The common duct is prominent status post cholecystectomy.  The stomach is decompressed without wall thickening.  No significant abdominal lymphadenopathy.    There is bilateral nonobstructive nephrolithiasis/renal vascular calcification.  No hydronephrosis.  A cyst arises from the posterior aspect of the interpolar region of the left kidney measuring 1.3 cm.  An additional cyst arises from the lower pole of the right kidney measuring 2 cm.  The bilateral ureters are unable to be followed in their entirety to the urinary bladder, no definite calculi seen.  No secondary findings to suggest obstructive uropathy.  The urinary bladder is mildly distended, there may be residual wall thickening.  Evaluation of the lower pelvis is significantly limited secondary to extensive artifact from left hip arthroplasty.  The prostate does not appear to be significantly enlarged.    There is a large amount of stool in the rectum noting rectal wall thickening, findings may reflect stercoral proctitis.  Correlation with any history of constipation or impaction.  There are few scattered colonic diverticula without convincing inflammation to suggest diverticulitis.  The terminal ileum is grossly unremarkable.  The appendix is not confidently identified, no pericecal inflammation.  The small bowel is grossly unremarkable.  There are a few scattered shotty periaortic, pericaval, and mesenteric lymph nodes.  There is surgical change within the abdomen, may reflect prior small bowel resection.    There is osteopenia.  There are degenerative changes of the spine.  Left hip arthroplasty appears intact.  No significant inguinal lymphadenopathy.                                       X-Ray Chest AP  Portable (Final result)  Result time 03/27/25 16:04:47      Final result by Abdirashid Trinidad MD (03/27/25 16:04:47)                   Impression:      Findings suggestive of pulmonary edema with bilateral pleural effusions, left greater than right      Electronically signed by: Abdirashid Trinidad MD  Date:    03/27/2025  Time:    16:04               Narrative:    EXAMINATION:  XR CHEST AP PORTABLE    CLINICAL HISTORY:  Cough, unspecified    TECHNIQUE:  Single views of the chest were performed.    COMPARISON:  Chest radiograph dated October 8, 2021    FINDINGS:  Right IJ approach tunneled hemodialysis catheter is noted, tip projecting over the distal SVC.  The trachea and cardiomediastinal silhouette remains stable.  There is increased perihilar, interstitial and bibasilar opacities suggestive of pulmonary edema with bilateral pleural effusions (left greater than right).  No evidence for large pneumothorax.  Osseous structures demonstrate no evidence for acute fractures or dislocations.                                      Pending Diagnostic Studies:       Procedure Component Value Units Date/Time    SILVER [[id number]] Collected: 04/04/25 1131    Order Status: Sent Lab Status: In process Updated: 04/04/25 1148    Specimen: Blood     Immunoglobulin Free LT Chains Blood [[id number]] Collected: 04/04/25 1131    Order Status: Sent Lab Status: In process Updated: 04/04/25 1148    Specimen: Blood     Phospholipase A2 Receptor AB, Serum [[id number]] Collected: 04/04/25 1131    Order Status: Sent Lab Status: In process Updated: 04/04/25 1148    Specimen: Blood            Medications:  Reconciled Home Medications:      Medication List        PAUSE taking these medications      gabapentin 100 MG capsule  Wait to take this until your doctor or other care provider tells you to start again.  Commonly known as: NEURONTIN  Take 100 mg by mouth 2 (two) times daily.            START taking these medications      ciprofloxacin HCl 500 MG  tablet  Commonly known as: CIPRO  Take 1 tablet (500 mg total) by mouth once daily. for 3 days     torsemide 10 MG Tab  Commonly known as: DEMADEX  Take 5 tablets (50 mg total) by mouth 2 (two) times a day.            CHANGE how you take these medications      PHENobarbitaL 16.2 MG tablet  Take 2 tablets (32.4 mg total) by mouth once daily.  What changed: how much to take     tiZANidine 4 mg Cap  Take 1 capsule by mouth 2 (two) times daily as needed.  What changed:   when to take this  reasons to take this     zonisamide 100 MG Cap  Commonly known as: ZONEGRAN  Take 1 capsule (100 mg total) by mouth once daily.  What changed:   how much to take  when to take this            CONTINUE taking these medications      acetaminophen 500 MG tablet  Commonly known as: TYLENOL  Take 1 tablet (500 mg total) by mouth every 6 (six) hours as needed for Pain.     amiodarone 200 MG Tab  Commonly known as: PACERONE  Take 200 mg by mouth once daily.     amLODIPine 10 MG tablet  Commonly known as: NORVASC  Take 10 mg by mouth every morning.     ARIPiprazole 2 MG Tab  Commonly known as: ABILIFY  Take 2 mg by mouth once daily.     ascorbic acid (vitamin C) 250 MG tablet  Commonly known as: VITAMIN C  Take 1 tablet (250 mg total) by mouth once daily.     aspirin 81 MG Chew  Take 1 tablet by mouth once daily.     ferrous sulfate Tab tablet  Commonly known as: FEOSOL  Take 1 tablet (1 each total) by mouth once daily.     metoprolol succinate 25 MG 24 hr tablet  Commonly known as: TOPROL-XL  Take 25 mg by mouth once daily.     sevelamer carbonate 800 mg Tab  Commonly known as: RENVELA  Take 1 tablet (800 mg total) by mouth 3 (three) times daily with meals.     tamsulosin 0.4 mg Cap  Commonly known as: FLOMAX  Take 1 capsule by mouth every evening.     vitamin D 1000 units Tab  Commonly known as: VITAMIN D3  Take 2 tablets (2,000 Units total) by mouth once daily.            STOP taking these medications      zinc sulfate 50 mg zinc (220 mg)  capsule  Commonly known as: ZINCATE              Indwelling Lines/Drains at time of discharge:   Lines/Drains/Airways       Central Venous Catheter Line  Duration                  Hemodialysis Catheter right subclavian -- days                    Time spent on the discharge of patient: 36 minutes         Destiny Machado PA-C  Department of Hospital Medicine  [facility] Telemetry

## 2025-04-07 NOTE — ASSESSMENT & PLAN NOTE
Patient has paroxysmal (<7 days) atrial fibrillation. Patient is currently in sinus rhythm. [id number] Score: 2. The patients heart rate in the last 24 hours is as follows:  No data recorded     Antiarrhythmics  , Daily, Oral  , Daily, Oral    Anticoagulants    Not initiated due to anemia     Plan  - Replete lytes with a goal of K>4, Mg >2  - Patient is not anticoagulated due to risk of bleeding  - Patient's afib is currently controlled

## 2025-04-09 LAB
PATHOLOGIST INTERPRETATION - IFE SERUM (OHS): NORMAL
PATHOLOGIST REVIEW - SPE (OHS): NORMAL

## 2025-04-16 ENCOUNTER — TELEPHONE (OUTPATIENT)
Dept: GASTROENTEROLOGY | Facility: CLINIC | Age: 75
End: 2025-04-16
Payer: MEDICARE

## 2025-04-16 NOTE — TELEPHONE ENCOUNTER
Copied from CRM #[id number]. Topic: General Inquiry - Patient Advice  >> Apr 16, 2025 10:50 AM Denise wrote:  Pt is requesting a call from someone in the office and is asking for a return call back soon. Thanks.     Reason for call:[facility] hospital follow up      Patient's DX:    D64.9 (ICD-10-CM) - Symptomatic anemia      Patient requesting call back or [facility] msg: otilia [phone]

## 2025-05-01 ENCOUNTER — HOSPITAL ENCOUNTER (EMERGENCY)
Facility: HOSPITAL | Age: 75
Discharge: HOME | End: 2025-05-01
Attending: EMERGENCY MEDICINE
Payer: MEDICARE

## 2025-05-01 VITALS
SYSTOLIC BLOOD PRESSURE: 146 MMHG | BODY MASS INDEX: 16.07 KG/M2 | HEART RATE: 64 BPM | RESPIRATION RATE: 16 BRPM | HEIGHT: 66 IN | WEIGHT: 100 LBS | OXYGEN SATURATION: 99 % | DIASTOLIC BLOOD PRESSURE: 65 MMHG | TEMPERATURE: 99 F

## 2025-05-01 DIAGNOSIS — R60.9 EDEMA: ICD-10-CM

## 2025-05-01 DIAGNOSIS — M79.89 LEG SWELLING: Primary | ICD-10-CM

## 2025-05-01 LAB
ABSOLUTE EOSINOPHIL (OHS): 0.13 K/UL
ABSOLUTE MONOCYTE (OHS): 0.82 K/UL (ref 0.3–1)
ABSOLUTE NEUTROPHIL COUNT (OHS): 11.04 K/UL (ref 1.8–7.7)
ALBUMIN SERPL BCP-MCNC: 2 G/DL (ref 3.5–5.2)
ALP SERPL-CCNC: 38 UNIT/L (ref 40–150)
ALT SERPL W/O P-5'-P-CCNC: 6 UNIT/L (ref 10–44)
ANION GAP (OHS): 8 MMOL/L (ref 8–16)
AST SERPL-CCNC: 22 UNIT/L (ref 11–45)
BASOPHILS # BLD AUTO: 0.05 K/UL
BASOPHILS NFR BLD AUTO: 0.4 %
BILIRUB SERPL-MCNC: 0.2 MG/DL (ref 0.1–1)
BUN SERPL-MCNC: 40 MG/DL (ref 8–23)
CALCIUM SERPL-MCNC: 7.6 MG/DL (ref 8.7–10.5)
CHLORIDE SERPL-SCNC: 101 MMOL/L (ref 95–110)
CO2 SERPL-SCNC: 28 MMOL/L (ref 23–29)
CREAT SERPL-MCNC: 3.1 MG/DL (ref 0.5–1.4)
ERYTHROCYTE [DISTWIDTH] IN BLOOD BY AUTOMATED COUNT: 16.3 % (ref 11.5–14.5)
GFR SERPLBLD CREATININE-BSD FMLA CKD-EPI: 20 ML/MIN/1.73/M2
GLUCOSE SERPL-MCNC: 73 MG/DL (ref 70–110)
HCT VFR BLD AUTO: 29.8 % (ref 40–54)
HGB BLD-MCNC: 8.9 GM/DL (ref 14–18)
IMM GRANULOCYTES # BLD AUTO: 0.08 K/UL (ref 0–0.04)
IMM GRANULOCYTES NFR BLD AUTO: 0.6 % (ref 0–0.5)
LYMPHOCYTES # BLD AUTO: 0.98 K/UL (ref 1–4.8)
MCH RBC QN AUTO: 32.7 PG (ref 27–31)
MCHC RBC AUTO-ENTMCNC: 29.9 G/DL (ref 32–36)
MCV RBC AUTO: 110 FL (ref 82–98)
NUCLEATED RBC (/100WBC) (OHS): 0 /100 WBC
OHS QRS DURATION: 82 MS
OHS QTC CALCULATION: 446 MS
PLATELET # BLD AUTO: 222 K/UL (ref 150–450)
PMV BLD AUTO: 10.3 FL (ref 9.2–12.9)
POTASSIUM SERPL-SCNC: 4.9 MMOL/L (ref 3.5–5.1)
PROT SERPL-MCNC: 6 GM/DL (ref 6–8.4)
RBC # BLD AUTO: 2.72 M/UL (ref 4.6–6.2)
RELATIVE EOSINOPHIL (OHS): 1 %
RELATIVE LYMPHOCYTE (OHS): 7.5 % (ref 18–48)
RELATIVE MONOCYTE (OHS): 6.3 % (ref 4–15)
RELATIVE NEUTROPHIL (OHS): 84.2 % (ref 38–73)
SODIUM SERPL-SCNC: 137 MMOL/L (ref 136–145)
WBC # BLD AUTO: 13.1 K/UL (ref 3.9–12.7)

## 2025-05-01 PROCEDURE — 82040 ASSAY OF SERUM ALBUMIN: CPT | Performed by: EMERGENCY MEDICINE

## 2025-05-01 PROCEDURE — 93005 ELECTROCARDIOGRAM TRACING: CPT

## 2025-05-01 PROCEDURE — 93010 ELECTROCARDIOGRAM REPORT: CPT | Mod: ,,, | Performed by: INTERNAL MEDICINE

## 2025-05-01 PROCEDURE — 85025 COMPLETE CBC W/AUTO DIFF WBC: CPT | Performed by: EMERGENCY MEDICINE

## 2025-05-01 PROCEDURE — 99285 EMERGENCY DEPT VISIT HI MDM: CPT | Mod: 25

## 2025-05-01 NOTE — FIRST PROVIDER EVALUATION
"Medical screening examination initiated.  I have conducted a focused provider triage encounter, findings are as follows:    Brief history of present illness:  74 year male presents without complaint per EMS they were called to the patient's nursing facility for possible drug overdose.  However, on arrival they were told that the patient has not had dialysis for 6 or 7 days.    Vitals:    05/01/25 1438   BP: 119/65   Pulse: 66   Resp: 17   Temp: 98.8 °F (37.1 °C)   SpO2: 98%   Weight: 45.4 kg (100 lb)   Height: 5' 6" (1.676 m)       Pertinent physical exam:  Unna boot to left heel.  Tunneled catheter to right anterior chest wall    Brief workup plan:  cbc, cmp    Preliminary workup initiated; this workup will be continued and followed by the physician or advanced practice provider that is assigned to the patient when roomed.  "

## 2025-05-01 NOTE — ED PROVIDER NOTES
Encounter Date: 5/1/2025       History     Chief Complaint   Patient presents with    Edema     Pt arrived via EMS from [facility] reporting global body edema and that he has missed dialysis for the past week. Pt reports no complaints at this time and that he received partial dialysis treatment yesterday.      HPI patient is a 74-year-old male who was brought in by ambulance from [facility] with concern for lower extremity edema.  Per EMS report, the patient has missed dialysis for the previous week however the patient explains that he has been going to dialysis but is only tolerating 2 or 3 hours of dialysis at a time because of how uncomfortable the beds are.  The patient is very angry that he was transported to the emergency department in his not want to be here and denies any shortness of breath and notes that his swelling in his left lower extremity greater than his right it is very common for him.  Review of patient's allergies indicates:  No Known Allergies  Past Medical History:   Diagnosis Date    Arthritis     Epilepsy     Hyperglycemia     Stroke      Past Surgical History:   Procedure Laterality Date    CHOLECYSTECTOMY      ESOPHAGOGASTRODUODENOSCOPY N/A 3/31/2025    Procedure: EGD (ESOPHAGOGASTRODUODENOSCOPY);  Surgeon: Jose Meyer MD;  Location: [address]);  Service: Endoscopy;  Laterality: N/A;    JOINT REPLACEMENT      SKIN CANCER EXCISION       Family History   Problem Relation Name Age of Onset    Diabetes Mother       Social History[1]      Physical Exam     Initial Vitals [05/01/25 1438]   BP Pulse Resp Temp SpO2   119/65 66 17 98.8 °F (37.1 °C) 98 %      MAP       --         Physical Exam    Nursing note and vitals reviewed.  Constitutional: Patient appears well-developed and well-nourished. No distress.   HENT:   Head: Normocephalic and atraumatic.   Eyes: Conjunctivae and EOM are normal. Pupils are equal, round, and reactive to light.   Neck: Neck supple.    Normal range of motion.  Cardiovascular: Normal rate, regular rhythm, normal heart sounds and intact distal pulses.   Pulmonary/Chest: Breath sounds normal.   Abdominal: Abdomen is soft. Patient exhibits no distension. There is no abdominal tenderness.   Musculoskeletal:     3+ LLE edema without evidence of erythema  Neurological: Patient is alert and oriented to person, place, and time. No cranial nerve deficit. Gait normal. GCS score is 15.    Skin: Skin is warm and dry.  Psych: Normal mood/affect    ED Course   Procedures  Labs Reviewed   COMPREHENSIVE METABOLIC PANEL - Abnormal       Result Value    Sodium 137      Potassium 4.9      Chloride 101      CO2 28      Glucose 73      BUN 40 (*)     Creatinine 3.1 (*)     Calcium 7.6 (*)     Protein Total 6.0      Albumin 2.0 (*)     Bilirubin Total 0.2      ALP 38 (*)     AST 22      ALT 6 (*)     Anion Gap 8      eGFR 20 (*)    CBC WITH DIFFERENTIAL - Abnormal    WBC 13.10 (*)     RBC 2.72 (*)     HGB 8.9 (*)     HCT 29.8 (*)      (*)     MCH 32.7 (*)     MCHC 29.9 (*)     RDW 16.3 (*)     Platelet Count 222      MPV 10.3      Nucleated RBC 0      Neut % 84.2 (*)     Lymph % 7.5 (*)     Mono % 6.3      Eos % 1.0      Basophil % 0.4      Imm Grans % 0.6 (*)     Neut # 11.04 (*)     Lymph # 0.98 (*)     Mono # 0.82      Eos # 0.13      Baso # 0.05      Imm Grans # 0.08 (*)    CBC W/ AUTO DIFFERENTIAL    Narrative:     The following orders were created for panel order CBC Auto Differential.  Procedure                               Abnormality         Status                     ---------                               -----------         ------                     CBC with Differential[[id number]]       Abnormal            Final result                 Please view results for these tests on the individual orders.        ECG Results              EKG 12-lead (Final result)        Collection Time Result Time QRS Duration OHS QTC Calculation    05/01/25 14:47:36  05/01/25 16:14:38 82 446                     Final result by Interface, Lab In [facility] (05/01/25 16:14:46)                   Narrative:    Test Reason : R60.9,    Vent. Rate :  66 BPM     Atrial Rate :  66 BPM     P-R Int : 204 ms          QRS Dur :  82 ms      QT Int : 426 ms       P-R-T Axes :  77 107  37 degrees    QTcB Int : 446 ms    Normal sinus rhythm  Rightward axis  Nonspecific ST and T wave abnormality  Abnormal ECG  When compared with ECG of 03-Apr-2025 09:41,  The axis Shifted right  Confirmed by Sudheer Blount ([id number]) on 5/1/2025 4:14:33 PM    Referred By: AAAREFERRAL SELF           Confirmed By: Sudheer Blount                                  Imaging Results              US Lower Extremity Veins Left (Final result)  Result time 05/01/25 19:02:12      Final result by Ravi Elam MD (05/01/25 19:02:12)                   Impression:      No DVT seen.    Electronically signed by resident: Shania Kc  Date:    05/01/2025  Time:    18:48    Electronically signed by: Ravi Elam MD  Date:    05/01/2025  Time:    19:02               Narrative:    EXAMINATION:  US LOWER EXTREMITY VEINS LEFT    CLINICAL HISTORY:  Edema, unspecified    TECHNIQUE:  Duplex and color flow Doppler evaluation and graded compression of the left lower extremity veins was performed.    COMPARISON:  Report only from outside facility ultrasound lower extremity vein left 02/21/2025    FINDINGS:  Left thigh veins: The common femoral, femoral, popliteal, upper greater saphenous, and deep femoral veins are patent and free of thrombus. The veins are normally compressible and have normal phasic flow and augmentation response.    Left calf veins: The visualized calf veins are patent.    Contralateral CFV: The contralateral (right) common femoral vein is patent and free of thrombus.    Miscellaneous: None                        Preliminary result by Shania Kc MD (05/01/25 18:48:30)                   Impression:      No evidence of  deep venous thrombosis in the left lower extremity.    Electronically signed by resident: Shania Kc  Date:    05/01/2025  Time:    18:48                 Narrative:    EXAMINATION:  US LOWER EXTREMITY VEINS LEFT    CLINICAL HISTORY:  Edema, unspecified    TECHNIQUE:  Duplex and color flow Doppler evaluation and graded compression of the left lower extremity veins was performed.    COMPARISON:  Ultrasound lower extremity vein left 02/21/2025    FINDINGS:  Left thigh veins: The common femoral, femoral, popliteal, upper greater saphenous, and deep femoral veins are patent and free of thrombus. The veins are normally compressible and have normal phasic flow and augmentation response.    Left calf veins: The visualized calf veins are patent.    Contralateral CFV: The contralateral (right) common femoral vein is patent and free of thrombus.    Miscellaneous: None                                       X-Ray Chest AP Portable (Final result)  Result time 05/01/25 17:33:26      Final result by Ravi Elam MD (05/01/25 17:33:26)                   Impression:      As above.      Electronically signed by: Ravi Elam MD  Date:    05/01/2025  Time:    17:33               Narrative:    EXAMINATION:  XR CHEST AP PORTABLE    CLINICAL HISTORY:  lower extremity edema;    TECHNIQUE:  Single frontal view of the chest was performed.    COMPARISON:  Chest radiograph 03/31/2025, chest CT 04/02/2025    FINDINGS:  Patient is rotated.  Monitoring leads overlie the chest.    Right IJ CVC unchanged.  Cardiomediastinal silhouette is midline and within normal limits for age noting calcification at the arch.  Hilar contours are within normal limits.    Suspected small bilateral pleural effusions which appear decreased in volume with overall improved aeration of the lungs from most recent available prior imaging.  Bibasilar minimal platelike scarring versus atelectasis.  Minimal biapical pleuroparenchymal scarring.  Bilateral mild diffuse  nonspecific interstitial coarsening to a lesser degree from prior likely reflecting known mild chronic interstitial lung changes.  Superimposed pulmonary edema or interstitial type pneumonia not excluded.  No consolidation or pneumothorax.    No acute osseous process seen.  PA and lateral views can be obtained.                                       Medications - No data to display  Medical Decision Making  Amount and/or Complexity of Data Reviewed  Radiology: ordered.                     I have personally reviewed and interpreted the patients laboratory studies: CMP with K 4.9, Ca 7.6, CBC with WBC 13, h/h 8/29  I have personally reviewed and interpreted imaging studies: CXR: I have personally reviewed the CXR. No evidence of consolidation, cardiomegaly, pulmonary edema, pneumothroax  US without evidence of LLE DVT  I have personally reviewed and interpreted the patient's EKG:  Normal sinus rhythm at 66 beats per minute, QRS 82,  without ischemic ST-TW changes      I have also reviewed the following:   external records:  Left lower extremity ultrasound from 02/21/2025 without evidence of          Patient without evidence of volume overload, no pulmonary edema, potassium within normal limits, left lower extremity ultrasound completed without evidence of DVT.  Patient was transferred back to his care facility           Clinical Impression:  Final diagnoses:  [R60.9] Edema  [M79.89] Leg swelling (Primary)          ED Disposition Condition    Discharge Stable          ED Prescriptions    None       Follow-up Information       Follow up With Specialties Details Why Contact Info    [facility] Dept Emergency Medicine  As needed, If symptoms worsen [id number]  [facility] [address] [phone]                 [1]   Social History  Tobacco Use    Smoking status: Every Day     Current packs/day: 1.00     Average packs/day: 1 pack/day for 45.0 years (45.0 ttl pk-yrs)     Types: Cigarettes     Smokeless tobacco: Never   Substance Use Topics    Alcohol use: No    Drug use: No        Lucrecia Huerta MD  05/02/25 0920

## 2025-05-01 NOTE — ED TRIAGE NOTES
Zelalem Edward Gamez, a 74 y.o. male presents to the ED from [facility] w/ complaint of left leg swelling. Pt states this is a chronic issue.

## 2025-07-03 ENCOUNTER — TELEPHONE (OUTPATIENT)
Dept: PODIATRY | Facility: CLINIC | Age: 75
End: 2025-07-03
Payer: MEDICARE

## 2025-07-03 NOTE — TELEPHONE ENCOUNTER
Called patient to schedule appointment and establish care, no answer, voice mailbox not set up yet.  Copied from CRM #[id number]. Topic: Appointments - Appointment Access  >> Jul 3, 2025  9:42 AM Viky wrote:  Type : Appointment access         Who Called :Sergio with [facility]         Does the patient know what this is regarding?:Sergio with [facility] called and stated that patient has a wound on the right  foot and would like to receive a call back in regard to getting an appointment and establishing  care for treatment. Please follow up as soon as possible. Thanks!!        Would the patient rather a call back or a response via [facility]?call back         Best Call Back Number:Sergio with [facility] [phone]  >> Jul 3, 2025  9:49 AM Nurse Malhotra wrote:  Good morning,This referral needs to go to podiatry.ThanksHaskell [facility] Wound Care DeptNcal Malhotra  ----- Message -----  From: Viky Mcnamara  Sent: 7/3/2025   9:43 AM CDT  To: [facility] Wound Clinical Support; [facility]*     Dr. Chavarria wanted to know when patient has appointment with Endo. They are in the office now waiting to see the doctor. If she wants them to ask any specific questions, please call her back. Appointment was for 9:20, but they are still waiting for doctor.

## 2025-08-07 ENCOUNTER — PATIENT MESSAGE (OUTPATIENT)
Dept: PODIATRY | Facility: CLINIC | Age: 75
End: 2025-08-07
Payer: MEDICARE

## 2025-08-07 ENCOUNTER — TELEPHONE (OUTPATIENT)
Dept: PODIATRY | Facility: CLINIC | Age: 75
End: 2025-08-07
Payer: MEDICARE

## 2025-08-07 NOTE — TELEPHONE ENCOUNTER
Called patient to reschedule 8/7 appointment due to provider out of clinic. Left Voicemail with clinic call back number of [phone]. Portal message was sent and appointment was cancelled.

## 2025-08-13 ENCOUNTER — OFFICE VISIT (OUTPATIENT)
Dept: PODIATRY | Facility: CLINIC | Age: 75
End: 2025-08-13
Payer: MEDICARE

## 2025-08-13 VITALS
DIASTOLIC BLOOD PRESSURE: 63 MMHG | WEIGHT: 74 LBS | HEART RATE: 71 BPM | RESPIRATION RATE: 17 BRPM | BODY MASS INDEX: 11.94 KG/M2 | SYSTOLIC BLOOD PRESSURE: 125 MMHG | TEMPERATURE: 98 F

## 2025-08-13 DIAGNOSIS — L97.512 ULCER OF RIGHT FOOT WITH FAT LAYER EXPOSED: ICD-10-CM

## 2025-08-13 DIAGNOSIS — Z99.2 ESRD (END STAGE RENAL DISEASE) ON DIALYSIS: ICD-10-CM

## 2025-08-13 DIAGNOSIS — L97.422 SKIN ULCER OF LEFT HEEL WITH FAT LAYER EXPOSED: Primary | ICD-10-CM

## 2025-08-13 DIAGNOSIS — N18.6 ESRD (END STAGE RENAL DISEASE) ON DIALYSIS: ICD-10-CM

## 2025-08-13 DIAGNOSIS — M24.569 CONTRACTURE OF JOINT, LOWER LEG: ICD-10-CM

## 2025-08-13 DIAGNOSIS — R54 AGE-RELATED PHYSICAL DEBILITY: ICD-10-CM

## 2025-08-13 PROCEDURE — 87076 CULTURE ANAEROBE IDENT EACH: CPT | Performed by: PODIATRIST

## 2025-08-13 PROCEDURE — 87186 SC STD MICRODIL/AGAR DIL: CPT | Performed by: PODIATRIST

## 2025-08-13 PROCEDURE — 99999 PR PBB SHADOW E&M-EST. PATIENT-LVL IV: CPT | Mod: PBBFAC,,, | Performed by: PODIATRIST

## 2025-08-13 PROCEDURE — 99214 OFFICE O/P EST MOD 30 MIN: CPT | Mod: PBBFAC | Performed by: PODIATRIST

## 2025-08-13 PROCEDURE — 99204 OFFICE O/P NEW MOD 45 MIN: CPT | Mod: S$PBB,,, | Performed by: PODIATRIST

## 2025-08-16 LAB
BACTERIA SPEC AEROBE CULT: ABNORMAL
BACTERIA SPEC AEROBE CULT: ABNORMAL

## 2025-08-19 LAB
BACTERIA SPEC ANAEROBE CULT: ABNORMAL